# Patient Record
Sex: FEMALE | Race: WHITE | NOT HISPANIC OR LATINO | Employment: FULL TIME | URBAN - METROPOLITAN AREA
[De-identification: names, ages, dates, MRNs, and addresses within clinical notes are randomized per-mention and may not be internally consistent; named-entity substitution may affect disease eponyms.]

---

## 2017-02-06 ENCOUNTER — OFFICE VISIT (OUTPATIENT)
Dept: INTERNAL MEDICINE | Facility: CLINIC | Age: 47
End: 2017-02-06
Payer: COMMERCIAL

## 2017-02-06 ENCOUNTER — LAB VISIT (OUTPATIENT)
Dept: LAB | Facility: HOSPITAL | Age: 47
End: 2017-02-06
Attending: INTERNAL MEDICINE
Payer: COMMERCIAL

## 2017-02-06 ENCOUNTER — IMMUNIZATION (OUTPATIENT)
Dept: INTERNAL MEDICINE | Facility: CLINIC | Age: 47
End: 2017-02-06
Payer: COMMERCIAL

## 2017-02-06 VITALS — HEIGHT: 68 IN | SYSTOLIC BLOOD PRESSURE: 108 MMHG | HEART RATE: 70 BPM | DIASTOLIC BLOOD PRESSURE: 76 MMHG

## 2017-02-06 DIAGNOSIS — Z00.00 ENCOUNTER FOR PREVENTIVE HEALTH EXAMINATION: ICD-10-CM

## 2017-02-06 DIAGNOSIS — Z00.00 ENCOUNTER FOR PREVENTIVE HEALTH EXAMINATION: Primary | ICD-10-CM

## 2017-02-06 LAB
ALBUMIN SERPL BCP-MCNC: 3.9 G/DL
ALP SERPL-CCNC: 59 U/L
ALT SERPL W/O P-5'-P-CCNC: 35 U/L
ANION GAP SERPL CALC-SCNC: 5 MMOL/L
AST SERPL-CCNC: 29 U/L
BASOPHILS # BLD AUTO: 0.03 K/UL
BASOPHILS NFR BLD: 0.5 %
BILIRUB SERPL-MCNC: 0.3 MG/DL
BUN SERPL-MCNC: 12 MG/DL
CALCIUM SERPL-MCNC: 9 MG/DL
CHLORIDE SERPL-SCNC: 105 MMOL/L
CHOLEST/HDLC SERPL: 3.1 {RATIO}
CO2 SERPL-SCNC: 29 MMOL/L
CREAT SERPL-MCNC: 0.7 MG/DL
DIFFERENTIAL METHOD: NORMAL
EOSINOPHIL # BLD AUTO: 0.3 K/UL
EOSINOPHIL NFR BLD: 4.2 %
ERYTHROCYTE [DISTWIDTH] IN BLOOD BY AUTOMATED COUNT: 12.3 %
EST. GFR  (AFRICAN AMERICAN): >60 ML/MIN/1.73 M^2
EST. GFR  (NON AFRICAN AMERICAN): >60 ML/MIN/1.73 M^2
GLUCOSE SERPL-MCNC: 92 MG/DL
GLUCOSE SERPL-MCNC: 94 MG/DL (ref 70–110)
HCT VFR BLD AUTO: 38.4 %
HDL/CHOLESTEROL RATIO: 32.6 %
HDLC SERPL-MCNC: 138 MG/DL
HDLC SERPL-MCNC: 45 MG/DL
HGB BLD-MCNC: 12.9 G/DL
LDLC SERPL CALC-MCNC: 84 MG/DL
LYMPHOCYTES # BLD AUTO: 1.5 K/UL
LYMPHOCYTES NFR BLD: 23.9 %
MCH RBC QN AUTO: 30.9 PG
MCHC RBC AUTO-ENTMCNC: 33.6 %
MCV RBC AUTO: 92 FL
MONOCYTES # BLD AUTO: 0.4 K/UL
MONOCYTES NFR BLD: 6.6 %
NEUTROPHILS # BLD AUTO: 4 K/UL
NEUTROPHILS NFR BLD: 64.6 %
NONHDLC SERPL-MCNC: 93 MG/DL
PLATELET # BLD AUTO: 237 K/UL
PMV BLD AUTO: 9.9 FL
POTASSIUM SERPL-SCNC: 4.3 MMOL/L
PROT SERPL-MCNC: 7.1 G/DL
RBC # BLD AUTO: 4.18 M/UL
SODIUM SERPL-SCNC: 139 MMOL/L
TRIGL SERPL-MCNC: 45 MG/DL
TSH SERPL DL<=0.005 MIU/L-ACNC: 0.58 UIU/ML
WBC # BLD AUTO: 6.19 K/UL

## 2017-02-06 PROCEDURE — 80061 LIPID PANEL: CPT

## 2017-02-06 PROCEDURE — 85025 COMPLETE CBC W/AUTO DIFF WBC: CPT

## 2017-02-06 PROCEDURE — 84443 ASSAY THYROID STIM HORMONE: CPT

## 2017-02-06 PROCEDURE — 99396 PREV VISIT EST AGE 40-64: CPT | Mod: S$GLB,,, | Performed by: PHYSICIAN ASSISTANT

## 2017-02-06 PROCEDURE — 90688 IIV4 VACCINE SPLT 0.5 ML IM: CPT | Mod: S$GLB,,, | Performed by: INTERNAL MEDICINE

## 2017-02-06 PROCEDURE — 90471 IMMUNIZATION ADMIN: CPT | Mod: S$GLB,,, | Performed by: INTERNAL MEDICINE

## 2017-02-06 PROCEDURE — 80053 COMPREHEN METABOLIC PANEL: CPT

## 2017-02-06 PROCEDURE — 99999 PR PBB SHADOW E&M-EST. PATIENT-LVL III: CPT | Mod: PBBFAC,,, | Performed by: PHYSICIAN ASSISTANT

## 2017-02-06 PROCEDURE — 36415 COLL VENOUS BLD VENIPUNCTURE: CPT

## 2017-02-06 NOTE — PROGRESS NOTES
Subjective:       Patient ID: Heather Scheuermann Stark is a 46 y.o. female.        Chief Complaint: Annual Exam (labs today)    HPI Comments: Heather Scheuermann Stark is an established patient of Eloy Ortega MD here today for annual exam.    Need sooner physical than she was able to schedule with PCP due to form needed to present to her employer for biometric screening.      Feeling well with no complaints.      HEALTH MAINTENANCE  [facility]  Pap and mammogram per GYN, up-to-date  Flu shot today  Lab work today (of note not fasting but still wants to do labs today)         Review of Systems   Constitutional: Negative for chills, diaphoresis, fatigue and fever.   HENT: Negative for congestion and sore throat.    Eyes: Negative for visual disturbance.   Respiratory: Negative for cough, chest tightness and shortness of breath.    Cardiovascular: Negative for chest pain, palpitations and leg swelling.   Gastrointestinal: Negative for abdominal pain, blood in stool, constipation, diarrhea, nausea and vomiting.   Genitourinary: Negative for dysuria, frequency, hematuria and urgency.   Musculoskeletal: Negative for arthralgias and back pain.   Skin: Negative for rash.   Neurological: Negative for dizziness, syncope, weakness and headaches.   Psychiatric/Behavioral: Negative for dysphoric mood and sleep disturbance. The patient is not nervous/anxious.        Objective:      Physical Exam   Constitutional: She appears well-developed and well-nourished.   HENT:   Head: Normocephalic.   Right Ear: External ear normal.   Left Ear: External ear normal.   Mouth/Throat: Oropharynx is clear and moist.   Eyes: Pupils are equal, round, and reactive to light.   Cardiovascular: Normal rate, regular rhythm and normal heart sounds.  Exam reveals no gallop and no friction rub.    No murmur heard.  Pulmonary/Chest: Effort normal and breath sounds normal. No respiratory distress.   Abdominal: Soft. Normal appearance. There  "is no tenderness.   Musculoskeletal: She exhibits no edema.   Neurological: She is alert.   Skin: Skin is warm and dry.   Psychiatric: She has a normal mood and affect.   Nursing note and vitals reviewed.      Assessment:       1. Encounter for preventive health examination        Plan:       Sana was seen today for annual exam.    Diagnoses and all orders for this visit:    Encounter for preventive health examination  -     CBC auto differential; Future  -     Comprehensive metabolic panel; Future  -     Lipid panel; Future  -     TSH; Future  -     POCT glucose    Check lab work as above.  Form completed and returned to patient.    Pt has been given instructions populated from [employer] database and has verbalized understanding of the after visit summary and information contained wherein.    Follow up with a primary care provider. May go to ER for acute shortness of breath, lightheadedness, fever, or any other emergent complaints or changes in condition.    "This note will be shared with the patient"    No future appointments.            "

## 2017-02-06 NOTE — MR AVS SNAPSHOT
"    [facility]  [address]  [facility] [address]  Phone: [phone]  Fax: [phone]                  Heather Scheuermann Stark   2017 11:30 AM   Office Visit    Description:  Female : 1970   Provider:  Vicky Olmedo PA-C   Department:  [facility]           Reason for Visit     Annual Exam           Diagnoses this Visit        Comments    Encounter for preventive health examination    -  Primary            To Do List           Future Appointments        Provider Department Dept Phone    2017 12:00 PM LAB, APPOINTMENT [facility] [phone]      Goals (5 Years of Data)     None      [facility]     [facility] Care [facility] -  Assistance  Registered nurses in the [facility] provide clinical advisement, health education, appointment booking, and other advisory services.  Call for this free service at [phone].             Medications           Message regarding Medications     Verify the changes and/or additions to your medication regime listed below are the same as discussed with your clinician today.  If any of these changes or additions are incorrect, please notify your healthcare provider.             Verify that the below list of medications is an accurate representation of the medications you are currently taking.  If none reported, the list may be blank. If incorrect, please contact your healthcare provider. Carry this list with you in case of emergency.           Current Medications     escitalopram oxalate (LEXAPRO) 10 MG tablet TAKE 1 TABLET BY MOUTH DAILY    ibuprofen (ADVIL,MOTRIN) 100 MG tablet Take 100 mg by mouth every 6 (six) hours as needed for Temperature greater than.           Clinical Reference Information           Your Vitals Were     BP Pulse Height Last Period          108/76 (BP Location: Left arm, Patient Position: Sitting, BP Method: Manual) 70 5' 8" " (1.727 m) 01/05/2017        Blood Pressure          Most Recent Value    BP  108/76      Allergies as of 2/6/2017     Adhesive      Immunizations Administered on Date of Encounter - 2/6/2017     None      Orders Placed During Today's Visit      Normal Orders This Visit    POCT glucose     Future Labs/Procedures Expected by Expires    CBC auto differential  2/6/2017 2/6/2018    Comprehensive metabolic panel  2/6/2017 2/6/2018    Lipid panel  2/6/2017 4/7/2018    TSH  2/6/2017 4/7/2018 2/6/2017 11:33 AM - Regina Ortiz MA      Component Results     Component Value Flag Ref Range Units Status    POC Glucose 94  70 - 110 mg/dL Final            Instructions      Prevention Guidelines, Women Ages 40 to 49  Screening tests and vaccines are an important part of managing your health. Health counseling is essential, too. Below are guidelines for these, for women ages 40 to 49. Talk with your healthcare provider to make sure youre up-to-date on what you need.  Screening Who needs it How often   Type 2 diabetes or prediabetes All adults beginning at age 45 and adults without symptoms at any age who are overweight or obese and have 1 or more additional risk factors for diabetes At least every 3 years   Alcohol misuse All women in this age group At routine exams   Blood pressure All women in this age group Every 2 years if your blood pressure is less than 120/80 mm Hg; yearly if your systolic blood pressure is 120 to 139 mm Hg, or your diastolic blood pressure reading is 80 to 89 mm Hg   Breast cancer All women in this age group Yearly mammogram and clinical breast exam2  Each woman should make her own decision. If a woman decides to have mammograms before age 50, they should be done every 2 years.3   Cervical cancer All women in this age group, except women who have had a complete hysterectomy Pap test every 3 years or Pap test plus human papilloma virus (HPV) test every 5 years   Chlamydia Women at increased risk for  infection At routine exams if you're at risk or have symptoms   Depression All women in this age group At routine exams   Gonorrhea Sexually active women at increased risk for infection At routine exams   Hepatitis C Anyone at increased risk; 1 time for those born between 1945 and 1965 At routine exams   High cholesterol or triglycerides All women ages 45 and older who are at risk for coronary artery disease; younger women, talk with your healthcare provider At least every 5 years   HIV All women At routine exams   Obesity All women in this age group At routine exams   Syphilis Women at increased risk for infection - talk with your healthcare provider At routine exams   Tuberculosis Women at increased risk for infection - talk with your healthcare provider Ask your healthcare provider   Vision All women in this age group Complete exam at age 40 and eye exams every 2 to 4 years. If you have a chronic disease, ask your healthcare provider how often you should have your eyes examined.4   Vaccine Who needs it How often   Chickenpox (varicella) All women in this age group who have no record of this infection or vaccine 2 doses; the second dose should be given at least 4 weeks after the first dose   Hepatitis A Women at increased risk for infection - talk with your healthcare provider 2 doses given 6 months apart   Hepatitis B Women at increased risk for infection - talk with your healthcare provider 3 doses over 6 months; second dose should be given 1 month after the first dose; the third dose should be given at least 2 months after the second dose and at least 4 months after the first dose   Haemophilus influenzae Type B (HIB) Women at increased risk 1 to 3 doses   Influenza (flu) All women in this age group Once a year   Measles, mumps, rubella (MMR) All women in this age group who have no record of these infections or vaccines 1 or 2 doses   Meningococcal Women at increased risk for infection - talk with your  healthcare provider 1 or more doses   Pneumococcal conjugate vaccine (PCV13) and pneumococcal polysaccharide vaccine (PPSV23) Women at increased risk for infection - talk with your healthcare provider 1 or 2 doses   Tetanus/diphtheria/pertussis (Td/Tdap) booster All women in this age group A one-time dose of Tdap instead of a Td booster after age 18, then Td every 10 years   Counseling Who needs it How often   BRCA gene mutation testing for breast and ovarian cancer susceptibility Women with increased risk for having gene mutation When your risk is known   Breast cancer and chemoprevention Women at high risk for breast cancer When your risk is known   Diet and exercise Women who are overweight or obese When diagnosed, and then at routine exams   Domestic violence Women at the age in which they are able to have children At routine exams   Sexually transmitted infection prevention Women at increased risk for infection - talk with your healthcare provider At routine exams   Use of tobacco and the health effects it can cause All women in this age group Every exam   1[facility] Diabetes Association  2American Cancer Society  3U.S. Preventive Services Task Force  4[facility] Academy of Ophthalmology  Date Last Reviewed: 8/27/2015 © 2000-2016 [employer]. [address] [place] [address]. All rights reserved. This information is not intended as a substitute for professional medical care. Always follow your healthcare professional's instructions.             Language Assistance Services     ATTENTION: Language assistance services are available, free of charge. Please call [phone].      ATENCIÓN: Si habla español, tiene a bain disposición servicios gratuitos de asistencia lingüística. Llame al [phone].     CHÚ Ý: N?u b?n nói Ti?ng Vi?t, có các d?ch v? h? tr? ngôn ng? mi?n phí dành cho b?n. G?i s? [phone].         [facility] complies with applicable Federal civil rights  laws and does not discriminate on the basis of race, color, national origin, age, disability, or sex.

## 2017-02-06 NOTE — PATIENT INSTRUCTIONS
Prevention Guidelines, Women Ages 40 to 49  Screening tests and vaccines are an important part of managing your health. Health counseling is essential, too. Below are guidelines for these, for women ages 40 to 49. Talk with your healthcare provider to make sure youre up-to-date on what you need.  Screening Who needs it How often   Type 2 diabetes or prediabetes All adults beginning at age 45 and adults without symptoms at any age who are overweight or obese and have 1 or more additional risk factors for diabetes At least every 3 years   Alcohol misuse All women in this age group At routine exams   Blood pressure All women in this age group Every 2 years if your blood pressure is less than 120/80 mm Hg; yearly if your systolic blood pressure is 120 to 139 mm Hg, or your diastolic blood pressure reading is 80 to 89 mm Hg   Breast cancer All women in this age group Yearly mammogram and clinical breast exam2  Each woman should make her own decision. If a woman decides to have mammograms before age 50, they should be done every 2 years.3   Cervical cancer All women in this age group, except women who have had a complete hysterectomy Pap test every 3 years or Pap test plus human papilloma virus (HPV) test every 5 years   Chlamydia Women at increased risk for infection At routine exams if you're at risk or have symptoms   Depression All women in this age group At routine exams   Gonorrhea Sexually active women at increased risk for infection At routine exams   Hepatitis C Anyone at increased risk; 1 time for those born between 1945 and 1965 At routine exams   High cholesterol or triglycerides All women ages 45 and older who are at risk for coronary artery disease; younger women, talk with your healthcare provider At least every 5 years   HIV All women At routine exams   Obesity All women in this age group At routine exams   Syphilis Women at increased risk for infection - talk with your healthcare provider At routine  exams   Tuberculosis Women at increased risk for infection - talk with your healthcare provider Ask your healthcare provider   Vision All women in this age group Complete exam at age 40 and eye exams every 2 to 4 years. If you have a chronic disease, ask your healthcare provider how often you should have your eyes examined.4   Vaccine Who needs it How often   Chickenpox (varicella) All women in this age group who have no record of this infection or vaccine 2 doses; the second dose should be given at least 4 weeks after the first dose   Hepatitis A Women at increased risk for infection - talk with your healthcare provider 2 doses given 6 months apart   Hepatitis B Women at increased risk for infection - talk with your healthcare provider 3 doses over 6 months; second dose should be given 1 month after the first dose; the third dose should be given at least 2 months after the second dose and at least 4 months after the first dose   Haemophilus influenzae Type B (HIB) Women at increased risk 1 to 3 doses   Influenza (flu) All women in this age group Once a year   Measles, mumps, rubella (MMR) All women in this age group who have no record of these infections or vaccines 1 or 2 doses   Meningococcal Women at increased risk for infection - talk with your healthcare provider 1 or more doses   Pneumococcal conjugate vaccine (PCV13) and pneumococcal polysaccharide vaccine (PPSV23) Women at increased risk for infection - talk with your healthcare provider 1 or 2 doses   Tetanus/diphtheria/pertussis (Td/Tdap) booster All women in this age group A one-time dose of Tdap instead of a Td booster after age 18, then Td every 10 years   Counseling Who needs it How often   BRCA gene mutation testing for breast and ovarian cancer susceptibility Women with increased risk for having gene mutation When your risk is known   Breast cancer and chemoprevention Women at high risk for breast cancer When your risk is known   Diet and exercise  Women who are overweight or obese When diagnosed, and then at routine exams   Domestic violence Women at the age in which they are able to have children At routine exams   Sexually transmitted infection prevention Women at increased risk for infection - talk with your healthcare provider At routine exams   Use of tobacco and the health effects it can cause All women in this age group Every exam   1[facility] Diabetes Association  2American Cancer Society  3U.S. Preventive Services Task Force  4A[facility] Academy of Ophthalmology  Date Last Reviewed: 8/27/2015  © 7305-7242 [employer]. [address] [place] [address], [place] [id number]. All rights reserved. This information is not intended as a substitute for professional medical care. Always follow your healthcare professional's instructions.

## 2017-04-28 ENCOUNTER — OFFICE VISIT (OUTPATIENT)
Dept: OBSTETRICS AND GYNECOLOGY | Facility: CLINIC | Age: 47
End: 2017-04-28
Payer: COMMERCIAL

## 2017-04-28 VITALS — WEIGHT: 163.13 LBS | HEIGHT: 64 IN | BODY MASS INDEX: 27.85 KG/M2

## 2017-04-28 DIAGNOSIS — Z01.419 ENCOUNTER FOR GYNECOLOGICAL EXAMINATION WITHOUT ABNORMAL FINDING: Primary | ICD-10-CM

## 2017-04-28 DIAGNOSIS — Z12.31 VISIT FOR SCREENING MAMMOGRAM: ICD-10-CM

## 2017-04-28 PROCEDURE — 99999 PR PBB SHADOW E&M-EST. PATIENT-LVL II: CPT | Mod: PBBFAC,,, | Performed by: OBSTETRICS & GYNECOLOGY

## 2017-04-28 PROCEDURE — 99396 PREV VISIT EST AGE 40-64: CPT | Mod: S$GLB,,, | Performed by: OBSTETRICS & GYNECOLOGY

## 2017-04-28 RX ORDER — CLOBETASOL PROPIONATE 0.5 MG/G
CREAM TOPICAL 2 TIMES DAILY
Qty: 45 G | Refills: 12 | Status: SHIPPED | OUTPATIENT
Start: 2017-04-28 | End: 2019-02-01

## 2017-04-28 RX ORDER — FLUCONAZOLE 150 MG/1
150 TABLET ORAL ONCE
Qty: 1 TABLET | Refills: 66 | Status: SHIPPED | OUTPATIENT
Start: 2017-04-28 | End: 2017-04-28

## 2017-04-28 NOTE — PROGRESS NOTES
PT HERE FOR ANNUAL.  WEARS YOGA PANTS ALL THE TIME. VULVA SWEATS AND BECOMES IRRITATED. NO D/C.  WANTS A DIFLUCAN RX IN CASE...    ROS:  GENERAL: No fever, chills, fatigability or weight loss.  VULVAR: No pain, no lesions and no itching.  VAGINAL: No relaxation, no itching, no discharge, no abnormal bleeding and no lesions.  ABDOMEN: No abdominal pain. Denies nausea. Denies vomiting. No diarrhea. No constipation  BREAST: Denies pain. No lumps. No discharge.  URINARY: No incontinence, no nocturia, no frequency and no dysuria.  CARDIOVASCULAR: No chest pain. No shortness of breath. No leg cramps.  NEUROLOGICAL: No headaches. No vision changes.  The remainder of the review of systems was negative.    PE:  General Appearance: overweight And Well developed. Well nourished. In no acute distress.  Vulva: Lesions: No.  Urethral Meatus: Normal size. Normal location. No lesions. No prolapse.  Urethra: No masses. No tenderness. No prolapse. No scarring.  Bladder: No masses. No tenderness.  Vagina: Mucosa NI:yes discharge no, atrophy no, cystocele no or rectocele no.  Cervix: Lesion: no  Stenotic: no Cervical motion tenderness: no  Uterus: Uterus size: 6 weeks. Support good. Uterus size: Normal  Adnexa: Masses: No Tenderness: No CDS Nodularity: No  Abdomen: overweight No masses. No tenderness.  Breasts: No bilateral masses. No bilateral discharge. No bilateral tenderness. No bilateral fibrocystic changes.  Neck: No thyroid enlargement. No thyroid masses.  Skin: Rashes: No      PROCEDURES:    PLAN:     DIAGNOSIS:  1. Encounter for gynecological examination without abnormal finding    2. Visit for screening mammogram        MEDICATIONS & ORDERS:  Orders Placed This Encounter    Mammo Digital Screening Bilat With CAD    fluconazole (DIFLUCAN) 150 MG Tab    clobetasol (TEMOVATE) 0.05 % cream       Patient was counseled today on the new ACS guidelines for cervical cytology screening as well as the current recommendations for  breast cancer screening. She was counseled to follow up with her PCP for other routine health maintenance. Counseling session lasted approximately 10 minutes, and all her questions were answered.         FOLLOW-UP: With me in 12 month

## 2017-05-05 ENCOUNTER — HOSPITAL ENCOUNTER (OUTPATIENT)
Dept: RADIOLOGY | Facility: HOSPITAL | Age: 47
Discharge: HOME | End: 2017-05-05
Attending: OBSTETRICS & GYNECOLOGY
Payer: COMMERCIAL

## 2017-05-05 DIAGNOSIS — Z12.31 VISIT FOR SCREENING MAMMOGRAM: ICD-10-CM

## 2017-05-05 PROCEDURE — 77067 SCR MAMMO BI INCL CAD: CPT | Mod: 26,,, | Performed by: RADIOLOGY

## 2017-05-05 PROCEDURE — 77067 SCR MAMMO BI INCL CAD: CPT | Mod: TC

## 2017-05-05 PROCEDURE — 77063 BREAST TOMOSYNTHESIS BI: CPT | Mod: 26,,, | Performed by: RADIOLOGY

## 2017-11-03 ENCOUNTER — OFFICE VISIT (OUTPATIENT)
Dept: INTERNAL MEDICINE | Facility: CLINIC | Age: 47
End: 2017-11-03
Payer: COMMERCIAL

## 2017-11-03 VITALS
HEART RATE: 63 BPM | BODY MASS INDEX: 25.49 KG/M2 | HEIGHT: 68 IN | RESPIRATION RATE: 16 BRPM | WEIGHT: 168.19 LBS | DIASTOLIC BLOOD PRESSURE: 78 MMHG | TEMPERATURE: 98 F | SYSTOLIC BLOOD PRESSURE: 138 MMHG | OXYGEN SATURATION: 98 %

## 2017-11-03 DIAGNOSIS — J06.9 UPPER RESPIRATORY TRACT INFECTION, UNSPECIFIED TYPE: Primary | ICD-10-CM

## 2017-11-03 PROCEDURE — 99999 PR PBB SHADOW E&M-EST. PATIENT-LVL IV: CPT | Mod: PBBFAC,,, | Performed by: INTERNAL MEDICINE

## 2017-11-03 PROCEDURE — 99213 OFFICE O/P EST LOW 20 MIN: CPT | Mod: S$GLB,,, | Performed by: INTERNAL MEDICINE

## 2017-11-03 NOTE — PATIENT INSTRUCTIONS
Ask the pharmacist for pseudoephedrine for a decongestant  Try claritin or other allergy med  Ibuprofen for aches and pains

## 2017-11-03 NOTE — PROGRESS NOTES
Subjective:       Patient ID: Heather Scheuermann Stark is a 47 y.o. female.    Chief Complaint: URI    URI    This is a new problem. Episode onset: 3 days. The problem has been unchanged. There has been no fever. Associated symptoms include congestion, coughing and sneezing. Pertinent negatives include no abdominal pain, chest pain, diarrhea, dysuria, ear pain, headaches, nausea, neck pain, plugged ear sensation, rash, sinus pain, sore throat, vomiting or wheezing. She has tried nothing for the symptoms. The treatment provided no relief.     Review of Systems   Constitutional: Negative for activity change, chills, fatigue and fever.   HENT: Positive for congestion and sneezing. Negative for ear pain, nosebleeds, postnasal drip, sinus pain, sinus pressure and sore throat.    Eyes: Negative.  Negative for visual disturbance.   Respiratory: Positive for cough. Negative for chest tightness, shortness of breath and wheezing.    Cardiovascular: Negative for chest pain.   Gastrointestinal: Negative for abdominal pain, diarrhea, nausea and vomiting.   Genitourinary: Negative for difficulty urinating, dysuria, frequency and urgency.   Musculoskeletal: Negative for arthralgias, neck pain and neck stiffness.   Skin: Negative for rash.   Neurological: Negative for dizziness, weakness and headaches.   Psychiatric/Behavioral: Negative for sleep disturbance. The patient is not nervous/anxious.        Objective:      Physical Exam   Constitutional: She is oriented to person, place, and time. She appears well-developed and well-nourished.  Non-toxic appearance. No distress.   HENT:   Head: Normocephalic and atraumatic.   Right Ear: Tympanic membrane, external ear and ear canal normal.   Left Ear: Tympanic membrane, external ear and ear canal normal.   Eyes: EOM are normal. Pupils are equal, round, and reactive to light. No scleral icterus.   Neck: Normal range of motion. Neck supple. No thyromegaly present.   Cardiovascular: Normal  rate, regular rhythm and normal heart sounds.    Pulmonary/Chest: Effort normal and breath sounds normal.   Abdominal: Soft. Bowel sounds are normal. She exhibits no mass. There is no tenderness. There is no rebound.   Musculoskeletal: Normal range of motion.   Lymphadenopathy:     She has no cervical adenopathy.   Neurological: She is alert and oriented to person, place, and time. She has normal reflexes. She displays normal reflexes. No cranial nerve deficit. She exhibits normal muscle tone. Coordination normal.   Skin: Skin is warm and dry.   Psychiatric: She has a normal mood and affect. Her behavior is normal.       Assessment:       1. Upper respiratory tract infection, unspecified type        Plan:   Sana was seen today for uri.    Diagnoses and all orders for this visit:    Upper respiratory tract infection, unspecified type

## 2017-11-12 RX ORDER — ESCITALOPRAM OXALATE 10 MG/1
TABLET ORAL
Qty: 90 TABLET | Refills: 1 | Status: SHIPPED | OUTPATIENT
Start: 2017-11-12 | End: 2018-05-14 | Stop reason: SDUPTHER

## 2018-01-15 ENCOUNTER — TELEPHONE (OUTPATIENT)
Dept: SPORTS MEDICINE | Facility: CLINIC | Age: 48
End: 2018-01-15

## 2018-01-15 NOTE — TELEPHONE ENCOUNTER
Returning call to patient.  Spoke to Dr. French.  He is okay seeing her for her elbow.  Scheduled patient for appointment.

## 2018-01-15 NOTE — TELEPHONE ENCOUNTER
----- Message from Sis Luciano sent at 1/15/2018  9:59 AM CST -----  Contact: [email or web address] number  Pt called in advising that she is experiencing pain in her right arm. She advised that she is having pain when she extends her arm out and the pain starts from her wrist and radiates up, passed the elbow. I suggested she see someone in the hand clinic, but Pt insisted that she would like to see Dr. French, if possible. Please call and advise    Thank you

## 2018-01-16 ENCOUNTER — OFFICE VISIT (OUTPATIENT)
Dept: SPORTS MEDICINE | Facility: CLINIC | Age: 48
End: 2018-01-16
Payer: COMMERCIAL

## 2018-01-16 ENCOUNTER — HOSPITAL ENCOUNTER (OUTPATIENT)
Dept: RADIOLOGY | Facility: HOSPITAL | Age: 48
Discharge: HOME | End: 2018-01-16
Attending: ORTHOPAEDIC SURGERY
Payer: COMMERCIAL

## 2018-01-16 VITALS — WEIGHT: 168 LBS | BODY MASS INDEX: 25.46 KG/M2 | HEIGHT: 68 IN

## 2018-01-16 DIAGNOSIS — M25.521 RIGHT ELBOW PAIN: ICD-10-CM

## 2018-01-16 DIAGNOSIS — M25.521 RIGHT ELBOW PAIN: Primary | ICD-10-CM

## 2018-01-16 PROCEDURE — 73080 X-RAY EXAM OF ELBOW: CPT | Mod: 26,RT,, | Performed by: RADIOLOGY

## 2018-01-16 PROCEDURE — 99214 OFFICE O/P EST MOD 30 MIN: CPT | Mod: S$GLB,,, | Performed by: ORTHOPAEDIC SURGERY

## 2018-01-16 PROCEDURE — 73080 X-RAY EXAM OF ELBOW: CPT | Mod: TC,FY,PO,RT

## 2018-01-16 PROCEDURE — 99999 PR PBB SHADOW E&M-EST. PATIENT-LVL III: CPT | Mod: PBBFAC,,, | Performed by: ORTHOPAEDIC SURGERY

## 2018-01-16 NOTE — PROGRESS NOTES
CC Right elbow pain    HPI:   Heather Scheuermann [place] is a RHD pleasant 47 y.o. patient who reports to clinic with right lateral elbow pain.  Locates pain at volar forearm for ~2 wks. Denies ASHER. Denies n/t.    Today the patient rates pain at a 8/10 on visual analog scale.          Affecting ADLS    Review of Systems   Constitution: Negative. Negative for chills, fever and night sweats.   HENT: Negative for congestion and headaches.    Eyes: Negative for blurred vision, left vision loss and right vision loss.   Cardiovascular: Negative for chest pain and syncope.   Respiratory: Negative for cough and shortness of breath.    Endocrine: Negative for polydipsia, polyphagia and polyuria.   Hematologic/Lymphatic: Negative for bleeding problem. Does not bruise/bleed easily.   Skin: Negative for dry skin, itching and rash.   Musculoskeletal: Negative for falls and muscle weakness.   Gastrointestinal: Negative for abdominal pain and bowel incontinence.   Genitourinary: Negative for bladder incontinence and nocturia.   Neurological: Negative for disturbances in coordination, loss of balance and seizures.   Psychiatric/Behavioral: Negative for depression. The patient does not have insomnia.    Allergic/Immunologic: Negative for hives and persistent infections.     PAST MEDICAL HISTORY:   Past Medical History:   Diagnosis Date    Endometriosis      PAST SURGICAL HISTORY:   Past Surgical History:   Procedure Laterality Date    PELVIC LAPAROSCOPY      X 2---INFERTILITY AND ENDOMETRIOSIS    PLEURA BIOPSY  07/2015    X LAP  1988    OVARIAN CYST     FAMILY HISTORY:   Family History   Problem Relation Age of Onset    Heart disease Father 60     MI    Diabetes Maternal Uncle     Diabetes Maternal Grandmother     Cancer Paternal Grandmother      Lymphoma    Breast cancer Neg Hx     Colon cancer Neg Hx     Ovarian cancer Neg Hx      SOCIAL HISTORY:   Social History     Social History    Marital status: Single     Spouse  "name: N/A    Number of children: N/A    Years of education: N/A     Occupational History     [employer]     Social History Main Topics    Smoking status: Never Smoker    Smokeless tobacco: Not on file    Alcohol use Yes      Comment: social    Drug use: No    Sexual activity: Yes     Partners: Male     Birth control/ protection: None      Comment:      Other Topics Concern    Not on file     Social History Narrative    No narrative on file       MEDICATIONS:   Current Outpatient Prescriptions:     escitalopram oxalate (LEXAPRO) 10 MG tablet, TAKE 1 TABLET BY MOUTH DAILY, Disp: 90 tablet, Rfl: 1    ibuprofen (ADVIL,MOTRIN) 100 MG tablet, Take 100 mg by mouth every 6 (six) hours as needed for Temperature greater than., Disp: , Rfl:     clobetasol (TEMOVATE) 0.05 % cream, Apply topically 2 (two) times daily., Disp: 45 g, Rfl: 12  ALLERGIES:   Review of patient's allergies indicates:   Allergen Reactions    Adhesive      tape       VITAL SIGNS: Ht 5' 8" (1.727 m)   Wt 76.2 kg (168 lb)   BMI 25.54 kg/m²        PHYSICAL EXAM:  General: Patient appears alert and oriented x 3.  Mood is pleasant.  Observation of ears, eyes and nose reveal no gross abnormalities. Observation of ears, eyes and nose reveal no gross abnormalities.  HEENT: NCAT, sclera nonicteric.  Well nourished, in no acute distress and ambulates with a non-antalgic gait with no assistive devices.    Skin: Skin intact bilaterally. Sensation intact bilaterally. Compartments soft. No evidence of edema, infection, or induration.     Right ELBOW / WRIST EXTREMITY EXAM:    OBSERVATION / INSPECTION    Swelling  none    Deformity  none  Discoloration  none     Scars   none    Atrophy  none    TENDERNESS / CREPITUS (T / C):           T / C        Medial epicondyle   - / -    Med. (Ulnar) collateral ligament  - / -    Flexor pronator Musculature   - / -   Biceps tendon    - / -   Head of radius    - / -    Lateral " epicondyle   + / -    Extensor Musculature   - / -   Brachioradialis   - / -   Triceps tendon   - / -   Triceps muscle   - / -   Olecranon    - / -   Olecranon bursa   - / -   Cubital fossa    - / -   Anterior jointline   - / -   Radial tunnel    - / -             ROM: ('*' = with pain)    Right Elbow  AROM (PROM)     Extension   0 deg  (5 deg)   Flexion   145 deg (145 deg)         Pronation  90 deg  (90 deg)      Supination   80 deg  (80 deg)                 Left Elbow  AROM (PROM)     Extension   0 deg  (5 deg)   Flexion   145 deg (145 deg)         Pronation  90 deg  (90 deg)      Supination   80 deg  (80 deg)            Right Wrist  AROM (PROM)   Extension   80 deg (85 deg)   Flexion   80 deg (85 deg)         Ulnar Deviation   35 deg (40 deg)  Radial Deviation 35 deg (40 deg)             Left Wrist   AROM (PROM)     Extension   80 deg (85 deg)   Flexion   80 deg (85 deg)         Ulnar Deviation   35 deg (40 deg)  Radial Deviation 35 deg (40 deg)        STRENGTH: ('*' = with pain)    Elbow Flexion:   5/5  Elbow Extension:  5/5  Wrist Flexion:   5/5  Wrist Extension:  5/5  :    5/5  Intrinsics:   5/5  EPL (Ext. pollicis longus): 5/5  Pinch Mechanism:  5/5    ELBOW EXAMINATION:  See above noted areas of tenderness.   Test for Ligamentous Instability - UCL normal  Test for Ligamentous Instability - LUCL normal  PLRI       neg  Tinel's (Percussion) Test - Cubital  neg  Tennis Elbow Test    +  Golfer's Elbow Test    neg  Radial Capitellar Grind Test   neg  Valgus/Extension Overload Test  neg  Resisted Long Finger Extension Pain +  Moving Valgus     neg  Forearm pain with resisted supination neg    WRIST EXAMINATION:  See above noted areas of tenderness.   Finkelstein's Test   neg  Tinel's Test - Carpal Tunnel  neg  Phalen's Test    neg  Median Nerve Compression Test neg  Ulnar-sided Compression Test neg  LT Ballottment Test   neg  Snuff box tenderness   neg  Caldera's Test   neg  LT Instability    neg  Hook of  Hamate Tenderness  neg     EXTREMITY NEURO-VASCULAR EXAMINATION: Sensation grossly intact to light touch all dermatomal regions. DTR 2+ Biceps, Triceps, BR and Negative Rose's sign. Grossly intact motor function at Elbow, Wrist and Hand. Distal pulses radial and ulnar 2+, brisk cap refill, symmetric.    Other Findings:      Xrays: (AP, lateral, oblique) Right elbow ordered and reviewed by me personally today: no evidence of fracture or dislocation.  Osseous structures appear intact.        ASSESSMENT:  Right elbow pain, lateral epicondylitis      PLAN:  PT  Stretches demonstrated  NSAIDs PRN  RTC 6 wks  Tennis elbow strap  Luis twist bar

## 2018-02-21 ENCOUNTER — CLINICAL SUPPORT (OUTPATIENT)
Dept: REHABILITATION | Facility: HOSPITAL | Age: 48
End: 2018-02-21
Attending: ORTHOPAEDIC SURGERY
Payer: COMMERCIAL

## 2018-02-21 DIAGNOSIS — M25.521 RIGHT ELBOW PAIN: ICD-10-CM

## 2018-02-21 DIAGNOSIS — R29.898 UPPER EXTREMITY WEAKNESS: ICD-10-CM

## 2018-02-21 DIAGNOSIS — R29.898 DECREASED GRIP STRENGTH OF RIGHT HAND: ICD-10-CM

## 2018-02-21 PROCEDURE — 97161 PT EVAL LOW COMPLEX 20 MIN: CPT | Mod: PO

## 2018-02-21 NOTE — PLAN OF CARE
OUTPATIENT PHYSICAL THERAPY  PHYSICAL THERAPY EVALUATION    Name: Heather Scheuermann [place]  Clinic Number: [id number]    Evaluation Date: 02/21/2018  Visit #: 1 / 20  Authorization period Expiration: 12/31/2018  Plan of Care Expiration: 05/21/2018  Precautions: Standard     Diagnosis:   Encounter Diagnoses   Name Primary?    Upper extremity weakness     Decreased  strength of right hand     Right elbow pain      Physician: Trent Soares MD  Treatment Orders: PT Eval and Treat  Past Medical History:   Diagnosis Date    Endometriosis      Current Outpatient Prescriptions   Medication Sig    clobetasol (TEMOVATE) 0.05 % cream Apply topically 2 (two) times daily.    escitalopram oxalate (LEXAPRO) 10 MG tablet TAKE 1 TABLET BY MOUTH DAILY    ibuprofen (ADVIL,MOTRIN) 100 MG tablet Take 100 mg by mouth every 6 (six) hours as needed for Temperature greater than.     No current facility-administered medications for this visit.      Review of patient's allergies indicates:   Allergen Reactions    Adhesive      tape       History   Prior Therapy: No prior therapy   Social History: Lives alone, no stairs or   Previous functional status: Prior to incident, pt independent in all ADLs and physical activity   Current functional status: Difficulty lifting   Work: , mostly on computer     Subjective   History of Present Illness: Pt presents to [facility] with complaints of R elbow pain after insidious onset approximately 3 weeks ago. Pt reports no specific injury, but states she may have injured her arm while throwing a ball to her dog. Pt stated the pain is progressively getting worse and wakes her up in the middle of the night. Pt reports recent onset of night sweats and waking up throughout the night to change her clothes.  Pt with difficulty lifting objects and turning door knobs. Pt denied any prior shoulder or elbow injury to [facility]. Pt stated she was given an compression brace, but does not  "wear it because she feels like it is cutting off her circulation. Pt. denies history of minor or major trauma, motor vehicle accident, fall; past or present cancer; fever, chills, night sweats; unexplained weight change in past 3 months; recent infection; persistent night pain; saddle anesthesia, or changes in bowel/bladder function.      DOI: 3 weeks ago   Imaging, bone scan films: 01/16/2018 "Bones of the right elbow are normal.  No joint effusion is seen.  No significant abnormalities."  Pain: current 1/10, worst 10/10, best 1/10, Throbbing, Aching, and Sharp, constant  Radicular symptoms: None   Aggravating factors: Lifting, turning nobs  Easing factors: Stretching and rubbing arm   Pts goals: Reduce pain and return to PLOF    Objective   Mental status: alert, interactive, oriented x3  Posture/ Alignment: In standing, pt with forward head, rounded shoulders; Pt is RHD    Movement Analysis: Pt with guarded RUE keeping arm in adduction with elbow flexion.     GAIT DEVIATIONS: Sana amb with no obvious abnormalities. .    ROM:     Cervical and Shoulder AROM within normal limits and free of symptoms.    PROM Right Left Comment   Elbow Flexion: 120 (130) degrees 140 degrees    Elbow Extension:: 0-2 degrees 0 degrees    Wrist Flexion: 85 degrees WNL degrees    Wrist Extension: 80 degrees WNL degrees    *pain    Strength:    Right Left Comment   Shoulder flexion: 4+/5 4+/5    Shoulder Abduction: 4+/5 4+/5    Elbow Flexion: 5/5 5/5    Elbow Extension: 5/5 5/5    Wrist Extension:  4/5* 5/5    Wrist Flexion; 5/5 5/5     4+/5 5/5        Special Tests:  Mill's: (+) RUE  Cozen's: (+) RUE    Neurovascular:  - Sensation: Grossly intact to light touch across BUE  - Neural Tension    Palpation:  Pt TTP at lateral epicondyle and extensor insertion. Pt with increased muscle tone and warmth over ECRB and brachioradialis.     Joint Play:  Normal mobility proximal radioulnar joint in AP and PA planes   Normal mobility in " "humeroulnar joint       Pt/family was provided educational information, including: PT evaluation findings, healing time frame, role of PT, goals for PT, scheduling - pt verbalized understanding. Discussed insurance plan with pt.     TREATMENT   Time In: 12:05 PM  Time Out: 12: 50 PM    Discussed Plan of Care with patient: Yes    Sana received 5 minutes of therapeutic exercises including:   Putty squeezes x 20  Yellow putty   Wrist flexor stretch 5 x 10" hold   Wrist extensor stretch 5 x 10" hold   Ice massage to lateral epicondyle - not performed, added to HEP      Written Home Exercises Provided: yes   Exercises were reviewed and Sana was able to demonstrate them prior to the end of the session. Pt received a written copy of exercises to perform at home. Sana demonstrated good  understanding of the education provided.     Assessment   Sana is a 47 y.o. female referred to outpatient physical therapy with a medical diagnosis of R elbow pain. Pt demonstrates tenderness to extensor tendon insertion, increased muscle tone and guarding to wrist extensor musculature, decreased AROM of R elbow, and decreased  strength. Pt presents with signs and symptoms consistent with lateral epicondylitis. Sana is a good candidate for skilled therapy services at this time to restore AROM of R elbow, increase  strength, and resolve muscle imbalances, so she may return to PLOF. Pt prognosis is Good. Positive prognostic factors include motivation for therapy, previous compliance with therapy. Negative prognostic factors include severity of symptoms. Pt will benefit from skilled outpatient physical therapy to address the above stated deficits, provide pt/family education, and to maximize pt's level of independence.     History  Co-morbidities and personal factors that may impact the plan of care Examination  Body Structures and Functions, activity limitations and participation restrictions that may impact the plan of " care    Clinical Presentation   Co-morbidities:   none        Personal Factors:   no deficits Body Regions:   upper extremities    Body Systems:   ROM  strength        Participation Restrictions:   Pt unable to lift objections greater than 3# with RUE     Activity limitations:   Learning and applying knowledge  no deficits    General Tasks and Commands  no deficits    Communication  no deficits    Mobility  no deficits    Self care  no deficits    Domestic Life  no deficits    Interactions/Relationships  no deficits    Life Areas  no deficits    Community and Social Life  no deficits         stable and uncomplicated                      low   low  low Decision Making/ Complexity Score:  low     Pt's spiritual, cultural and educational needs considered and pt agreeable to plan of care and goals as stated below:     Anticipated Barriers for physical therapy: none     Short Term GOALS:  In 4 weeks, pt. will:  Report decreased R UE pain < / = 5 /10 at worse to increase tolerance for ADLs  Increase R elbow AROM >/= LUE to increase tolerance for ADLs and work related activity.   Pt will be able to lift 1 lbs in available range with no increase in R UE pain in order to improve tolerance for job duties.   Pt to tolerate HEP to improve ROM and independence with ADL's    Long Term GOALS:  In 6 weeks, pt. Will:  Pt will be able to lift 3 lbs in available range with no increase in R UE pain in order to improve tolerance for job duties.  Pt to report > / = 50% decrease in difficulty with lifting purse  Report decreased R UE pain < / = 3 /10 at worse to increase tolerance for ADLs  Be independent with HEP and SX management     Plan   Outpatient physical therapy 1 - 2 times weekly to include: pt ed, HEP, therapeutic exercises, therapeutic activities, neuromuscular re-education/ balance exercises, manual therapy, dry needling, and modalities prn. Cont PT for 4 -6 weeks. Pt may be seen by PTA as part of the rehabilitation team.      I certify the need for these services furnished under this plan of treatment and while under my care.    Jelly Geiger, PT

## 2018-02-21 NOTE — PATIENT INSTRUCTIONS
.  Ergonomics: Your Work Area    Is your workstation arranged so you can work efficiently? That means having your monitor, keyboard, mouse, and workstation tools--such as your telephone and document medrano--well placed. When they are, you'll feel better and most likely get more done.  Monitor  Screen height  · Sit with your lower back supported and feet firmly on the floor or footrest. Hold your head upright and look straight at the screen. The top of your screen should be at or slightly below eye level.  · If it isn't, ask someone to help you raise or lower your monitor. Place it at a viewing height that allows you to keep your head upright. (If you can't adjust your screen height, place a stand or board beneath your monitor.)  Screen distance  · Measure the distance from your eyes to the screen. For most people, the screen should be 18 to 30 inches from your eyes, or at about arm's length.  · If it isn't, get help moving your monitor to the desired distance.  Keyboard  · Place your fingers on the keyboard's middle row of letters. Your wrists should be straight and relaxed.  · If they aren't, adjust your keyboard height up or down until your wrists are straight.  · If the keyboard is too low, put a pad of paper under it.  · If your wrists are still not straight, readjust your chair height. Make sure your feet remain on the floor or footrest.  · Wrist rests may be used to support your hands when you are not actively keying (typing).  Workstation tools  · Arrange your tools so the things you use most are within easy reach. The things you don't use often can be farther away.  · Place your document medrano at the same height and distance as your screen.  · If you use the telephone a lot, think about using a headset.  Date Last Reviewed: 1/1/2017  © 7085-1784 [employer]. [address] [place] [address], [place] [address]. All rights reserved. This information is not intended as a substitute for professional medical  care. Always follow your healthcare professional's instructions.

## 2018-04-27 DIAGNOSIS — Z12.31 ENCOUNTER FOR SCREENING MAMMOGRAM FOR BREAST CANCER: Primary | ICD-10-CM

## 2018-05-15 ENCOUNTER — OFFICE VISIT (OUTPATIENT)
Dept: OBSTETRICS AND GYNECOLOGY | Facility: CLINIC | Age: 48
End: 2018-05-15
Payer: COMMERCIAL

## 2018-05-15 ENCOUNTER — HOSPITAL ENCOUNTER (OUTPATIENT)
Dept: RADIOLOGY | Facility: HOSPITAL | Age: 48
Discharge: HOME | End: 2018-05-15
Attending: OBSTETRICS & GYNECOLOGY
Payer: COMMERCIAL

## 2018-05-15 VITALS
SYSTOLIC BLOOD PRESSURE: 118 MMHG | WEIGHT: 171.94 LBS | HEIGHT: 65 IN | DIASTOLIC BLOOD PRESSURE: 72 MMHG | BODY MASS INDEX: 28.65 KG/M2

## 2018-05-15 DIAGNOSIS — Z01.419 ENCOUNTER FOR GYNECOLOGICAL EXAMINATION WITHOUT ABNORMAL FINDING: Primary | ICD-10-CM

## 2018-05-15 DIAGNOSIS — N90.89 VULVAR LESION: ICD-10-CM

## 2018-05-15 DIAGNOSIS — Z12.31 ENCOUNTER FOR SCREENING MAMMOGRAM FOR BREAST CANCER: ICD-10-CM

## 2018-05-15 PROCEDURE — 99396 PREV VISIT EST AGE 40-64: CPT | Mod: S$GLB,,, | Performed by: OBSTETRICS & GYNECOLOGY

## 2018-05-15 PROCEDURE — 99999 PR PBB SHADOW E&M-EST. PATIENT-LVL III: CPT | Mod: PBBFAC,,, | Performed by: OBSTETRICS & GYNECOLOGY

## 2018-05-15 PROCEDURE — 77063 BREAST TOMOSYNTHESIS BI: CPT | Mod: 26,,, | Performed by: RADIOLOGY

## 2018-05-15 PROCEDURE — 88175 CYTOPATH C/V AUTO FLUID REDO: CPT

## 2018-05-15 PROCEDURE — 77067 SCR MAMMO BI INCL CAD: CPT | Mod: 26,,, | Performed by: RADIOLOGY

## 2018-05-15 PROCEDURE — 77067 SCR MAMMO BI INCL CAD: CPT | Mod: TC

## 2018-05-15 RX ORDER — ESCITALOPRAM OXALATE 10 MG/1
TABLET ORAL
Qty: 90 TABLET | Refills: 1 | Status: SHIPPED | OUTPATIENT
Start: 2018-05-15 | End: 2018-11-04 | Stop reason: SDUPTHER

## 2018-05-15 NOTE — PROGRESS NOTES
PT HERE FOR ANNUAL.    ROS:  GENERAL: No fever, chills, fatigability or weight loss.  VULVAR: No pain, no lesions and no itching.  VAGINAL: No relaxation, no itching, no discharge, no abnormal bleeding and no lesions.  ABDOMEN: No abdominal pain. Denies nausea. Denies vomiting. No diarrhea. No constipation  BREAST: Denies pain. No lumps. No discharge.  URINARY: No incontinence, no nocturia, no frequency and no dysuria.  CARDIOVASCULAR: No chest pain. No shortness of breath. No leg cramps.  NEUROLOGICAL: No headaches. No vision changes.  The remainder of the review of systems was negative.    PE:  General Appearance: normal weight And Well developed. Well nourished. In no acute distress.  Vulva: Lesions: YES  MOLE ON R UPPER AND SKIN TAG R LOWER  Urethral Meatus: Normal size. Normal location. No lesions. No prolapse.  Urethra: No masses. No tenderness. No prolapse. No scarring.  Bladder: No masses. No tenderness.  Vagina: Mucosa NI:yes discharge no, atrophy no, cystocele no or rectocele no.  Cervix: Lesion: no  Stenotic: no Cervical motion tenderness: no  Uterus: Uterus size: 6 weeks. Support good. Uterus size: Normal  Adnexa: Masses: No Tenderness: No CDS Nodularity: No  Abdomen: overweight No masses. No tenderness.  Breasts: No bilateral masses. No bilateral discharge. No bilateral tenderness. No bilateral fibrocystic changes.  Neck: No thyroid enlargement. No thyroid masses.  Skin: Rashes: No          PROCEDURES:    PLAN:     DIAGNOSIS:  1. Encounter for gynecological examination without abnormal finding    2. Vulvar lesion        MEDICATIONS & ORDERS:  Orders Placed This Encounter    Liquid-based pap smear, screening       Patient was counseled today on the new ACS guidelines for cervical cytology screening as well as the current recommendations for breast cancer screening. She was counseled to follow up with her PCP for other routine health maintenance. Counseling session lasted approximately 10 minutes, and  all her questions were answered.         FOLLOW-UP: With me in [facility]

## 2018-05-21 ENCOUNTER — TELEPHONE (OUTPATIENT)
Dept: RADIOLOGY | Facility: HOSPITAL | Age: 48
End: 2018-05-21

## 2018-05-21 NOTE — TELEPHONE ENCOUNTER
Called patient in reference to her mammogram and scheduling a abnormal mammogram follow up. No answer. Left the patient a message with my direct phone number.

## 2018-05-22 ENCOUNTER — TELEPHONE (OUTPATIENT)
Dept: RADIOLOGY | Facility: HOSPITAL | Age: 48
End: 2018-05-22

## 2018-05-22 ENCOUNTER — HOSPITAL ENCOUNTER (OUTPATIENT)
Dept: RADIOLOGY | Facility: HOSPITAL | Age: 48
Discharge: HOME | End: 2018-05-22
Attending: OBSTETRICS & GYNECOLOGY
Payer: COMMERCIAL

## 2018-05-22 DIAGNOSIS — R92.8 ABNORMAL MAMMOGRAM: ICD-10-CM

## 2018-05-22 PROCEDURE — 76642 ULTRASOUND BREAST LIMITED: CPT | Mod: 26,RT,, | Performed by: RADIOLOGY

## 2018-05-22 PROCEDURE — 77061 BREAST TOMOSYNTHESIS UNI: CPT | Mod: TC,PO

## 2018-05-22 PROCEDURE — 77061 BREAST TOMOSYNTHESIS UNI: CPT | Mod: 26,,, | Performed by: RADIOLOGY

## 2018-05-22 PROCEDURE — 77065 DX MAMMO INCL CAD UNI: CPT | Mod: TC,PO

## 2018-05-22 PROCEDURE — 77065 DX MAMMO INCL CAD UNI: CPT | Mod: 26,,, | Performed by: RADIOLOGY

## 2018-05-22 PROCEDURE — 76642 ULTRASOUND BREAST LIMITED: CPT | Mod: TC,PO,RT

## 2018-05-22 NOTE — TELEPHONE ENCOUNTER
Spoke with patient and explained mammogram findings.Patient expressed understanding of results. Patient is scheduled for a abnormal mammogram follow up appointment at [facility] [[email or web address]] on 5/22/18

## 2018-06-15 ENCOUNTER — DOCUMENTATION ONLY (OUTPATIENT)
Dept: REHABILITATION | Facility: HOSPITAL | Age: 48
End: 2018-06-15

## 2018-06-15 NOTE — PROGRESS NOTES
PHYSICAL THERAPY DISCHARGE SUMMARY     Name: Heather Scheuermann [place]  Clinic Number: [id number]    Diagnosis:        Encounter Diagnoses   Name Primary?    Upper extremity weakness      Decreased  strength of right hand      Right elbow pain        Physician: Trent Soares MD  Treatment Orders: PT Eval and Treat    Past Medical History:   Diagnosis Date    Endometriosis        Initial visit: 2018   Date of Last visit: 2018  Date of Discharge Note:  06/15/2018  Total Visits Received: 1  Missed Visits: 0  ASSESSMENT   Status Towards Goals Met:  Pt did not return to clinic for re-assessment. Pt did not attend therapy for appropriate frequency to achieve goals.     Goals Not achieved and why:  Pt has not scheduled any additional visits and has not returned to therapy.     Discharge reason : Patient self discharge, Pt has not re-scheduled further follow-up sessions and POC has     Short Term GOALS:  In 4 weeks, pt. will:  Report decreased R UE pain < / = 5 /10 at worse to increase tolerance for ADLs  Increase R elbow AROM >/= LUE to increase tolerance for ADLs and work related activity.   Pt will be able to lift 1 lbs in available range with no increase in R UE pain in order to improve tolerance for job duties.   Pt to tolerate HEP to improve ROM and independence with ADL's     Long Term GOALS:  In 6 weeks, pt. Will:  Pt will be able to lift 3 lbs in available range with no increase in R UE pain in order to improve tolerance for job duties.  Pt to report > / = 50% decrease in difficulty with lifting purse  Report decreased R UE pain < / = 3 /10 at worse to increase tolerance for ADLs  Be independent with HEP and SX management     PLAN   This patient is discharged from Physical Therapy Services.       Jelly Geiger, PT

## 2018-11-04 ENCOUNTER — TELEPHONE (OUTPATIENT)
Dept: INTERNAL MEDICINE | Facility: CLINIC | Age: 48
End: 2018-11-04

## 2018-11-04 DIAGNOSIS — Z00.00 ANNUAL PHYSICAL EXAM: Primary | ICD-10-CM

## 2018-11-05 RX ORDER — ESCITALOPRAM OXALATE 10 MG/1
TABLET ORAL
Qty: 90 TABLET | Refills: 1 | Status: SHIPPED | OUTPATIENT
Start: 2018-11-05 | End: 2019-02-01 | Stop reason: SDUPTHER

## 2018-11-05 NOTE — TELEPHONE ENCOUNTER
Pt hasn't been seen over 1 year for visit with Internal Medicine. Please schedule for visit sometime in next few weeks with either me or Aurea Pace; fasting labs 1 week prior.

## 2018-12-03 ENCOUNTER — OFFICE VISIT (OUTPATIENT)
Dept: INTERNAL MEDICINE | Facility: CLINIC | Age: 48
End: 2018-12-03
Payer: COMMERCIAL

## 2018-12-03 VITALS
TEMPERATURE: 99 F | WEIGHT: 175.94 LBS | SYSTOLIC BLOOD PRESSURE: 116 MMHG | OXYGEN SATURATION: 98 % | HEART RATE: 93 BPM | HEIGHT: 69 IN | DIASTOLIC BLOOD PRESSURE: 78 MMHG | BODY MASS INDEX: 26.06 KG/M2

## 2018-12-03 DIAGNOSIS — J06.9 VIRAL URI WITH COUGH: Primary | ICD-10-CM

## 2018-12-03 LAB
DEPRECATED S PYO AG THROAT QL EIA: NEGATIVE
FLUAV AG SPEC QL IA: NEGATIVE
FLUBV AG SPEC QL IA: NEGATIVE
SPECIMEN SOURCE: NORMAL

## 2018-12-03 PROCEDURE — 99999 PR PBB SHADOW E&M-EST. PATIENT-LVL IV: CPT | Mod: PBBFAC,,, | Performed by: PHYSICIAN ASSISTANT

## 2018-12-03 PROCEDURE — 99213 OFFICE O/P EST LOW 20 MIN: CPT | Mod: S$GLB,,, | Performed by: PHYSICIAN ASSISTANT

## 2018-12-03 PROCEDURE — 87880 STREP A ASSAY W/OPTIC: CPT

## 2018-12-03 PROCEDURE — 3008F BODY MASS INDEX DOCD: CPT | Mod: CPTII,S$GLB,, | Performed by: PHYSICIAN ASSISTANT

## 2018-12-03 PROCEDURE — 87081 CULTURE SCREEN ONLY: CPT

## 2018-12-03 PROCEDURE — 87400 INFLUENZA A/B EACH AG IA: CPT

## 2018-12-03 RX ORDER — BENZONATATE 100 MG/1
100 CAPSULE ORAL 3 TIMES DAILY PRN
Qty: 20 CAPSULE | Refills: 0 | Status: SHIPPED | OUTPATIENT
Start: 2018-12-03 | End: 2018-12-13

## 2018-12-03 NOTE — PATIENT INSTRUCTIONS
"An UPPER RESPIRATORY ILLNESS is initially caused by a virus or allergies 95% of the time. The goal to help you feel better is drying up the drainage & stopping the cough so the virus can run it's course in about 10 days. If your drainage becomes more thick and worse after 7-10 days of trying the below over the counter medications, please make an appointment for further evaluation.     If you have post nasal drip , "runny nose" or sore throat from clearing your throat, take an ANTIHISTAMINE (Claritin or Zyrtec or Allegra ) once or twice a day.     Nasal Saline: Tilt head back and squirt into nostril 2-3 times until you taste saline in back of throat. Spit, and blow nose. Do this 4 times, alternating right and left nostril. Do this routinely 2-3 times per day.     If you still have drainage or ear popping/ pressure/ decreased hearing, and you do NOT have high blood pressure, you can add a DECONGESTANT like Sudafed (if it doesn't cause palpitations) or Sudafed PE In the MORNING.     If you have thick mucus drainage from the nose or coughing up thick phlegm, you can add a MUCOLYTIC like Mucinex (plain)     If your cough persists, you can add a COUGH SUPPRESSANT like Delsym (dextromethorphan) twice a day.     If you have pain, sore throat, fever or chills you can alternate 500mg of Tylenol with 400mg of ibuprofen every 4-6 hours for the next 3 days.  Discontinue and call me if you have stomach upset.     For SORE THROAT, drink lots of WATER until your urine is clear because all of the above medications can "dry you out" and cause constipation. Come & see me if you are not better in 7-10 days or if your symptoms worsen.         "

## 2018-12-03 NOTE — PROGRESS NOTES
"Subjective:       Patient ID: Heather Scheuermann Stark is a 48 y.o. female.    Chief Complaint: Influenza    Established pt of MD IBAN Antoine    This is a new problem. The current episode started in the past 7 days. The problem has been unchanged. There has been no fever. Associated symptoms include congestion, coughing, rhinorrhea and a sore throat. Pertinent negatives include no abdominal pain, chest pain, headaches, nausea, rash, vomiting or wheezing. Associated symptoms comments: Body aches. She has tried acetaminophen and sleep (and Robutussin, throat sprays) for the symptoms.      Past Medical History:   Diagnosis Date    Endometriosis      Social History     Tobacco Use    Smoking status: Never Smoker   Substance Use Topics    Alcohol use: Yes     Comment: social    Drug use: No           Review of Systems   Constitutional: Negative for chills, fever and unexpected weight change.   HENT: Positive for congestion, rhinorrhea and sore throat.    Eyes: Negative for visual disturbance.   Respiratory: Positive for cough. Negative for shortness of breath and wheezing.    Cardiovascular: Negative for chest pain and leg swelling.   Gastrointestinal: Negative for abdominal pain, nausea and vomiting.   Endocrine: Negative for polydipsia, polyphagia and polyuria.   Skin: Negative for rash.   Neurological: Negative for weakness, light-headedness and headaches.           Objective: /78   Pulse 93   Temp 98.5 °F (36.9 °C)   Ht 5' 9" (1.753 m)   Wt 79.8 kg (175 lb 14.8 oz)   SpO2 98%   BMI 25.98 kg/m²         Physical Exam   Constitutional: She is oriented to person, place, and time. She appears well-developed and well-nourished. No distress.   HENT:   Head: Normocephalic and atraumatic.   Left Ear: Tympanic membrane, external ear and ear canal normal.   Nose: Rhinorrhea present.   Mouth/Throat: Uvula is midline, oropharynx is clear and moist and mucous membranes are normal. No oropharyngeal " "exudate, posterior oropharyngeal edema or posterior oropharyngeal erythema.   Cerumen impaction right ear   Eyes: Pupils are equal, round, and reactive to light.   Cardiovascular: Normal rate and regular rhythm. Exam reveals no friction rub.   No murmur heard.  Pulmonary/Chest: Effort normal and breath sounds normal. She has no wheezes. She has no rales.   Abdominal: Soft. Bowel sounds are normal. There is no tenderness.   Musculoskeletal: Normal range of motion.   Neurological: She is alert and oriented to person, place, and time.   Skin: Skin is warm and dry. Capillary refill takes less than 2 seconds. No rash noted. She is not diaphoretic.   Psychiatric: She has a normal mood and affect.   Vitals reviewed.      Assessment:       1. Viral URI with cough        Plan:         Sana was seen today for influenza.    Diagnoses and all orders for this visit:    Viral URI with cough  -     benzonatate (TESSALON) 100 MG capsule; Take 1 capsule (100 mg total) by mouth 3 (three) times daily as needed for Cough.  -     THROAT SCREEN, RAPID  -     Influenza antigen Nasopharyngeal Swab      Conservative measures for symptomatic relief discussed  RTC if symptoms worsen or fail to improve.   OTC Debrox for cerumen    Future Appointments   Date Time Provider Department Center   12/7/2018  8:30 AM LAB, SAME DAY [facility] LAB IM [facility]   12/10/2018  8:30 AM Aurea Pace DNP [facility]     Patient Instructions   An UPPER RESPIRATORY ILLNESS is initially caused by a virus or allergies 95% of the time. The goal to help you feel better is drying up the drainage & stopping the cough so the virus can run it's course in about 10 days. If your drainage becomes more thick and worse after 7-10 days of trying the below over the counter medications, please make an appointment for further evaluation.     If you have post nasal drip , "runny nose" or sore throat from clearing your throat, take an ANTIHISTAMINE " "(Claritin or Zyrtec or Allegra ) once or twice a day.     Nasal Saline: Tilt head back and squirt into nostril 2-3 times until you taste saline in back of throat. Spit, and blow nose. Do this 4 times, alternating right and left nostril. Do this routinely 2-3 times per day.     If you still have drainage or ear popping/ pressure/ decreased hearing, and you do NOT have high blood pressure, you can add a DECONGESTANT like Sudafed (if it doesn't cause palpitations) or Sudafed PE In the MORNING.     If you have thick mucus drainage from the nose or coughing up thick phlegm, you can add a MUCOLYTIC like Mucinex (plain)     If your cough persists, you can add a COUGH SUPPRESSANT like Delsym (dextromethorphan) twice a day.     If you have pain, sore throat, fever or chills you can alternate 500mg of Tylenol with 400mg of ibuprofen every 4-6 hours for the next 3 days.  Discontinue and call me if you have stomach upset.     For SORE THROAT, drink lots of WATER until your urine is clear because all of the above medications can "dry you out" and cause constipation. Come & see me if you are not better in 7-10 days or if your symptoms worsen.           Sherri Mir PA-C  "

## 2018-12-04 ENCOUNTER — OFFICE VISIT (OUTPATIENT)
Dept: INTERNAL MEDICINE | Facility: CLINIC | Age: 48
End: 2018-12-04
Payer: COMMERCIAL

## 2018-12-04 VITALS
SYSTOLIC BLOOD PRESSURE: 104 MMHG | DIASTOLIC BLOOD PRESSURE: 66 MMHG | HEIGHT: 69 IN | HEART RATE: 77 BPM | WEIGHT: 176.13 LBS | BODY MASS INDEX: 26.09 KG/M2 | TEMPERATURE: 99 F

## 2018-12-04 DIAGNOSIS — J06.9 VIRAL URI WITH COUGH: Primary | ICD-10-CM

## 2018-12-04 PROCEDURE — 99214 OFFICE O/P EST MOD 30 MIN: CPT | Mod: S$GLB,,, | Performed by: INTERNAL MEDICINE

## 2018-12-04 PROCEDURE — 99999 PR PBB SHADOW E&M-EST. PATIENT-LVL III: CPT | Mod: PBBFAC,,, | Performed by: INTERNAL MEDICINE

## 2018-12-04 PROCEDURE — 3008F BODY MASS INDEX DOCD: CPT | Mod: CPTII,S$GLB,, | Performed by: INTERNAL MEDICINE

## 2018-12-04 NOTE — PROGRESS NOTES
Subjective:       Patient ID: Heather Scheuermann Stark is a 48 y.o. female.    Chief Complaint: Generalized Body Aches    HPI    Patient seen yesterday for viral URI with cough.  Strep test and flu swab were negative.    Patient continues to feel hot and flushed in her face, though she has not had a recorded fever.  Reports significant swelling of the glands anterior laterally along the neck.  She flew back from a trip last Wednesday and symptoms started on Thursday.  She initially had severe sore throat, but this has improved.  She has been using over-the-counter Sudafed, Tylenol, Robitussin, and Claritin.  She started Tessalon 100 mg yesterday and also tried Mucinex but these have not provided relief of her cough.  She feels Robitussin DM helps the nose.  Very fatigued, slept all day on Sunday.  She has been working this week.  She has had cough that is not very productive.  Denies any major sinus congestion but does have some sinus drip.  More sinus pressure currently without excess drainage.    Review of Systems   All other systems reviewed and are negative.      Objective:      Physical Exam   Constitutional: No distress (no active distress but appears malaised).   HENT:   Right Ear: Tympanic membrane normal. No tenderness.   Left Ear: Tympanic membrane normal. No tenderness.   Mouth/Throat: Oropharynx is clear and moist. No oropharyngeal exudate.   Neck: Normal range of motion. No thyromegaly present.   Cardiovascular: Normal rate, regular rhythm and normal heart sounds.   Pulmonary/Chest: Effort normal and breath sounds normal. No stridor. She has no wheezes. She has no rales.   occasionally with nonproductive coughing during exam   Lymphadenopathy:     She has no cervical adenopathy.   Skin: She is not diaphoretic.   Nursing note and vitals reviewed.      Vitals:    12/04/18 1121   BP: 104/66   BP Location: Left arm   Patient Position: Sitting   BP Method: Large (Automatic)   Pulse: 77   Temp: 98.5 °F (36.9  "°C)   Weight: 79.9 kg (176 lb 2.4 oz)   Height: 5' 9" (1.753 m)     Body mass index is 26.01 kg/m².    RESULTS: Reviewed labs from last 3 months    Assessment:       1. Viral URI with cough        Plan:   Sana was seen today for generalized body aches.    Diagnoses and all orders for this visit:    Viral URI with cough:  Prior dx, persistent. No signs of bacterial infection. Strep and flu swabs negative. Should start to resolve over next few days with rest and continued use of over-the-counter meds. Provided work note.    No Follow-up on file. reschedule upcoming appointment for labs and visit so the patient can spend more time recovering.  Eloy Ortega MD  Internal Medicine    Portions of this note were completed using medical dictation software. Please excuse typographical or syntax errors that were missed on review.    "

## 2018-12-04 NOTE — LETTER
December 4, 2018    Heather Scheuermann Stark  [address] [place] [address]             [facility]  [address]  [place] [address]  Phone: [phone]  Fax: [phone] To Whom It May Concern:     Ms Tompkins was seen in my clinic today. I am recommending she remain off work duty for the rest of the day to recover from her illness. She may need additional days off work this week for rest and recovery as well depending on her symptoms.      If you have any questions or concerns, please don't hesitate to call.    Sincerely,        Eloy Ortega MD

## 2018-12-05 LAB — BACTERIA THROAT CULT: NORMAL

## 2019-01-25 ENCOUNTER — LAB VISIT (OUTPATIENT)
Dept: LAB | Facility: HOSPITAL | Age: 49
End: 2019-01-25
Attending: INTERNAL MEDICINE
Payer: COMMERCIAL

## 2019-01-25 DIAGNOSIS — Z00.00 ANNUAL PHYSICAL EXAM: ICD-10-CM

## 2019-01-25 LAB
ALBUMIN SERPL BCP-MCNC: 4.3 G/DL
ALP SERPL-CCNC: 70 U/L
ALT SERPL W/O P-5'-P-CCNC: 17 U/L
ANION GAP SERPL CALC-SCNC: 7 MMOL/L
AST SERPL-CCNC: 16 U/L
BILIRUB SERPL-MCNC: 0.5 MG/DL
BUN SERPL-MCNC: 12 MG/DL
CALCIUM SERPL-MCNC: 9.9 MG/DL
CHLORIDE SERPL-SCNC: 101 MMOL/L
CO2 SERPL-SCNC: 27 MMOL/L
CREAT SERPL-MCNC: 0.8 MG/DL
ERYTHROCYTE [DISTWIDTH] IN BLOOD BY AUTOMATED COUNT: 12.4 %
EST. GFR  (AFRICAN AMERICAN): >60 ML/MIN/1.73 M^2
EST. GFR  (NON AFRICAN AMERICAN): >60 ML/MIN/1.73 M^2
GLUCOSE SERPL-MCNC: 97 MG/DL
HCT VFR BLD AUTO: 42 %
HGB BLD-MCNC: 13.8 G/DL
MCH RBC QN AUTO: 30.3 PG
MCHC RBC AUTO-ENTMCNC: 32.9 G/DL
MCV RBC AUTO: 92 FL
PLATELET # BLD AUTO: 285 K/UL
PMV BLD AUTO: 9.3 FL
POTASSIUM SERPL-SCNC: 4.3 MMOL/L
PROT SERPL-MCNC: 7.7 G/DL
RBC # BLD AUTO: 4.56 M/UL
SODIUM SERPL-SCNC: 135 MMOL/L
WBC # BLD AUTO: 6.71 K/UL

## 2019-01-25 PROCEDURE — 36415 COLL VENOUS BLD VENIPUNCTURE: CPT

## 2019-01-25 PROCEDURE — 85027 COMPLETE CBC AUTOMATED: CPT

## 2019-01-25 PROCEDURE — 80053 COMPREHEN METABOLIC PANEL: CPT

## 2019-02-01 ENCOUNTER — OFFICE VISIT (OUTPATIENT)
Dept: INTERNAL MEDICINE | Facility: CLINIC | Age: 49
End: 2019-02-01
Payer: COMMERCIAL

## 2019-02-01 ENCOUNTER — IMMUNIZATION (OUTPATIENT)
Dept: INTERNAL MEDICINE | Facility: CLINIC | Age: 49
End: 2019-02-01
Payer: COMMERCIAL

## 2019-02-01 VITALS
SYSTOLIC BLOOD PRESSURE: 110 MMHG | BODY MASS INDEX: 26.01 KG/M2 | HEART RATE: 75 BPM | DIASTOLIC BLOOD PRESSURE: 72 MMHG | HEIGHT: 69 IN

## 2019-02-01 DIAGNOSIS — R63.5 WEIGHT GAIN: ICD-10-CM

## 2019-02-01 DIAGNOSIS — M25.512 CHRONIC LEFT SHOULDER PAIN: Primary | ICD-10-CM

## 2019-02-01 DIAGNOSIS — Z23 NEED FOR INFLUENZA VACCINATION: ICD-10-CM

## 2019-02-01 DIAGNOSIS — Z00.00 HEALTH MAINTENANCE EXAMINATION: ICD-10-CM

## 2019-02-01 DIAGNOSIS — G89.29 CHRONIC LEFT SHOULDER PAIN: Primary | ICD-10-CM

## 2019-02-01 DIAGNOSIS — F32.A DEPRESSION, UNSPECIFIED DEPRESSION TYPE: ICD-10-CM

## 2019-02-01 PROBLEM — M25.521 RIGHT ELBOW PAIN: Status: RESOLVED | Noted: 2018-02-21 | Resolved: 2019-02-01

## 2019-02-01 PROBLEM — R29.898 UPPER EXTREMITY WEAKNESS: Status: RESOLVED | Noted: 2018-02-21 | Resolved: 2019-02-01

## 2019-02-01 PROBLEM — R29.898 DECREASED GRIP STRENGTH OF RIGHT HAND: Status: RESOLVED | Noted: 2018-02-21 | Resolved: 2019-02-01

## 2019-02-01 PROCEDURE — 90471 FLU VACCINE (QUAD) GREATER THAN OR EQUAL TO 3YO PRESERVATIVE FREE IM: ICD-10-PCS | Mod: S$GLB,,, | Performed by: INTERNAL MEDICINE

## 2019-02-01 PROCEDURE — 3008F BODY MASS INDEX DOCD: CPT | Mod: CPTII,S$GLB,, | Performed by: INTERNAL MEDICINE

## 2019-02-01 PROCEDURE — 99999 PR PBB SHADOW E&M-EST. PATIENT-LVL III: ICD-10-PCS | Mod: PBBFAC,,, | Performed by: INTERNAL MEDICINE

## 2019-02-01 PROCEDURE — 99214 PR OFFICE/OUTPT VISIT, EST, LEVL IV, 30-39 MIN: ICD-10-PCS | Mod: 25,S$GLB,, | Performed by: INTERNAL MEDICINE

## 2019-02-01 PROCEDURE — 3008F PR BODY MASS INDEX (BMI) DOCUMENTED: ICD-10-PCS | Mod: CPTII,S$GLB,, | Performed by: INTERNAL MEDICINE

## 2019-02-01 PROCEDURE — 90471 IMMUNIZATION ADMIN: CPT | Mod: S$GLB,,, | Performed by: INTERNAL MEDICINE

## 2019-02-01 PROCEDURE — 90686 IIV4 VACC NO PRSV 0.5 ML IM: CPT | Mod: S$GLB,,, | Performed by: INTERNAL MEDICINE

## 2019-02-01 PROCEDURE — 99999 PR PBB SHADOW E&M-EST. PATIENT-LVL III: CPT | Mod: PBBFAC,,, | Performed by: INTERNAL MEDICINE

## 2019-02-01 PROCEDURE — 99214 OFFICE O/P EST MOD 30 MIN: CPT | Mod: 25,S$GLB,, | Performed by: INTERNAL MEDICINE

## 2019-02-01 PROCEDURE — 90686 FLU VACCINE (QUAD) GREATER THAN OR EQUAL TO 3YO PRESERVATIVE FREE IM: ICD-10-PCS | Mod: S$GLB,,, | Performed by: INTERNAL MEDICINE

## 2019-02-01 RX ORDER — ESCITALOPRAM OXALATE 10 MG/1
10 TABLET ORAL DAILY
Qty: 90 TABLET | Refills: 3 | Status: SHIPPED | OUTPATIENT
Start: 2019-02-01 | End: 2020-02-07 | Stop reason: SDUPTHER

## 2019-02-01 NOTE — PROGRESS NOTES
Subjective:       Patient ID: Heather Scheuermann Stark is a 48 y.o. female.    Chief Complaint: Follow-up    HPI    Last visit with me December 2018.  Prior to that seen in 2016.  I refilled the patient's Lexapro in November 2018.    Patient reports doing well overall.  She occasionally has some mild pain in the left shoulder and clavicle, but this is generally improved and not overly bothersome.    Mood is well controlled on Lexapro daily.    Patient notes weight gain of approximately 50 lb since her accident.  Notes that she is not doing regular physical activity since she got out of the habit.  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    Review of Systems   Constitutional: Negative for activity change and unexpected weight change.   HENT: Negative for hearing loss, rhinorrhea and trouble swallowing.    Eyes: Negative for discharge and visual disturbance.   Respiratory: Negative for chest tightness and wheezing.    Cardiovascular: Negative for chest pain and palpitations.   Gastrointestinal: Negative for blood in stool, constipation, diarrhea and vomiting.   Endocrine: Negative for polydipsia and polyuria.   Genitourinary: Negative for difficulty urinating, dysuria, hematuria and menstrual problem.   Musculoskeletal: Negative for arthralgias, joint swelling and neck pain.   Neurological: Negative for weakness and headaches.   Psychiatric/Behavioral: Negative for confusion and dysphoric mood.       Objective:      Physical Exam   Constitutional: She is oriented to person, place, and time. No distress.    woman whose Body mass index is 26.01 kg/m².    HENT:   Mouth/Throat: Oropharynx is clear and moist. No oropharyngeal exudate.   Neck: No thyromegaly present.   Cardiovascular: Normal rate, regular rhythm and normal heart sounds.   Pulmonary/Chest: Effort normal and breath sounds normal. No stridor. She has no wheezes. She has no rales.   Musculoskeletal:        Left  "shoulder: She exhibits normal range of motion and no swelling.   Lymphadenopathy:     She has no cervical adenopathy.   Neurological: She is alert and oriented to person, place, and time.   Psychiatric: She has a normal mood and affect. Her behavior is normal.   Nursing note and vitals reviewed.      Vitals:    02/01/19 0840   BP: 110/72   BP Location: Right arm   Patient Position: Sitting   BP Method: Large (Manual)   Pulse: 75   Height: 5' 9" (1.753 m)     Body mass index is 26.01 kg/m².    RESULTS: Reviewed labs from last 12 months    Assessment:       1. Chronic left shoulder pain    2. Weight gain    3. Depression, unspecified depression type    4. Need for influenza vaccination    5. Health maintenance examination        Plan:   Sana was seen today for follow-up.    Diagnoses and all orders for this visit:    Chronic left shoulder pain:  Prior dx, related to accident, almost entirely resolved, not limiting in daily activities or exercise.    Weight gain:  New problem, going on since accident, pt hasn't been consistent with healthy diet or doing exercise regularly.  Discussed health  and medical fitness program, patient defers these at this time.  Continue healthy eating at home.    Depression, unspecified depression type:  Prior diagnosis, stable, well controlled on current management. No changes at this time, will continue to monitor.   -     escitalopram oxalate (LEXAPRO) 10 MG tablet; Take 1 tablet (10 mg total) by mouth once daily.    Need for influenza vaccination    Health maintenance examination  -     CBC Without Differential; Future  -     Comprehensive metabolic panel; Future  -     Lipid panel; Future  -     TSH; Future    Follow-up in about 1 year (around 2/1/2020) for EPP annual exam, fasting labs 1 week prior.  Eloy Ortega MD  Internal Medicine    Portions of this note were completed using medical dictation software. Please excuse typographical or syntax errors that were missed on review. "

## 2019-04-16 ENCOUNTER — TELEPHONE (OUTPATIENT)
Dept: OBSTETRICS AND GYNECOLOGY | Facility: CLINIC | Age: 49
End: 2019-04-16

## 2019-04-16 DIAGNOSIS — Z12.31 SCREENING MAMMOGRAM, ENCOUNTER FOR: Primary | ICD-10-CM

## 2019-04-16 NOTE — TELEPHONE ENCOUNTER
Attempted to return call to the patient. No answer, left voicemail message for the patient to call the clinic back. Orders for mammogram placed. Patient just needs to schedule.   Chief complaint:   Chief Complaint   Patient presents with   • Follow-up     six month        Vitals:  Visit Vitals  BP (!) 136/94   Pulse 76   Resp 16   Ht 5' 8.5\" (1.74 m)   Wt 77 kg   BMI 25.44 kg/m²       HISTORY OF PRESENT ILLNESS     53 yo male with HBP and Hx of CVA. He is feeling well.         Other significant problems:  Patient Active Problem List    Diagnosis Date Noted   • H/O: CVA (cerebrovascular accident) 06/26/2014     Priority: Low     1999 No sequalae     • Essential hypertension, benign 01/17/2014     Priority: Low       PAST MEDICAL, FAMILY AND SOCIAL HISTORY     Medications:  Current Outpatient Prescriptions   Medication   • losartan-hydrochlorothiazide (HYZAAR) 50-12.5 MG per tablet   • HYDROcodone-acetaminophen (NORCO) 5-325 MG per tablet   • tadalafil (CIALIS) 5 MG tablet   • baclofen (LIORESAL) 10 MG tablet   • aspirin 81 MG tablet     No current facility-administered medications for this visit.        Allergies:  ALLERGIES:  No Known Allergies    Past Medical  History/Surgeries:  Past Medical History:   Diagnosis Date   • Cerebral artery occlusion with cerebral infarction (CMS/HCC) 1999   • Essential (primary) hypertension    • History of chicken pox        Past Surgical History:   Procedure Laterality Date   • Appendectomy  1999   • Spine surgery  2009    lower back surgery X 2  2nd was a fusion.    • Tonsillectomy and adenoidectomy      as a child   • Vasectomy         Family History:  Family History   Problem Relation Age of Onset   • High blood pressure Mother    • High cholesterol Mother    • High blood pressure Father    • High cholesterol Father    • Stroke Father    • Cancer Maternal Grandfather      prostate   • Cancer Paternal Grandfather      prostate       Social History:  Social History   Substance Use Topics   • Smoking status: Never Smoker   • Smokeless tobacco: Never Used   • Alcohol use 3.0 - 6.0 oz/week     5 - 10 Standard drinks or equivalent per week      Comment:  viry       REVIEW OF SYSTEMS     Review of Systems   Constitutional: Negative for chills, diaphoresis, fatigue and fever.   HENT: Negative for sore throat.    Respiratory: Negative for shortness of breath.    Cardiovascular: Negative for chest pain and palpitations.   Gastrointestinal: Negative for diarrhea, nausea and vomiting.   Neurological: Negative for dizziness and headaches.   Psychiatric/Behavioral: Negative for sleep disturbance. The patient is not nervous/anxious.        PHYSICAL EXAM     Physical Exam   Constitutional: He is oriented to person, place, and time. He appears well-developed and well-nourished.   HENT:   Right Ear: Tympanic membrane normal.   Left Ear: Tympanic membrane normal.   Nose: Nose normal.   Mouth/Throat: Oropharynx is clear and moist.   Neck: Carotid bruit is not present.   Cardiovascular: Normal rate, regular rhythm and normal heart sounds.    Pulmonary/Chest: Effort normal and breath sounds normal.   Musculoskeletal: Normal range of motion.   Lymphadenopathy:        Head (right side): No tonsillar adenopathy present.        Head (left side): No tonsillar adenopathy present.   Neurological: He is alert and oriented to person, place, and time. He has normal strength. No sensory deficit.   Reflex Scores:       Patellar reflexes are 2+ on the right side and 2+ on the left side.      ASSESSMENT/PLAN     Del was seen today for follow-up.    Diagnoses and all orders for this visit:    Essential hypertension, benign  -     losartan-hydrochlorothiazide (HYZAAR) 50-12.5 MG per tablet; Take 1 tablet by mouth daily.  -     CBC & AUTO DIFFERENTIAL; Future  -     COMPREHENSIVE METABOLIC PANEL; Future    Chronic midline low back pain without sciatica    H/O: CVA (cerebrovascular accident)  -     LIPID PANEL WITHOUT REFLEX; Future    Prostate cancer screening  -     PROSTATE SPECIFIC ANTIGEN; Future      RTC 6 months.

## 2019-04-16 NOTE — TELEPHONE ENCOUNTER
----- Message from Lu Pace sent at 4/16/2019  3:23 PM CDT -----  Contact: Pt   Name of Who is Calling: STARK,HEATHER SCHEUERMANN [[id number]]      What is the request in detail: Patient is requesting a mmg order and a call from staff to schedule her appointment and mmg on the same day. Please contact to further discuss and advise      Can the clinic reply by [facility]: No       What Number to [facility] Back [facility] not [facility]: [phone]

## 2019-05-20 ENCOUNTER — HOSPITAL ENCOUNTER (OUTPATIENT)
Dept: RADIOLOGY | Facility: HOSPITAL | Age: 49
Discharge: HOME | End: 2019-05-20
Attending: OBSTETRICS & GYNECOLOGY
Payer: COMMERCIAL

## 2019-05-20 ENCOUNTER — OFFICE VISIT (OUTPATIENT)
Dept: OBSTETRICS AND GYNECOLOGY | Facility: CLINIC | Age: 49
End: 2019-05-20
Payer: COMMERCIAL

## 2019-05-20 VITALS — HEIGHT: 67 IN | SYSTOLIC BLOOD PRESSURE: 122 MMHG | DIASTOLIC BLOOD PRESSURE: 68 MMHG | BODY MASS INDEX: 27.59 KG/M2

## 2019-05-20 DIAGNOSIS — Z12.31 SCREENING MAMMOGRAM, ENCOUNTER FOR: ICD-10-CM

## 2019-05-20 DIAGNOSIS — Z01.419 ENCOUNTER FOR GYNECOLOGICAL EXAMINATION WITHOUT ABNORMAL FINDING: Primary | ICD-10-CM

## 2019-05-20 DIAGNOSIS — N90.89 VULVAR LESION: ICD-10-CM

## 2019-05-20 PROCEDURE — 99999 PR PBB SHADOW E&M-EST. PATIENT-LVL III: CPT | Mod: PBBFAC,,, | Performed by: OBSTETRICS & GYNECOLOGY

## 2019-05-20 PROCEDURE — 77067 SCR MAMMO BI INCL CAD: CPT | Mod: 26,,, | Performed by: RADIOLOGY

## 2019-05-20 PROCEDURE — 56605 BIOPSY (GYNECOLOGICAL): ICD-10-PCS | Mod: S$GLB,,, | Performed by: OBSTETRICS & GYNECOLOGY

## 2019-05-20 PROCEDURE — 99396 PR PREVENTIVE VISIT,EST,40-64: ICD-10-PCS | Mod: 25,S$GLB,, | Performed by: OBSTETRICS & GYNECOLOGY

## 2019-05-20 PROCEDURE — 99999 PR PBB SHADOW E&M-EST. PATIENT-LVL III: ICD-10-PCS | Mod: PBBFAC,,, | Performed by: OBSTETRICS & GYNECOLOGY

## 2019-05-20 PROCEDURE — 77067 MAMMO DIGITAL SCREENING BILAT WITH TOMOSYNTHESIS_CAD: ICD-10-PCS | Mod: 26,,, | Performed by: RADIOLOGY

## 2019-05-20 PROCEDURE — 77063 MAMMO DIGITAL SCREENING BILAT WITH TOMOSYNTHESIS_CAD: ICD-10-PCS | Mod: 26,,, | Performed by: RADIOLOGY

## 2019-05-20 PROCEDURE — 99396 PREV VISIT EST AGE 40-64: CPT | Mod: 25,S$GLB,, | Performed by: OBSTETRICS & GYNECOLOGY

## 2019-05-20 PROCEDURE — 77063 BREAST TOMOSYNTHESIS BI: CPT | Mod: 26,,, | Performed by: RADIOLOGY

## 2019-05-20 PROCEDURE — 56605 BIOPSY OF VULVA/PERINEUM: CPT | Mod: S$GLB,,, | Performed by: OBSTETRICS & GYNECOLOGY

## 2019-05-20 PROCEDURE — 88305 TISSUE EXAM BY PATHOLOGIST: CPT | Performed by: PATHOLOGY

## 2019-05-20 PROCEDURE — 88305 TISSUE SPECIMEN TO PATHOLOGY, OBSTETRICS/GYNECOLOGY: ICD-10-PCS | Mod: 26,,, | Performed by: PATHOLOGY

## 2019-05-20 PROCEDURE — 77067 SCR MAMMO BI INCL CAD: CPT | Mod: TC

## 2019-05-20 NOTE — PROCEDURES
Biopsy (Gynecological)  Date/Time: 5/20/2019 8:48 AM  Performed by: Doni Hurd Jr., MD  Authorized by: Doni Hurd Jr., MD     Consent Done?:  Yes (Written)  Local anesthesia used?: Yes    Anesthesia:  Local infiltration  Local anesthetic:  Lidocaine 1% with epinephrine  Anesthetic total (ml):  3    Biopsy Location:  Vulva (RIGHT UPPER LESION COMPLETELY EXCISED WITH 4 MM PUNCH)  Vulva:     # of lesions:  1  Estimated blood loss (cc):  5   Patient tolerated the procedure well with no immediate complications.

## 2019-05-20 NOTE — PROGRESS NOTES
-FINAL PATHOLOGIC DIAGNOSIS  Right vulva, biopsy:  Seborrheic keratosis, pigmented.  Negative for dysplasia or malignancy    PT HERE FOR ANNUAL.    ROS:  GENERAL: No fever, chills, fatigability or weight loss.  VULVAR: No pain, no lesions and no itching.  VAGINAL: No relaxation, no itching, no discharge, no abnormal bleeding and no lesions.  ABDOMEN: No abdominal pain. Denies nausea. Denies vomiting. No diarrhea. No constipation  BREAST: Denies pain. No lumps. No discharge.  URINARY: No incontinence, no nocturia, no frequency and no dysuria.  CARDIOVASCULAR: No chest pain. No shortness of breath. No leg cramps.  NEUROLOGICAL: No headaches. No vision changes.  The remainder of the review of systems was negative.    PE:  General Appearance: overweight And Well developed. Well nourished. In no acute distress.  Vulva: Lesions: No.  Urethral Meatus: Normal size. Normal location. No lesions. No prolapse.  Urethra: No masses. No tenderness. No prolapse. No scarring.  Bladder: No masses. No tenderness.  Vagina: Mucosa NI:yes discharge no, atrophy no, cystocele no or rectocele no.  Cervix: Lesion: no  Stenotic: no Cervical motion tenderness: no  Uterus: Uterus size: 7 weeks. Support good. Uterus size: Normal  Adnexa: Masses: No Tenderness: No CDS Nodularity: No  Abdomen: overweight No masses. No tenderness.  Breasts: No bilateral masses. No bilateral discharge. No bilateral tenderness. No bilateral fibrocystic changes.  Neck: No thyroid enlargement. No thyroid masses.  Skin: Rashes: No      PROCEDURES:    PLAN:     DIAGNOSIS:  1. Encounter for gynecological examination without abnormal finding    2. Vulvar lesion        MEDICATIONS & ORDERS:       Patient was counseled today on the new ACS guidelines for cervical cytology screening as well as the current recommendations for breast cancer screening. She was counseled to follow up with her PCP for other routine health maintenance. Counseling session lasted approximately 10  minutes, and all her questions were answered.         FOLLOW-UP: With me in 12 month

## 2020-02-07 DIAGNOSIS — F32.A DEPRESSION, UNSPECIFIED DEPRESSION TYPE: ICD-10-CM

## 2020-02-07 NOTE — TELEPHONE ENCOUNTER
----- Message from Yelena Pepper sent at 2/7/2020  4:07 PM CST -----  Contact: Patient [phone]  Prescription Request:     Name of medication: escitalopram oxalate (LEXAPRO) 10 MG tablet    Reason for request: Refill    Pharmacy: [id number] - [place] - [address]    Please advise.    Thank You

## 2020-02-09 RX ORDER — ESCITALOPRAM OXALATE 10 MG/1
10 TABLET ORAL DAILY
Qty: 90 TABLET | Refills: 0 | Status: SHIPPED | OUTPATIENT
Start: 2020-02-09 | End: 2020-03-03 | Stop reason: SDUPTHER

## 2020-03-03 ENCOUNTER — OFFICE VISIT (OUTPATIENT)
Dept: INTERNAL MEDICINE | Facility: CLINIC | Age: 50
End: 2020-03-03
Payer: COMMERCIAL

## 2020-03-03 VITALS
DIASTOLIC BLOOD PRESSURE: 74 MMHG | WEIGHT: 179.88 LBS | BODY MASS INDEX: 28.23 KG/M2 | HEIGHT: 67 IN | SYSTOLIC BLOOD PRESSURE: 118 MMHG | HEART RATE: 87 BPM

## 2020-03-03 DIAGNOSIS — J06.9 VIRAL URI WITH COUGH: Primary | ICD-10-CM

## 2020-03-03 DIAGNOSIS — F32.A DEPRESSION, UNSPECIFIED DEPRESSION TYPE: ICD-10-CM

## 2020-03-03 PROCEDURE — 99999 PR PBB SHADOW E&M-EST. PATIENT-LVL III: CPT | Mod: PBBFAC,,, | Performed by: INTERNAL MEDICINE

## 2020-03-03 PROCEDURE — 99999 PR PBB SHADOW E&M-EST. PATIENT-LVL III: ICD-10-PCS | Mod: PBBFAC,,, | Performed by: INTERNAL MEDICINE

## 2020-03-03 PROCEDURE — 99214 OFFICE O/P EST MOD 30 MIN: CPT | Mod: S$GLB,,, | Performed by: INTERNAL MEDICINE

## 2020-03-03 PROCEDURE — 99214 PR OFFICE/OUTPT VISIT, EST, LEVL IV, 30-39 MIN: ICD-10-PCS | Mod: S$GLB,,, | Performed by: INTERNAL MEDICINE

## 2020-03-03 RX ORDER — ESCITALOPRAM OXALATE 10 MG/1
10 TABLET ORAL DAILY
Qty: 90 TABLET | Refills: 0 | Status: SHIPPED | OUTPATIENT
Start: 2020-03-03 | End: 2020-07-29

## 2020-03-03 RX ORDER — PROMETHAZINE HYDROCHLORIDE AND CODEINE PHOSPHATE 6.25; 1 MG/5ML; MG/5ML
5 SOLUTION ORAL NIGHTLY PRN
Qty: 118 ML | Refills: 0 | Status: SHIPPED | OUTPATIENT
Start: 2020-03-03 | End: 2020-03-10

## 2020-03-03 RX ORDER — PREDNISONE 20 MG/1
40 TABLET ORAL DAILY
Qty: 10 TABLET | Refills: 0 | Status: SHIPPED | OUTPATIENT
Start: 2020-03-03 | End: 2020-03-08

## 2020-03-03 NOTE — PATIENT INSTRUCTIONS
During the day use Claritin and Tylenol, you can also use over-the-counter ibuprofen or Aleve for help with body aches.

## 2020-03-03 NOTE — PROGRESS NOTES
Subjective:       Patient ID: Heather Scheuermann Hammond is a 50 y.o. female.    Chief Complaint: Cough; Sore Throat; Adenopathy; and Headache      Last visit with me 2/1/2019.  Patient notes since last Thursday has had a sore throat.  Throat feels dry and her glands are swollen.  She has had a dry cough as well.  She reports some ache in the back that has progressed to affect her general muscles.  No joint inflammation.  Has had trouble sleeping as a result cough along with alternating hot and cold symptoms.  Only sick contact has been her , who was sick 2 weeks ago but then improved.  She denies any nausea or vomiting.  She reports some mild headache with these symptoms.  She recently drove to and from [place] but no other travel, not on airplanes.  Her symptoms have not improved at all over the last 5 days.  She has taken over-the-counter Sudafed, Claritin, and Tylenol without notable relief.  She took Robitussin at night but this did not help her sleep.    Cough   Associated symptoms include headaches and a sore throat. Pertinent negatives include no ear pain or shortness of breath.   Sore Throat    This is a new problem. The current episode started in the past 7 days. The problem has been gradually worsening. Neither side of throat is experiencing more pain than the other. There has been no fever. The pain is at a severity of 5/10. The pain is moderate. Associated symptoms include congestion, coughing, headaches, a hoarse voice, neck pain, swollen glands and trouble swallowing. Pertinent negatives include no abdominal pain, diarrhea, drooling, ear discharge, ear pain, plugged ear sensation, shortness of breath, stridor or vomiting. She has had no exposure to strep or mono. She has tried NSAIDs, acetaminophen, cool liquids, gargles and oral narcotic analgesics for the symptoms. The treatment provided mild relief.   Headache    Associated symptoms include coughing, neck pain, a sore throat and swollen  "glands. Pertinent negatives include no abdominal pain, ear pain or vomiting.     Review of Systems   HENT: Positive for congestion, hoarse voice, sore throat and trouble swallowing. Negative for drooling, ear discharge and ear pain.    Respiratory: Positive for cough. Negative for shortness of breath and stridor.    Gastrointestinal: Negative for abdominal pain, diarrhea and vomiting.   Musculoskeletal: Positive for neck pain.   Neurological: Positive for headaches.       Objective:      Physical Exam   Constitutional: She is oriented to person, place, and time. No distress.   HENT:   Right Ear: Tympanic membrane normal. No tenderness.   Left Ear: Tympanic membrane normal. No tenderness.   Mouth/Throat: Oropharynx is clear and moist. No oropharyngeal exudate.   Neck: Normal range of motion. No thyromegaly present.   Cardiovascular: Normal rate, regular rhythm and normal heart sounds.   Pulmonary/Chest: Effort normal and breath sounds normal. No stridor. She has no wheezes. She has no rales.   Lymphadenopathy:     She has no cervical adenopathy.   Neurological: She is alert and oriented to person, place, and time.   Skin: She is not diaphoretic.   Nursing note and vitals reviewed.      Vitals:    03/03/20 1052   BP: 118/74   BP Location: Right arm   Patient Position: Sitting   BP Method: Medium (Manual)   Pulse: 87   Weight: 81.6 kg (179 lb 14.3 oz)   Height: 5' 7" (1.702 m)     Body mass index is 28.18 kg/m².    RESULTS: Reviewed labs from last 12 months    Assessment:       1. Viral URI with cough    2. Depression, unspecified depression type        Plan:   Sana was seen today for cough, sore throat, adenopathy and headache.    Diagnoses and all orders for this visit:    Viral URI with cough:  New problem, not worsening, possibly flu but not in window for Tamiflu. symptomatic relief with medications as below, encourage rest and hydration.  -     predniSONE (DELTASONE) 20 MG tablet; Take 2 tablets (40 mg total) " by mouth once daily. for 5 days  -     promethazine-codeine 6.25-10 mg/5 ml (PHENERGAN WITH CODEINE) 6.25-10 mg/5 mL syrup; Take 5 mLs by mouth nightly as needed for Cough.    Depression, unspecified depression type:  Prior diagnosis, stable, well controlled on current management. No changes at this time, will continue to monitor.   -     escitalopram oxalate (LEXAPRO) 10 MG tablet; Take 1 tablet (10 mg total) by mouth once daily.      Follow up in about 4 weeks (around 3/31/2020) for EPP annual exam, fasting labs 1 week prior.  Eloy Ortega MD  Internal Medicine    Portions of this note were completed using medical dictation software. Please excuse typographical or syntax errors that were missed on review.

## 2020-03-13 DIAGNOSIS — Z12.11 COLON CANCER SCREENING: ICD-10-CM

## 2020-07-28 DIAGNOSIS — F32.A DEPRESSION, UNSPECIFIED DEPRESSION TYPE: ICD-10-CM

## 2020-07-28 NOTE — TELEPHONE ENCOUNTER
No new care gaps identified.  Powered by [employer]. Reference number: [id number]. 7/28/2020 12:26:12 AM   CDT

## 2020-07-29 RX ORDER — ESCITALOPRAM OXALATE 10 MG/1
TABLET ORAL
Qty: 90 TABLET | Refills: 3 | Status: SHIPPED | OUTPATIENT
Start: 2020-07-29 | End: 2021-07-14

## 2020-07-29 NOTE — PROGRESS NOTES
Refill Routing Note   Medication(s) are not appropriate for processing by [facility]:       - Drug-Drug Interaction (escitalopram oxalate and ibuprofen)     Medication-related problems identified: Drug-drug interaction  Medication Therapy Plan: CDMR. Drug-Drug: escitalopram oxalate and ibuprofen; Concurrent use of a selective serotonin reuptake inhibitor(1-7,13) or a serotonin-norepinephrine reuptake inhibitor(8-10) and a NSAID may result in bleeding.  Medication reconciliation completed: No      Automatic Epic Generated Protocol Data:        Requested Prescriptions   Pending Prescriptions Disp Refills    escitalopram oxalate (LEXAPRO) 10 MG tablet [Pharmacy Med Name: ESCITALOPRAM 10 MG TABLET] 90 tablet 2     Sig: TAKE 1 TABLET BY MOUTH EVERY DAY       Psychiatry:  Antidepressants - SSRI Passed - 7/28/2020 12:25 AM        Passed - Patient is at least 18 years old        Passed - Negative Pregnancy Status Check        Passed - Office visit in past 6 months or future 90 days.     Recent Outpatient Visits            4 months ago Viral URI with cough    [facility] Eloy Ortega MD    1 year ago Encounter for gynecological examination without abnormal finding    [facility] [address] Doni Hurd Jr., MD    1 year ago Chronic left shoulder pain    [facility] - Internal Medicine Eloy Ortega MD    1 year ago Viral URI with cough    [facility] - Internal Medicine Eloy Ortega MD    1 year ago Viral URI with cough    [facility] - Internal Medicine Sherri Mir PA-C                          Appointments  past 12m or future 3m with PCP    Date Provider   Last Visit   3/3/2020 Eloy Ortega MD   Next Visit   Visit date not found Eloy Ortega MD   ED visits in past 90 days: 0     Note composed:12:41 PM 07/29/2020

## 2020-08-05 ENCOUNTER — TELEPHONE (OUTPATIENT)
Dept: OBSTETRICS AND GYNECOLOGY | Facility: CLINIC | Age: 50
End: 2020-08-05

## 2020-08-05 DIAGNOSIS — Z12.31 VISIT FOR SCREENING MAMMOGRAM: Primary | ICD-10-CM

## 2020-08-05 NOTE — TELEPHONE ENCOUNTER
----- Message from Gracie Fernandez sent at 8/4/2020  4:11 PM CDT -----  Regarding: orders  Name of Who is Calling: HAMMOND, HEATHER SCHEUERMANN [[id number]]      What is the request in detail: mammogram orders      Can the clinic reply by [facility]: no      What Number to Call Back if not in [facility]: [phone]

## 2020-08-11 ENCOUNTER — PATIENT OUTREACH (OUTPATIENT)
Dept: ADMINISTRATIVE | Facility: OTHER | Age: 50
End: 2020-08-11

## 2020-08-11 NOTE — PROGRESS NOTES
Care Everywhere:   Immunization: updated  Health Maintenance: updated  Media Review: reviewed for outside colon cancer report   Legacy Review:   Order placed:   Upcoming appts:mammogram 8/13/2020

## 2020-08-13 ENCOUNTER — OFFICE VISIT (OUTPATIENT)
Dept: OBSTETRICS AND GYNECOLOGY | Facility: CLINIC | Age: 50
End: 2020-08-13
Payer: COMMERCIAL

## 2020-08-13 ENCOUNTER — HOSPITAL ENCOUNTER (OUTPATIENT)
Dept: RADIOLOGY | Facility: OTHER | Age: 50
Discharge: HOME | End: 2020-08-13
Attending: OBSTETRICS & GYNECOLOGY
Payer: COMMERCIAL

## 2020-08-13 VITALS — BODY MASS INDEX: 29.41 KG/M2 | WEIGHT: 187.38 LBS | HEIGHT: 67 IN

## 2020-08-13 DIAGNOSIS — Z12.11 SCREEN FOR COLON CANCER: ICD-10-CM

## 2020-08-13 DIAGNOSIS — Z01.419 ENCOUNTER FOR GYNECOLOGICAL EXAMINATION WITHOUT ABNORMAL FINDING: Primary | ICD-10-CM

## 2020-08-13 DIAGNOSIS — Z12.31 VISIT FOR SCREENING MAMMOGRAM: ICD-10-CM

## 2020-08-13 PROCEDURE — 77067 SCR MAMMO BI INCL CAD: CPT | Mod: 26,,, | Performed by: RADIOLOGY

## 2020-08-13 PROCEDURE — 77067 MAMMO DIGITAL SCREENING BILAT WITH TOMOSYNTHESIS_CAD: ICD-10-PCS | Mod: 26,,, | Performed by: RADIOLOGY

## 2020-08-13 PROCEDURE — 77063 BREAST TOMOSYNTHESIS BI: CPT | Mod: 26,,, | Performed by: RADIOLOGY

## 2020-08-13 PROCEDURE — 99999 PR PBB SHADOW E&M-EST. PATIENT-LVL III: CPT | Mod: PBBFAC,,, | Performed by: OBSTETRICS & GYNECOLOGY

## 2020-08-13 PROCEDURE — 99396 PREV VISIT EST AGE 40-64: CPT | Mod: S$GLB,,, | Performed by: OBSTETRICS & GYNECOLOGY

## 2020-08-13 PROCEDURE — 77067 SCR MAMMO BI INCL CAD: CPT | Mod: TC

## 2020-08-13 PROCEDURE — 99396 PR PREVENTIVE VISIT,EST,40-64: ICD-10-PCS | Mod: S$GLB,,, | Performed by: OBSTETRICS & GYNECOLOGY

## 2020-08-13 PROCEDURE — 99999 PR PBB SHADOW E&M-EST. PATIENT-LVL III: ICD-10-PCS | Mod: PBBFAC,,, | Performed by: OBSTETRICS & GYNECOLOGY

## 2020-08-13 PROCEDURE — 77063 MAMMO DIGITAL SCREENING BILAT WITH TOMOSYNTHESIS_CAD: ICD-10-PCS | Mod: 26,,, | Performed by: RADIOLOGY

## 2020-08-13 NOTE — PROGRESS NOTES
PT HERE FOR ANNUAL.  JUST  IN December.  NEEDS COLONOSCOPY.    ROS:  GENERAL: No fever, chills, fatigability or weight loss.  VULVAR: No pain, no lesions and no itching.  VAGINAL: No relaxation, no itching, no discharge, no abnormal bleeding and no lesions.  ABDOMEN: No abdominal pain. Denies nausea. Denies vomiting. No diarrhea. No constipation  BREAST: Denies pain. No lumps. No discharge.  URINARY: No incontinence, no nocturia, no frequency and no dysuria.  CARDIOVASCULAR: No chest pain. No shortness of breath. No leg cramps.  NEUROLOGICAL: No headaches. No vision changes.  The remainder of the review of systems was negative.    PE:  General Appearance: overweight And Well developed. Well nourished. In no acute distress.  Vulva: Lesions: No.  Urethral Meatus: Normal size. Normal location. No lesions. No prolapse.  Urethra: No masses. No tenderness. No prolapse. No scarring.  Bladder: No masses. No tenderness.  Vagina: Mucosa NI:yes discharge no, atrophy no, cystocele no or rectocele no.  Cervix: Lesion: no  Stenotic: no Cervical motion tenderness: no  Uterus: Uterus size: 6 weeks. Support good. Uterus size: Normal  Adnexa: Masses: No Tenderness: No CDS Nodularity: No  Abdomen: overweight No masses. No tenderness.  Breasts: No bilateral masses. No bilateral discharge. No bilateral tenderness. No bilateral fibrocystic changes.  Neck: No thyroid enlargement. No thyroid masses.  Skin: Rashes: No      PROCEDURES:    PLAN:     DIAGNOSIS:  1. Encounter for gynecological examination without abnormal finding    2. Screen for colon cancer        MEDICATIONS & ORDERS:  Orders Placed This Encounter    Ambulatory referral/consult to Colorectal Surgery       Patient was counseled today on the new ACS guidelines for cervical cytology screening as well as the current recommendations for breast cancer screening. She was counseled to follow up with her PCP for other routine health maintenance. Counseling session lasted  approximately 10 minutes, and all her questions were answered.         FOLLOW-UP: With me in 12 month

## 2020-08-31 ENCOUNTER — TELEPHONE (OUTPATIENT)
Dept: PULMONOLOGY | Facility: CLINIC | Age: 50
End: 2020-08-31

## 2020-08-31 DIAGNOSIS — R06.09 DOE (DYSPNEA ON EXERTION): Primary | ICD-10-CM

## 2020-08-31 NOTE — TELEPHONE ENCOUNTER
I spoke to patient to schedule covid test on 9/1/20 at 8:40am prior to pft's. Appointment scheduled on 9/3/20 at 1:30pm with Dr Mckeon. Patient to come for pft's for 12:30pm on 9/3/20. Patient verbalized understanding.

## 2020-09-01 ENCOUNTER — LAB VISIT (OUTPATIENT)
Dept: SURGERY | Facility: CLINIC | Age: 50
End: 2020-09-01
Payer: COMMERCIAL

## 2020-09-01 DIAGNOSIS — R06.09 DOE (DYSPNEA ON EXERTION): ICD-10-CM

## 2020-09-01 LAB — SARS-COV-2 RNA RESP QL NAA+PROBE: NOT DETECTED

## 2020-09-01 PROCEDURE — U0003 INFECTIOUS AGENT DETECTION BY NUCLEIC ACID (DNA OR RNA); SEVERE ACUTE RESPIRATORY SYNDROME CORONAVIRUS 2 (SARS-COV-2) (CORONAVIRUS DISEASE [COVID-19]), AMPLIFIED PROBE TECHNIQUE, MAKING USE OF HIGH THROUGHPUT TECHNOLOGIES AS DESCRIBED BY CMS-2020-01-R: HCPCS

## 2020-09-03 ENCOUNTER — HOSPITAL ENCOUNTER (OUTPATIENT)
Dept: PULMONOLOGY | Facility: CLINIC | Age: 50
Discharge: HOME | End: 2020-09-03
Payer: COMMERCIAL

## 2020-09-03 ENCOUNTER — OFFICE VISIT (OUTPATIENT)
Dept: PULMONOLOGY | Facility: CLINIC | Age: 50
End: 2020-09-03
Payer: COMMERCIAL

## 2020-09-03 ENCOUNTER — HOSPITAL ENCOUNTER (OUTPATIENT)
Dept: RADIOLOGY | Facility: HOSPITAL | Age: 50
Discharge: HOME | End: 2020-09-03
Attending: INTERNAL MEDICINE
Payer: COMMERCIAL

## 2020-09-03 VITALS — WEIGHT: 187.81 LBS | HEIGHT: 68 IN | BODY MASS INDEX: 28.46 KG/M2

## 2020-09-03 VITALS
DIASTOLIC BLOOD PRESSURE: 76 MMHG | HEIGHT: 68 IN | HEART RATE: 78 BPM | SYSTOLIC BLOOD PRESSURE: 132 MMHG | BODY MASS INDEX: 28.34 KG/M2 | WEIGHT: 187 LBS | OXYGEN SATURATION: 99 %

## 2020-09-03 DIAGNOSIS — R06.09 DOE (DYSPNEA ON EXERTION): Primary | ICD-10-CM

## 2020-09-03 DIAGNOSIS — R06.09 DOE (DYSPNEA ON EXERTION): ICD-10-CM

## 2020-09-03 PROCEDURE — 71046 X-RAY EXAM CHEST 2 VIEWS: CPT | Mod: 26,,, | Performed by: RADIOLOGY

## 2020-09-03 PROCEDURE — 94010 BREATHING CAPACITY TEST: CPT | Mod: S$GLB,,, | Performed by: INTERNAL MEDICINE

## 2020-09-03 PROCEDURE — 94010 BREATHING CAPACITY TEST: ICD-10-PCS | Mod: S$GLB,,, | Performed by: INTERNAL MEDICINE

## 2020-09-03 PROCEDURE — 94727 GAS DIL/WSHOT DETER LNG VOL: CPT | Mod: S$GLB,,, | Performed by: INTERNAL MEDICINE

## 2020-09-03 PROCEDURE — 94727 PR PULM FUNCTION TEST BY GAS: ICD-10-PCS | Mod: S$GLB,,, | Performed by: INTERNAL MEDICINE

## 2020-09-03 PROCEDURE — 99999 PR PBB SHADOW E&M-EST. PATIENT-LVL III: ICD-10-PCS | Mod: PBBFAC,,, | Performed by: INTERNAL MEDICINE

## 2020-09-03 PROCEDURE — 94729 DIFFUSING CAPACITY: CPT | Mod: S$GLB,,, | Performed by: INTERNAL MEDICINE

## 2020-09-03 PROCEDURE — 99203 PR OFFICE/OUTPT VISIT, NEW, LEVL III, 30-44 MIN: ICD-10-PCS | Mod: S$GLB,,, | Performed by: INTERNAL MEDICINE

## 2020-09-03 PROCEDURE — 71046 XR CHEST PA AND LATERAL: ICD-10-PCS | Mod: 26,,, | Performed by: RADIOLOGY

## 2020-09-03 PROCEDURE — 71046 X-RAY EXAM CHEST 2 VIEWS: CPT | Mod: TC,FY

## 2020-09-03 PROCEDURE — 99203 OFFICE O/P NEW LOW 30 MIN: CPT | Mod: S$GLB,,, | Performed by: INTERNAL MEDICINE

## 2020-09-03 PROCEDURE — 94618 PULMONARY STRESS TESTING: ICD-10-PCS | Mod: S$GLB,,, | Performed by: INTERNAL MEDICINE

## 2020-09-03 PROCEDURE — 99999 PR PBB SHADOW E&M-EST. PATIENT-LVL III: CPT | Mod: PBBFAC,,, | Performed by: INTERNAL MEDICINE

## 2020-09-03 PROCEDURE — 94618 PULMONARY STRESS TESTING: CPT | Mod: S$GLB,,, | Performed by: INTERNAL MEDICINE

## 2020-09-03 PROCEDURE — 94729 PR C02/MEMBANE DIFFUSE CAPACITY: ICD-10-PCS | Mod: S$GLB,,, | Performed by: INTERNAL MEDICINE

## 2020-09-03 NOTE — PROCEDURES
Heather Scheuermann Hammond is a 50 y.o.  female patient, who presents for a 6 minute walk test ordered by MD Linda.  The diagnosis is Shortness of Breath.  The patient's BMI is 28.6 kg/m2.  Predicted distance (lower limit of normal) is 408.04 meters.      Test Results:    The test was completed without stopping.  The total time walked was 360 seconds.  During walking, the patient reported:  Dyspnea; Chest heaviness.  The patient used no assistive devices during testing.     09/03/2020---------Distance: 441.96 meters (1450 feet)     O2 Sat % Supplemental Oxygen Heart Rate Blood Pressure Trent Scale   Pre-exercise  (Resting) 98 % Room Air 71 bpm 136/71 mmHg 1   During Exercise 97 % Room Air 91 bpm 132/76 mmHg 4   Post-exercise  (Recovery) 99 % Room Air  78 bpm       Recovery Time: 102 seconds    Performing nurse/tech: Fer MAYFIELD      PREVIOUS STUDY:   The patient has not had a previous study.      CLINICAL INTERPRETATION:  Six minute walk distance is 441.96 meters (1450 feet) with somewhat heavy dyspnea.  During exercise, there was no significant desaturation while breathing room air.  Blood pressure remained stable and Heart rate increased significantly with walking.  The patient reported non-pulmonary symptoms during exercise.  No previous study performed.  Based upon age and body mass index, exercise capacity is normal.
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
## 2020-09-03 NOTE — ASSESSMENT & PLAN NOTE
PFTs have a slightly reduced FEV1 and FVC with a normal ratio.  TLC is WNL (81PPD). DLCO is normal.  No exertional oxygen needs were detected on a pulmonary stress test.  Patient may have a mild non-specific obstruction, but she does not have any evidence of air trapping on lung volumes.    No imaging available since 2015.  Plan for CXR for further evaluation.     Discussed the role of weight and dyspnea.  Advised lifestyle modification and continued exercise.

## 2020-10-05 ENCOUNTER — PATIENT MESSAGE (OUTPATIENT)
Dept: ADMINISTRATIVE | Facility: HOSPITAL | Age: 50
End: 2020-10-05

## 2020-11-16 ENCOUNTER — PATIENT MESSAGE (OUTPATIENT)
Dept: INTERNAL MEDICINE | Facility: CLINIC | Age: 50
End: 2020-11-16

## 2020-11-17 ENCOUNTER — PATIENT OUTREACH (OUTPATIENT)
Dept: ADMINISTRATIVE | Facility: HOSPITAL | Age: 50
End: 2020-11-17

## 2021-01-04 ENCOUNTER — PATIENT MESSAGE (OUTPATIENT)
Dept: ADMINISTRATIVE | Facility: HOSPITAL | Age: 51
End: 2021-01-04

## 2021-03-05 ENCOUNTER — PATIENT MESSAGE (OUTPATIENT)
Dept: OPTOMETRY | Facility: CLINIC | Age: 51
End: 2021-03-05

## 2021-04-05 ENCOUNTER — PATIENT MESSAGE (OUTPATIENT)
Dept: ADMINISTRATIVE | Facility: HOSPITAL | Age: 51
End: 2021-04-05

## 2021-04-22 ENCOUNTER — PATIENT MESSAGE (OUTPATIENT)
Dept: UROLOGY | Facility: CLINIC | Age: 51
End: 2021-04-22

## 2021-05-21 ENCOUNTER — TELEPHONE (OUTPATIENT)
Dept: GASTROENTEROLOGY | Facility: CLINIC | Age: 51
End: 2021-05-21

## 2021-05-21 DIAGNOSIS — Z80.0 FAMILY HISTORY OF LYNCH SYNDROME: Primary | ICD-10-CM

## 2021-05-21 DIAGNOSIS — Z12.11 ENCOUNTER FOR SCREENING COLONOSCOPY: ICD-10-CM

## 2021-05-24 ENCOUNTER — TELEPHONE (OUTPATIENT)
Dept: GASTROENTEROLOGY | Facility: CLINIC | Age: 51
End: 2021-05-24

## 2021-05-28 ENCOUNTER — PATIENT MESSAGE (OUTPATIENT)
Dept: ENDOSCOPY | Facility: HOSPITAL | Age: 51
End: 2021-05-28

## 2021-05-28 DIAGNOSIS — Z12.11 SPECIAL SCREENING FOR MALIGNANT NEOPLASMS, COLON: Primary | ICD-10-CM

## 2021-05-28 RX ORDER — SODIUM, POTASSIUM,MAG SULFATES 17.5-3.13G
1 SOLUTION, RECONSTITUTED, ORAL ORAL DAILY
Qty: 1 KIT | Refills: 0 | Status: SHIPPED | OUTPATIENT
Start: 2021-05-28 | End: 2021-05-30

## 2021-06-02 ENCOUNTER — PATIENT MESSAGE (OUTPATIENT)
Dept: ENDOSCOPY | Facility: HOSPITAL | Age: 51
End: 2021-06-02

## 2021-06-02 ENCOUNTER — TELEPHONE (OUTPATIENT)
Dept: GASTROENTEROLOGY | Facility: CLINIC | Age: 51
End: 2021-06-02

## 2021-06-15 ENCOUNTER — OFFICE VISIT (OUTPATIENT)
Dept: HEMATOLOGY/ONCOLOGY | Facility: CLINIC | Age: 51
End: 2021-06-15
Payer: COMMERCIAL

## 2021-06-15 DIAGNOSIS — Z12.11 ENCOUNTER FOR SCREENING COLONOSCOPY: ICD-10-CM

## 2021-06-15 DIAGNOSIS — Z71.83 ENCOUNTER FOR NONPROCREATIVE GENETIC COUNSELING: Primary | ICD-10-CM

## 2021-06-15 PROCEDURE — 99205 OFFICE O/P NEW HI 60 MIN: CPT | Mod: S$GLB,,, | Performed by: NURSE PRACTITIONER

## 2021-06-15 PROCEDURE — 99205 PR OFFICE/OUTPT VISIT, NEW, LEVL V, 60-74 MIN: ICD-10-PCS | Mod: S$GLB,,, | Performed by: NURSE PRACTITIONER

## 2021-06-15 PROCEDURE — 99999 PR PBB SHADOW E&M-EST. PATIENT-LVL II: CPT | Mod: PBBFAC,,, | Performed by: NURSE PRACTITIONER

## 2021-06-15 PROCEDURE — 99999 PR PBB SHADOW E&M-EST. PATIENT-LVL II: ICD-10-PCS | Mod: PBBFAC,,, | Performed by: NURSE PRACTITIONER

## 2021-06-28 ENCOUNTER — OFFICE VISIT (OUTPATIENT)
Dept: INTERNAL MEDICINE | Facility: CLINIC | Age: 51
End: 2021-06-28
Payer: COMMERCIAL

## 2021-06-28 VITALS
HEIGHT: 68 IN | OXYGEN SATURATION: 96 % | WEIGHT: 187 LBS | BODY MASS INDEX: 28.34 KG/M2 | HEART RATE: 72 BPM | SYSTOLIC BLOOD PRESSURE: 122 MMHG | DIASTOLIC BLOOD PRESSURE: 72 MMHG

## 2021-06-28 DIAGNOSIS — M54.9 CHRONIC BACK PAIN, UNSPECIFIED BACK LOCATION, UNSPECIFIED BACK PAIN LATERALITY: ICD-10-CM

## 2021-06-28 DIAGNOSIS — M51.37 DEGENERATION OF LUMBAR OR LUMBOSACRAL INTERVERTEBRAL DISC: ICD-10-CM

## 2021-06-28 DIAGNOSIS — Z00.00 ANNUAL PHYSICAL EXAM: Primary | ICD-10-CM

## 2021-06-28 DIAGNOSIS — G89.29 CHRONIC BACK PAIN, UNSPECIFIED BACK LOCATION, UNSPECIFIED BACK PAIN LATERALITY: ICD-10-CM

## 2021-06-28 PROCEDURE — 99396 PR PREVENTIVE VISIT,EST,40-64: ICD-10-PCS | Mod: S$GLB,,, | Performed by: INTERNAL MEDICINE

## 2021-06-28 PROCEDURE — 99999 PR PBB SHADOW E&M-EST. PATIENT-LVL III: ICD-10-PCS | Mod: PBBFAC,,, | Performed by: INTERNAL MEDICINE

## 2021-06-28 PROCEDURE — 99999 PR PBB SHADOW E&M-EST. PATIENT-LVL III: CPT | Mod: PBBFAC,,, | Performed by: INTERNAL MEDICINE

## 2021-06-28 PROCEDURE — 99396 PREV VISIT EST AGE 40-64: CPT | Mod: S$GLB,,, | Performed by: INTERNAL MEDICINE

## 2021-06-30 ENCOUNTER — LAB VISIT (OUTPATIENT)
Dept: LAB | Facility: HOSPITAL | Age: 51
End: 2021-06-30
Attending: INTERNAL MEDICINE
Payer: COMMERCIAL

## 2021-06-30 DIAGNOSIS — Z00.00 ANNUAL PHYSICAL EXAM: ICD-10-CM

## 2021-06-30 LAB
ALBUMIN SERPL BCP-MCNC: 4.1 G/DL (ref 3.5–5.2)
ALP SERPL-CCNC: 85 U/L (ref 55–135)
ALT SERPL W/O P-5'-P-CCNC: 25 U/L (ref 10–44)
ANION GAP SERPL CALC-SCNC: 10 MMOL/L (ref 8–16)
AST SERPL-CCNC: 18 U/L (ref 10–40)
BASOPHILS # BLD AUTO: 0.05 K/UL (ref 0–0.2)
BASOPHILS NFR BLD: 0.8 % (ref 0–1.9)
BILIRUB SERPL-MCNC: 0.5 MG/DL (ref 0.1–1)
BUN SERPL-MCNC: 16 MG/DL (ref 6–20)
CALCIUM SERPL-MCNC: 9.6 MG/DL (ref 8.7–10.5)
CHLORIDE SERPL-SCNC: 102 MMOL/L (ref 95–110)
CHOLEST SERPL-MCNC: 179 MG/DL (ref 120–199)
CHOLEST/HDLC SERPL: 3.5 {RATIO} (ref 2–5)
CO2 SERPL-SCNC: 25 MMOL/L (ref 23–29)
CREAT SERPL-MCNC: 0.8 MG/DL (ref 0.5–1.4)
DIFFERENTIAL METHOD: NORMAL
EOSINOPHIL # BLD AUTO: 0.2 K/UL (ref 0–0.5)
EOSINOPHIL NFR BLD: 3.3 % (ref 0–8)
ERYTHROCYTE [DISTWIDTH] IN BLOOD BY AUTOMATED COUNT: 12.2 % (ref 11.5–14.5)
EST. GFR  (AFRICAN AMERICAN): >60 ML/MIN/1.73 M^2
EST. GFR  (NON AFRICAN AMERICAN): >60 ML/MIN/1.73 M^2
FSH SERPL-ACNC: 3.8 MIU/ML
GLUCOSE SERPL-MCNC: 102 MG/DL (ref 70–110)
HCT VFR BLD AUTO: 40.7 % (ref 37–48.5)
HCV AB SERPL QL IA: NEGATIVE
HDLC SERPL-MCNC: 51 MG/DL (ref 40–75)
HDLC SERPL: 28.5 % (ref 20–50)
HGB BLD-MCNC: 13.5 G/DL (ref 12–16)
HIV 1+2 AB+HIV1 P24 AG SERPL QL IA: NEGATIVE
IMM GRANULOCYTES # BLD AUTO: 0.02 K/UL (ref 0–0.04)
IMM GRANULOCYTES NFR BLD AUTO: 0.3 % (ref 0–0.5)
LDLC SERPL CALC-MCNC: 109.4 MG/DL (ref 63–159)
LYMPHOCYTES # BLD AUTO: 1.4 K/UL (ref 1–4.8)
LYMPHOCYTES NFR BLD: 23.1 % (ref 18–48)
MCH RBC QN AUTO: 30.1 PG (ref 27–31)
MCHC RBC AUTO-ENTMCNC: 33.2 G/DL (ref 32–36)
MCV RBC AUTO: 91 FL (ref 82–98)
MONOCYTES # BLD AUTO: 0.5 K/UL (ref 0.3–1)
MONOCYTES NFR BLD: 7.6 % (ref 4–15)
NEUTROPHILS # BLD AUTO: 3.9 K/UL (ref 1.8–7.7)
NEUTROPHILS NFR BLD: 64.9 % (ref 38–73)
NONHDLC SERPL-MCNC: 128 MG/DL
NRBC BLD-RTO: 0 /100 WBC
PLATELET # BLD AUTO: 275 K/UL (ref 150–450)
PMV BLD AUTO: 9.9 FL (ref 9.2–12.9)
POTASSIUM SERPL-SCNC: 4.7 MMOL/L (ref 3.5–5.1)
PROT SERPL-MCNC: 7.7 G/DL (ref 6–8.4)
RBC # BLD AUTO: 4.48 M/UL (ref 4–5.4)
SODIUM SERPL-SCNC: 137 MMOL/L (ref 136–145)
TRIGL SERPL-MCNC: 93 MG/DL (ref 30–150)
TSH SERPL DL<=0.005 MIU/L-ACNC: 0.97 UIU/ML (ref 0.4–4)
WBC # BLD AUTO: 6.05 K/UL (ref 3.9–12.7)

## 2021-06-30 PROCEDURE — 87389 HIV-1 AG W/HIV-1&-2 AB AG IA: CPT | Performed by: INTERNAL MEDICINE

## 2021-06-30 PROCEDURE — 83001 ASSAY OF GONADOTROPIN (FSH): CPT | Performed by: INTERNAL MEDICINE

## 2021-06-30 PROCEDURE — 36415 COLL VENOUS BLD VENIPUNCTURE: CPT | Performed by: INTERNAL MEDICINE

## 2021-06-30 PROCEDURE — 80061 LIPID PANEL: CPT | Performed by: INTERNAL MEDICINE

## 2021-06-30 PROCEDURE — 80053 COMPREHEN METABOLIC PANEL: CPT | Performed by: INTERNAL MEDICINE

## 2021-06-30 PROCEDURE — 84443 ASSAY THYROID STIM HORMONE: CPT | Performed by: INTERNAL MEDICINE

## 2021-06-30 PROCEDURE — 85025 COMPLETE CBC W/AUTO DIFF WBC: CPT | Performed by: INTERNAL MEDICINE

## 2021-06-30 PROCEDURE — 86803 HEPATITIS C AB TEST: CPT | Performed by: INTERNAL MEDICINE

## 2021-07-08 ENCOUNTER — ANESTHESIA (OUTPATIENT)
Dept: ENDOSCOPY | Facility: HOSPITAL | Age: 51
End: 2021-07-08
Payer: COMMERCIAL

## 2021-07-08 ENCOUNTER — ANESTHESIA EVENT (OUTPATIENT)
Dept: ENDOSCOPY | Facility: HOSPITAL | Age: 51
End: 2021-07-08
Payer: COMMERCIAL

## 2021-07-08 ENCOUNTER — HOSPITAL ENCOUNTER (OUTPATIENT)
Facility: HOSPITAL | Age: 51
Discharge: HOME | End: 2021-07-08
Attending: INTERNAL MEDICINE | Admitting: INTERNAL MEDICINE
Payer: COMMERCIAL

## 2021-07-08 VITALS
OXYGEN SATURATION: 97 % | HEART RATE: 58 BPM | RESPIRATION RATE: 16 BRPM | TEMPERATURE: 99 F | BODY MASS INDEX: 27.28 KG/M2 | DIASTOLIC BLOOD PRESSURE: 72 MMHG | HEIGHT: 68 IN | SYSTOLIC BLOOD PRESSURE: 118 MMHG | WEIGHT: 180 LBS

## 2021-07-08 DIAGNOSIS — Z80.0 FAMILY HISTORY OF COLON CANCER: ICD-10-CM

## 2021-07-08 LAB
B-HCG UR QL: NEGATIVE
CTP QC/QA: YES

## 2021-07-08 PROCEDURE — G0105 COLORECTAL SCRN; HI RISK IND: HCPCS | Performed by: INTERNAL MEDICINE

## 2021-07-08 PROCEDURE — G0105 COLORECTAL SCRN; HI RISK IND: HCPCS | Mod: ,,, | Performed by: INTERNAL MEDICINE

## 2021-07-08 PROCEDURE — E9220 PRA ENDO ANESTHESIA: HCPCS | Mod: ,,, | Performed by: NURSE ANESTHETIST, CERTIFIED REGISTERED

## 2021-07-08 PROCEDURE — 37000008 HC ANESTHESIA 1ST 15 MINUTES: Performed by: INTERNAL MEDICINE

## 2021-07-08 PROCEDURE — 25000003 PHARM REV CODE 250: Performed by: INTERNAL MEDICINE

## 2021-07-08 PROCEDURE — 81025 URINE PREGNANCY TEST: CPT | Performed by: INTERNAL MEDICINE

## 2021-07-08 PROCEDURE — G0105 COLORECTAL SCRN; HI RISK IND: ICD-10-PCS | Mod: ,,, | Performed by: INTERNAL MEDICINE

## 2021-07-08 PROCEDURE — 37000009 HC ANESTHESIA EA ADD 15 MINS: Performed by: INTERNAL MEDICINE

## 2021-07-08 PROCEDURE — E9220 PRA ENDO ANESTHESIA: ICD-10-PCS | Mod: ,,, | Performed by: NURSE ANESTHETIST, CERTIFIED REGISTERED

## 2021-07-08 PROCEDURE — 63600175 PHARM REV CODE 636 W HCPCS: Performed by: NURSE ANESTHETIST, CERTIFIED REGISTERED

## 2021-07-08 RX ORDER — SODIUM CHLORIDE 9 MG/ML
INJECTION, SOLUTION INTRAVENOUS CONTINUOUS
Status: DISCONTINUED | OUTPATIENT
Start: 2021-07-08 | End: 2021-07-08 | Stop reason: HOSPADM

## 2021-07-08 RX ORDER — LIDOCAINE HCL/PF 100 MG/5ML
SYRINGE (ML) INTRAVENOUS
Status: DISCONTINUED | OUTPATIENT
Start: 2021-07-08 | End: 2021-07-08

## 2021-07-08 RX ORDER — PROPOFOL 10 MG/ML
VIAL (ML) INTRAVENOUS
Status: DISCONTINUED | OUTPATIENT
Start: 2021-07-08 | End: 2021-07-08

## 2021-07-08 RX ADMIN — PROPOFOL 50 MG: 10 INJECTION, EMULSION INTRAVENOUS at 08:07

## 2021-07-08 RX ADMIN — PROPOFOL 30 MG: 10 INJECTION, EMULSION INTRAVENOUS at 08:07

## 2021-07-08 RX ADMIN — SODIUM CHLORIDE: 0.9 INJECTION, SOLUTION INTRAVENOUS at 08:07

## 2021-07-08 RX ADMIN — Medication 50 MG: at 08:07

## 2021-07-08 RX ADMIN — PROPOFOL 100 MG: 10 INJECTION, EMULSION INTRAVENOUS at 08:07

## 2021-07-12 DIAGNOSIS — F32.A DEPRESSION, UNSPECIFIED DEPRESSION TYPE: ICD-10-CM

## 2021-07-13 ENCOUNTER — PATIENT OUTREACH (OUTPATIENT)
Dept: ADMINISTRATIVE | Facility: HOSPITAL | Age: 51
End: 2021-07-13

## 2021-07-14 RX ORDER — ESCITALOPRAM OXALATE 10 MG/1
TABLET ORAL
Qty: 90 TABLET | Refills: 3 | Status: SHIPPED | OUTPATIENT
Start: 2021-07-14 | End: 2022-07-05

## 2021-08-02 ENCOUNTER — PATIENT OUTREACH (OUTPATIENT)
Dept: ADMINISTRATIVE | Facility: HOSPITAL | Age: 51
End: 2021-08-02

## 2021-08-12 ENCOUNTER — PATIENT OUTREACH (OUTPATIENT)
Dept: ADMINISTRATIVE | Facility: OTHER | Age: 51
End: 2021-08-12

## 2021-08-12 ENCOUNTER — PATIENT MESSAGE (OUTPATIENT)
Dept: ADMINISTRATIVE | Facility: OTHER | Age: 51
End: 2021-08-12

## 2021-08-12 DIAGNOSIS — Z12.31 ENCOUNTER FOR SCREENING MAMMOGRAM FOR MALIGNANT NEOPLASM OF BREAST: Primary | ICD-10-CM

## 2021-08-18 ENCOUNTER — OFFICE VISIT (OUTPATIENT)
Dept: OBSTETRICS AND GYNECOLOGY | Facility: CLINIC | Age: 51
End: 2021-08-18
Payer: COMMERCIAL

## 2021-08-18 VITALS
BODY MASS INDEX: 27.93 KG/M2 | WEIGHT: 184.31 LBS | HEIGHT: 68 IN | SYSTOLIC BLOOD PRESSURE: 118 MMHG | DIASTOLIC BLOOD PRESSURE: 72 MMHG

## 2021-08-18 DIAGNOSIS — Z01.419 ENCOUNTER FOR GYNECOLOGICAL EXAMINATION WITHOUT ABNORMAL FINDING: Primary | ICD-10-CM

## 2021-08-18 PROCEDURE — 99396 PR PREVENTIVE VISIT,EST,40-64: ICD-10-PCS | Mod: S$GLB,,, | Performed by: OBSTETRICS & GYNECOLOGY

## 2021-08-18 PROCEDURE — 88175 CYTOPATH C/V AUTO FLUID REDO: CPT | Performed by: OBSTETRICS & GYNECOLOGY

## 2021-08-18 PROCEDURE — 99999 PR PBB SHADOW E&M-EST. PATIENT-LVL III: CPT | Mod: PBBFAC,,, | Performed by: OBSTETRICS & GYNECOLOGY

## 2021-08-18 PROCEDURE — 99999 PR PBB SHADOW E&M-EST. PATIENT-LVL III: ICD-10-PCS | Mod: PBBFAC,,, | Performed by: OBSTETRICS & GYNECOLOGY

## 2021-08-18 PROCEDURE — 99396 PREV VISIT EST AGE 40-64: CPT | Mod: S$GLB,,, | Performed by: OBSTETRICS & GYNECOLOGY

## 2021-08-20 ENCOUNTER — HOSPITAL ENCOUNTER (OUTPATIENT)
Dept: RADIOLOGY | Facility: HOSPITAL | Age: 51
Discharge: HOME | End: 2021-08-20
Attending: INTERNAL MEDICINE
Payer: COMMERCIAL

## 2021-08-20 VITALS — WEIGHT: 180 LBS | HEIGHT: 69 IN | BODY MASS INDEX: 26.66 KG/M2

## 2021-08-20 DIAGNOSIS — Z12.31 ENCOUNTER FOR SCREENING MAMMOGRAM FOR MALIGNANT NEOPLASM OF BREAST: ICD-10-CM

## 2021-08-20 PROCEDURE — 77067 SCR MAMMO BI INCL CAD: CPT | Mod: TC

## 2021-08-20 PROCEDURE — 77063 MAMMO DIGITAL SCREENING BILAT WITH TOMO: ICD-10-PCS | Mod: 26,,, | Performed by: RADIOLOGY

## 2021-08-20 PROCEDURE — 77063 BREAST TOMOSYNTHESIS BI: CPT | Mod: 26,,, | Performed by: RADIOLOGY

## 2021-08-20 PROCEDURE — 77067 SCR MAMMO BI INCL CAD: CPT | Mod: 26,,, | Performed by: RADIOLOGY

## 2021-08-20 PROCEDURE — 77067 MAMMO DIGITAL SCREENING BILAT WITH TOMO: ICD-10-PCS | Mod: 26,,, | Performed by: RADIOLOGY

## 2021-08-21 ENCOUNTER — TELEPHONE (OUTPATIENT)
Dept: ENDOSCOPY | Facility: HOSPITAL | Age: 51
End: 2021-08-21

## 2021-08-24 ENCOUNTER — TELEPHONE (OUTPATIENT)
Dept: ENDOSCOPY | Facility: HOSPITAL | Age: 51
End: 2021-08-24

## 2021-08-24 LAB
FINAL PATHOLOGIC DIAGNOSIS: NORMAL
Lab: NORMAL

## 2021-11-10 ENCOUNTER — OFFICE VISIT (OUTPATIENT)
Dept: GASTROENTEROLOGY | Facility: CLINIC | Age: 51
End: 2021-11-10
Payer: COMMERCIAL

## 2021-11-10 DIAGNOSIS — Z80.0 FAMILY HISTORY OF COLON CANCER IN FATHER: Primary | ICD-10-CM

## 2021-11-10 PROCEDURE — 99213 PR OFFICE/OUTPT VISIT, EST, LEVL III, 20-29 MIN: ICD-10-PCS | Mod: 95,,, | Performed by: INTERNAL MEDICINE

## 2021-11-10 PROCEDURE — 99213 OFFICE O/P EST LOW 20 MIN: CPT | Mod: 95,,, | Performed by: INTERNAL MEDICINE

## 2021-12-23 ENCOUNTER — LAB VISIT (OUTPATIENT)
Dept: PRIMARY CARE CLINIC | Facility: OTHER | Age: 51
End: 2021-12-23
Payer: COMMERCIAL

## 2021-12-23 DIAGNOSIS — Z20.822 ENCOUNTER FOR LABORATORY TESTING FOR COVID-19 VIRUS: ICD-10-CM

## 2021-12-23 PROCEDURE — U0003 INFECTIOUS AGENT DETECTION BY NUCLEIC ACID (DNA OR RNA); SEVERE ACUTE RESPIRATORY SYNDROME CORONAVIRUS 2 (SARS-COV-2) (CORONAVIRUS DISEASE [COVID-19]), AMPLIFIED PROBE TECHNIQUE, MAKING USE OF HIGH THROUGHPUT TECHNOLOGIES AS DESCRIBED BY CMS-2020-01-R: HCPCS | Performed by: INTERNAL MEDICINE

## 2021-12-24 LAB
SARS-COV-2 RNA RESP QL NAA+PROBE: NOT DETECTED
SARS-COV-2- CYCLE NUMBER: NORMAL

## 2021-12-27 ENCOUNTER — NURSE TRIAGE (OUTPATIENT)
Dept: ADMINISTRATIVE | Facility: CLINIC | Age: 51
End: 2021-12-27
Payer: COMMERCIAL

## 2021-12-27 ENCOUNTER — LAB VISIT (OUTPATIENT)
Dept: PRIMARY CARE CLINIC | Facility: OTHER | Age: 51
End: 2021-12-27
Attending: INTERNAL MEDICINE
Payer: COMMERCIAL

## 2021-12-27 DIAGNOSIS — R05.9 COUGH: ICD-10-CM

## 2021-12-27 PROCEDURE — U0003 INFECTIOUS AGENT DETECTION BY NUCLEIC ACID (DNA OR RNA); SEVERE ACUTE RESPIRATORY SYNDROME CORONAVIRUS 2 (SARS-COV-2) (CORONAVIRUS DISEASE [COVID-19]), AMPLIFIED PROBE TECHNIQUE, MAKING USE OF HIGH THROUGHPUT TECHNOLOGIES AS DESCRIBED BY CMS-2020-01-R: HCPCS | Performed by: INTERNAL MEDICINE

## 2021-12-29 ENCOUNTER — PATIENT MESSAGE (OUTPATIENT)
Dept: INTERNAL MEDICINE | Facility: CLINIC | Age: 51
End: 2021-12-29
Payer: COMMERCIAL

## 2021-12-29 LAB
SARS-COV-2 RNA RESP QL NAA+PROBE: NOT DETECTED
SARS-COV-2- CYCLE NUMBER: NORMAL

## 2022-07-04 DIAGNOSIS — F32.A DEPRESSION, UNSPECIFIED DEPRESSION TYPE: ICD-10-CM

## 2022-07-05 ENCOUNTER — TELEPHONE (OUTPATIENT)
Dept: INTERNAL MEDICINE | Facility: CLINIC | Age: 52
End: 2022-07-05

## 2022-07-05 RX ORDER — ESCITALOPRAM OXALATE 10 MG/1
TABLET ORAL
Qty: 90 TABLET | Refills: 0 | Status: SHIPPED | OUTPATIENT
Start: 2022-07-05 | End: 2022-07-18

## 2022-07-05 NOTE — TELEPHONE ENCOUNTER
----- Message from Kaylah Ribera sent at 7/5/2022  8:23 AM CDT -----  Contact: [phone]  Pt is calling she states she is needing this today she states she took her last pill night before last please advise     Requesting an RX refill or new RX.  Is this a refill or new RX: refill  RX name and strength (copy/paste from chart):  EScitalopram oxalate (LEXAPRO) 10 MG tablet  Is this a 30 day or 90 day RX: 30  Pharmacy name and phone # (copy/paste from chart):  [id number] - [place], [place] - [address]  [address] [address]  Phone: [phone] Fax: [phone]       The doctors have asked that we provide their patients with the following 2 reminders -- prescription refills can take up to 72 hours, and a friendly reminder that in the future you can use your [facility] account to request refills: yes

## 2022-08-08 ENCOUNTER — PATIENT MESSAGE (OUTPATIENT)
Dept: PHARMACY | Facility: CLINIC | Age: 52
End: 2022-08-08
Payer: COMMERCIAL

## 2022-08-08 ENCOUNTER — OFFICE VISIT (OUTPATIENT)
Dept: INTERNAL MEDICINE | Facility: CLINIC | Age: 52
End: 2022-08-08
Payer: COMMERCIAL

## 2022-08-08 VITALS
HEART RATE: 64 BPM | BODY MASS INDEX: 27.53 KG/M2 | DIASTOLIC BLOOD PRESSURE: 74 MMHG | HEIGHT: 69 IN | WEIGHT: 185.88 LBS | SYSTOLIC BLOOD PRESSURE: 118 MMHG | OXYGEN SATURATION: 97 %

## 2022-08-08 DIAGNOSIS — G56.01 CARPAL TUNNEL SYNDROME OF RIGHT WRIST: ICD-10-CM

## 2022-08-08 DIAGNOSIS — Z80.0 FAMILY HISTORY OF COLON CANCER: ICD-10-CM

## 2022-08-08 DIAGNOSIS — M47.819 SPONDYLOSIS WITHOUT MYELOPATHY: ICD-10-CM

## 2022-08-08 DIAGNOSIS — Z00.00 ANNUAL PHYSICAL EXAM: Primary | ICD-10-CM

## 2022-08-08 DIAGNOSIS — F41.9 ANXIETY: ICD-10-CM

## 2022-08-08 PROCEDURE — 99396 PR PREVENTIVE VISIT,EST,40-64: ICD-10-PCS | Mod: S$GLB,,, | Performed by: INTERNAL MEDICINE

## 2022-08-08 PROCEDURE — 99396 PREV VISIT EST AGE 40-64: CPT | Mod: S$GLB,,, | Performed by: INTERNAL MEDICINE

## 2022-08-08 PROCEDURE — 99999 PR PBB SHADOW E&M-EST. PATIENT-LVL III: ICD-10-PCS | Mod: PBBFAC,,, | Performed by: INTERNAL MEDICINE

## 2022-08-08 PROCEDURE — 99213 OFFICE O/P EST LOW 20 MIN: CPT | Mod: PBBFAC | Performed by: INTERNAL MEDICINE

## 2022-08-08 PROCEDURE — 99999 PR PBB SHADOW E&M-EST. PATIENT-LVL III: CPT | Mod: PBBFAC,,, | Performed by: INTERNAL MEDICINE

## 2022-08-08 NOTE — PATIENT INSTRUCTIONS
Noom wt loss program.    Taper Lexapro - half a tab daily for 2 weeks, then half a tab every other day x 2 weeks, then twice a week for a couple of weeks, then stop.

## 2022-08-08 NOTE — PROGRESS NOTES
Subjective:       Patient ID: Heather Scheuermann Hammond is a 52 y.o. female.    Chief Complaint: Annual Exam    HPI   C/o inability to lose wt.  EMotional eater.  Some marital stress which prompts eating.  She is having menopausal symptoms - sweating.  Random menses.    She is taking lexapro for anxiety.  She would like to stop once told it may be contributing to wt gain.      Lower back pain is stable.  R hand gets numb when driving, rarely when sleeping.    Warren syndrome was ruled out in Father.    She had normal cscope last  Year.  Review of Systems   Constitutional: Negative for fever and unexpected weight change.   HENT: Negative for nasal congestion and postnasal drip.    Respiratory: Negative for chest tightness, shortness of breath and wheezing.    Cardiovascular: Negative for chest pain and leg swelling.   Gastrointestinal: Negative for abdominal pain, anal bleeding, constipation, diarrhea, nausea and vomiting.   Genitourinary: Negative for dysuria and urgency.   Integumentary:  Negative for rash.   Neurological: Negative for headaches.   Psychiatric/Behavioral: Negative for dysphoric mood and sleep disturbance. The patient is not nervous/anxious.          Objective:      Physical Exam  Constitutional:       General: She is not in acute distress.     Appearance: She is well-developed.   HENT:      Head: Normocephalic and atraumatic.      Right Ear: External ear normal.      Left Ear: External ear normal.      Nose: Nose normal.   Eyes:      General: No scleral icterus.        Right eye: No discharge.         Left eye: No discharge.      Conjunctiva/sclera: Conjunctivae normal.      Pupils: Pupils are equal, round, and reactive to light.   Neck:      Thyroid: No thyromegaly.      Vascular: No JVD.   Cardiovascular:      Rate and Rhythm: Normal rate and regular rhythm.      Heart sounds: Normal heart sounds. No murmur heard.    No gallop.   Pulmonary:      Effort: Pulmonary effort is normal. No respiratory  distress.      Breath sounds: Normal breath sounds. No wheezing or rales.   Abdominal:      General: Bowel sounds are normal. There is no distension.      Palpations: Abdomen is soft. There is no mass.      Tenderness: There is no abdominal tenderness. There is no guarding or rebound.   Musculoskeletal:         General: No tenderness. Normal range of motion.      Cervical back: Normal range of motion and neck supple.   Lymphadenopathy:      Cervical: No cervical adenopathy.   Skin:     General: Skin is warm and dry.      Findings: No rash.   Neurological:      Mental Status: She is alert and oriented to person, place, and time.      Cranial Nerves: No cranial nerve deficit.      Coordination: Coordination normal.   Psychiatric:         Behavior: Behavior normal.         Thought Content: Thought content normal.         Judgment: Judgment normal.         Assessment:       Problem List Items Addressed This Visit     Spondylosis without myelopathy    Family history of colon cancer      Other Visit Diagnoses     Annual physical exam    -  Primary    Carpal tunnel syndrome of right wrist        BMI 27.0-27.9,adult        Anxiety              Plan:       Sana was seen today for annual exam.    Diagnoses and all orders for this visit:    Annual physical exam    Carpal tunnel syndrome of right wrist    Family history of colon cancer    BMI 27.0-27.9,adult    Spondylosis without myelopathy    Anxiety           Taper lexapro in an effort to lose wt.  REstart if anxiety recurs.    Marital therapy rec'd.  Daily exercise.  Covid vaccine next month  Shignrix

## 2022-09-14 DIAGNOSIS — Z12.31 OTHER SCREENING MAMMOGRAM: ICD-10-CM

## 2022-09-20 ENCOUNTER — PATIENT MESSAGE (OUTPATIENT)
Dept: ADMINISTRATIVE | Facility: HOSPITAL | Age: 52
End: 2022-09-20
Payer: COMMERCIAL

## 2022-10-03 ENCOUNTER — TELEPHONE (OUTPATIENT)
Dept: INTERNAL MEDICINE | Facility: CLINIC | Age: 52
End: 2022-10-03
Payer: COMMERCIAL

## 2022-10-03 NOTE — TELEPHONE ENCOUNTER
----- Message from Ayesha Hobbs sent at 10/3/2022 10:25 AM CDT -----  Name of Who is Calling:HAMMOND, HEATHER SCHEUERMANN [[id number]]              What is the request in detail:[facility]ing [facility].               Can the clinic reply by [facility]:no              What Number to Call Back if not in [facility]:[phone]

## 2022-11-13 ENCOUNTER — HOSPITAL ENCOUNTER (EMERGENCY)
Facility: HOSPITAL | Age: 52
Discharge: HOME | End: 2022-11-13
Attending: EMERGENCY MEDICINE
Payer: COMMERCIAL

## 2022-11-13 VITALS
WEIGHT: 158 LBS | RESPIRATION RATE: 18 BRPM | TEMPERATURE: 98 F | OXYGEN SATURATION: 99 % | HEIGHT: 68 IN | HEART RATE: 73 BPM | BODY MASS INDEX: 23.95 KG/M2 | SYSTOLIC BLOOD PRESSURE: 160 MMHG | DIASTOLIC BLOOD PRESSURE: 86 MMHG

## 2022-11-13 DIAGNOSIS — S05.01XA ABRASION OF RIGHT CORNEA, INITIAL ENCOUNTER: Primary | ICD-10-CM

## 2022-11-13 PROCEDURE — 99284 EMERGENCY DEPT VISIT MOD MDM: CPT

## 2022-11-13 PROCEDURE — 25000003 PHARM REV CODE 250: Performed by: EMERGENCY MEDICINE

## 2022-11-13 RX ORDER — TETRACAINE HYDROCHLORIDE 5 MG/ML
2 SOLUTION OPHTHALMIC
Status: DISCONTINUED | OUTPATIENT
Start: 2022-11-13 | End: 2022-11-13

## 2022-11-13 RX ORDER — TETRACAINE HYDROCHLORIDE 5 MG/ML
2 SOLUTION OPHTHALMIC
Status: COMPLETED | OUTPATIENT
Start: 2022-11-13 | End: 2022-11-13

## 2022-11-13 RX ORDER — HYDROCODONE BITARTRATE AND ACETAMINOPHEN 7.5; 325 MG/1; MG/1
1 TABLET ORAL EVERY 6 HOURS PRN
Qty: 15 TABLET | Refills: 0 | Status: SHIPPED | OUTPATIENT
Start: 2022-11-13 | End: 2023-01-04

## 2022-11-13 RX ORDER — MOXIFLOXACIN 5 MG/ML
1 SOLUTION/ DROPS OPHTHALMIC 3 TIMES DAILY
Qty: 3 ML | Refills: 0 | Status: SHIPPED | OUTPATIENT
Start: 2022-11-13 | End: 2022-11-20

## 2022-11-13 RX ORDER — HYDROCODONE BITARTRATE AND ACETAMINOPHEN 7.5; 325 MG/1; MG/1
1 TABLET ORAL ONCE
Status: COMPLETED | OUTPATIENT
Start: 2022-11-13 | End: 2022-11-13

## 2022-11-13 RX ADMIN — HYDROCODONE BITARTRATE AND ACETAMINOPHEN 1 TABLET: 7.5; 325 TABLET ORAL at 06:11

## 2022-11-13 RX ADMIN — TETRACAINE HYDROCHLORIDE 2 DROP: 5 SOLUTION OPHTHALMIC at 06:11

## 2022-11-13 RX ADMIN — FLUORESCEIN SODIUM 1 EACH: 1 STRIP OPHTHALMIC at 06:11

## 2022-11-13 NOTE — Clinical Note
"Sana"Lexie Marshall was seen and treated in our emergency department on 11/13/2022.  She may return to work on 11/17/2022.       If you have any questions or concerns, please don't hesitate to call.      Fracisco Rollins Jr., MD"

## 2022-11-14 NOTE — ED PROVIDER NOTES
Encounter Date: 11/13/2022       History     Chief Complaint   Patient presents with    Eye Problem     Accidentally poked finger in R eye last night. C/o pain to R eye.     Pt presented with right eye pain and photophobia after accidentally poking herself in the eye while sleeping last night. Pain worse today. Mildly blurred vision. No discharge.    The history is provided by the patient.   Eye Pain   This is a new problem. The current episode started yesterday. The problem has been gradually worsening. The right eye is affected. The injury mechanism was a direct trauma. The pain is at a severity of 7/10. There is No history of trauma to the eye. There is No known exposure to pink eye. She Does not wear contacts. Associated symptoms include photophobia and eye redness. Pertinent negatives include no discharge. She has tried nothing for the symptoms.   Review of patient's allergies indicates:   Allergen Reactions    Adhesive      tape     Past Medical History:   Diagnosis Date    Endometriosis      Past Surgical History:   Procedure Laterality Date    COLONOSCOPY N/A 7/8/2021    Surgeon: Rafael Leal MD    EXPLORATORY LAPAROTOMY  1988    OVARIAN CYST    PELVIC LAPAROSCOPY      X 2---INFERTILITY AND ENDOMETRIOSIS    PLEURA BIOPSY  07/2015     Family History   Problem Relation Age of Onset    Heart disease Father 60        MI    Colon cancer Father 72        dMMR of MSH6    Cancer Father         colon    Diabetes Maternal Uncle     Diabetes Maternal Grandmother     Arthritis Maternal Grandmother     Cancer Paternal Grandmother 75        Lymphoma    Lymphoma Paternal Grandmother 71    No Known Problems Sister     Breast cancer Paternal Aunt 71        unilat?    No Known Problems Sister     Ovarian cancer Neg Hx     Colon polyps Neg Hx     Celiac disease Neg Hx     Cirrhosis Neg Hx     Crohn's disease Neg Hx     Esophageal cancer Neg Hx     Inflammatory bowel disease Neg Hx     Liver cancer Neg Hx     Liver disease  Neg Hx     Rectal cancer Neg Hx     Stomach cancer Neg Hx     Ulcerative colitis Neg Hx     Pancreatic cancer Neg Hx     Kidney cancer Neg Hx     Bladder Cancer Neg Hx     Uterine cancer Neg Hx     Hemochromatosis Neg Hx     Haider's disease Neg Hx     Tuberculosis Neg Hx     Scleroderma Neg Hx     Multiple sclerosis Neg Hx     Melanoma Neg Hx     Lupus Neg Hx      Social History     Tobacco Use    Smoking status: Never    Smokeless tobacco: Never   Substance Use Topics    Alcohol use: Yes     Alcohol/week: 4.0 standard drinks     Types: 4 Glasses of wine per week     Comment: social    Drug use: No     Review of Systems   Eyes:  Positive for photophobia, pain, redness and visual disturbance. Negative for discharge and itching.     Physical Exam     Initial Vitals   BP Pulse Resp Temp SpO2   11/13/22 1749 11/13/22 1745 11/13/22 1745 11/13/22 1745 11/13/22 1745   (!) 160/86 73 20 98.4 °F (36.9 °C) 99 %      MAP       --                Physical Exam    Nursing note and vitals reviewed.  Constitutional: She appears well-developed and well-nourished. She is not diaphoretic.   Uncomfortable appearing   HENT:   Head: Normocephalic and atraumatic.   Right Ear: External ear normal.   Left Ear: External ear normal.   Nose: Nose normal.   Mouth/Throat: Oropharynx is clear and moist.   Eyes: EOM are normal. Pupils are equal, round, and reactive to light. Right eye exhibits no discharge. Left eye exhibits no discharge. No scleral icterus.   Mild injection. No hyphema. No obvious orbital injury. Small central abrasion present OD.    Neck: Neck supple. No thyromegaly present.   Normal range of motion.  Pulmonary/Chest: No respiratory distress.   Musculoskeletal:      Cervical back: Normal range of motion and neck supple.     Neurological: She is alert and oriented to person, place, and time. GCS score is 15. GCS eye subscore is 4. GCS verbal subscore is 5. GCS motor subscore is 6.   Skin: Skin is warm and dry.       ED Course    Procedures  Labs Reviewed - No data to display       Imaging Results    None          Medications   HYDROcodone-acetaminophen 7.5-325 mg per tablet 1 tablet (has no administration in time range)   fluorescein ophthalmic strip 1 each (1 each Right Eye Given 11/13/22 1810)   TETRAcaine HCl (PF) 0.5 % Drop 2 drop (2 drops Right Eye Given 11/13/22 1822)     Medical Decision Making:   Differential Diagnosis:   Hyphema, traumatic uveitis, corneal abrasion  ED Management:  Pt presented with right eye pain after direct blow with finger. Small abrasion on exam. L 20/30 R 20/50 B 20/30 Rx vigamox and ophtho f/u.                         Clinical Impression:   Final diagnoses:  [S05.01XA] Abrasion of right cornea, initial encounter (Primary)      ED Disposition Condition    Discharge Stable          ED Prescriptions       Medication Sig Dispense Start Date End Date Auth. Provider    moxifloxacin (VIGAMOX) 0.5 % ophthalmic solution Place 1 drop into the right eye 3 (three) times daily. for 7 days 3 mL 11/13/2022 11/20/2022 Fracisco Rollins Jr., MD    HYDROcodone-acetaminophen (NORCO) 7.5-325 mg per tablet Take 1 tablet by mouth every 6 (six) hours as needed for Pain. 15 tablet 11/13/2022 -- Fracisco Rollins Jr., MD          Follow-up Information       Follow up With Specialties Details Why Contact Info    ophthalmology clinic  In 3 days               Fracisco Rollins Jr., MD  11/13/22 5505

## 2022-11-29 ENCOUNTER — HOSPITAL ENCOUNTER (OUTPATIENT)
Dept: RADIOLOGY | Facility: HOSPITAL | Age: 52
Discharge: HOME | End: 2022-11-29
Attending: INTERNAL MEDICINE
Payer: COMMERCIAL

## 2022-11-29 DIAGNOSIS — Z12.31 OTHER SCREENING MAMMOGRAM: ICD-10-CM

## 2022-11-29 PROCEDURE — 77067 MAMMO DIGITAL SCREENING BILAT WITH TOMO: ICD-10-PCS | Mod: 26,,, | Performed by: RADIOLOGY

## 2022-11-29 PROCEDURE — 77063 MAMMO DIGITAL SCREENING BILAT WITH TOMO: ICD-10-PCS | Mod: 26,,, | Performed by: RADIOLOGY

## 2022-11-29 PROCEDURE — 77063 BREAST TOMOSYNTHESIS BI: CPT | Mod: 26,,, | Performed by: RADIOLOGY

## 2022-11-29 PROCEDURE — 77063 BREAST TOMOSYNTHESIS BI: CPT | Mod: TC

## 2022-11-29 PROCEDURE — 77067 SCR MAMMO BI INCL CAD: CPT | Mod: 26,,, | Performed by: RADIOLOGY

## 2023-01-04 ENCOUNTER — OFFICE VISIT (OUTPATIENT)
Dept: OBSTETRICS AND GYNECOLOGY | Facility: CLINIC | Age: 53
End: 2023-01-04
Payer: COMMERCIAL

## 2023-01-04 VITALS
DIASTOLIC BLOOD PRESSURE: 78 MMHG | HEIGHT: 68 IN | WEIGHT: 187.38 LBS | SYSTOLIC BLOOD PRESSURE: 118 MMHG | BODY MASS INDEX: 28.4 KG/M2

## 2023-01-04 DIAGNOSIS — Z01.419 ENCOUNTER FOR GYNECOLOGICAL EXAMINATION WITHOUT ABNORMAL FINDING: Primary | ICD-10-CM

## 2023-01-04 DIAGNOSIS — N95.1 MENOPAUSE SYNDROME: ICD-10-CM

## 2023-01-04 PROCEDURE — 3074F SYST BP LT 130 MM HG: CPT | Mod: CPTII,S$GLB,, | Performed by: OBSTETRICS & GYNECOLOGY

## 2023-01-04 PROCEDURE — 3008F BODY MASS INDEX DOCD: CPT | Mod: CPTII,S$GLB,, | Performed by: OBSTETRICS & GYNECOLOGY

## 2023-01-04 PROCEDURE — 99999 PR PBB SHADOW E&M-EST. PATIENT-LVL III: ICD-10-PCS | Mod: PBBFAC,,, | Performed by: OBSTETRICS & GYNECOLOGY

## 2023-01-04 PROCEDURE — 99396 PREV VISIT EST AGE 40-64: CPT | Mod: S$GLB,,, | Performed by: OBSTETRICS & GYNECOLOGY

## 2023-01-04 PROCEDURE — 3078F PR MOST RECENT DIASTOLIC BLOOD PRESSURE < 80 MM HG: ICD-10-PCS | Mod: CPTII,S$GLB,, | Performed by: OBSTETRICS & GYNECOLOGY

## 2023-01-04 PROCEDURE — 1159F PR MEDICATION LIST DOCUMENTED IN MEDICAL RECORD: ICD-10-PCS | Mod: CPTII,S$GLB,, | Performed by: OBSTETRICS & GYNECOLOGY

## 2023-01-04 PROCEDURE — 3074F PR MOST RECENT SYSTOLIC BLOOD PRESSURE < 130 MM HG: ICD-10-PCS | Mod: CPTII,S$GLB,, | Performed by: OBSTETRICS & GYNECOLOGY

## 2023-01-04 PROCEDURE — 1160F PR REVIEW ALL MEDS BY PRESCRIBER/CLIN PHARMACIST DOCUMENTED: ICD-10-PCS | Mod: CPTII,S$GLB,, | Performed by: OBSTETRICS & GYNECOLOGY

## 2023-01-04 PROCEDURE — 99396 PR PREVENTIVE VISIT,EST,40-64: ICD-10-PCS | Mod: S$GLB,,, | Performed by: OBSTETRICS & GYNECOLOGY

## 2023-01-04 PROCEDURE — 3008F PR BODY MASS INDEX (BMI) DOCUMENTED: ICD-10-PCS | Mod: CPTII,S$GLB,, | Performed by: OBSTETRICS & GYNECOLOGY

## 2023-01-04 PROCEDURE — 1160F RVW MEDS BY RX/DR IN RCRD: CPT | Mod: CPTII,S$GLB,, | Performed by: OBSTETRICS & GYNECOLOGY

## 2023-01-04 PROCEDURE — 3078F DIAST BP <80 MM HG: CPT | Mod: CPTII,S$GLB,, | Performed by: OBSTETRICS & GYNECOLOGY

## 2023-01-04 PROCEDURE — 1159F MED LIST DOCD IN RCRD: CPT | Mod: CPTII,S$GLB,, | Performed by: OBSTETRICS & GYNECOLOGY

## 2023-01-04 PROCEDURE — 99999 PR PBB SHADOW E&M-EST. PATIENT-LVL III: CPT | Mod: PBBFAC,,, | Performed by: OBSTETRICS & GYNECOLOGY

## 2023-01-04 RX ORDER — CONJUGATED ESTROGENS AND MEDROXYPROGESTERONE ACETATE .625; 2.5 MG/1; MG/1
1 TABLET, SUGAR COATED ORAL DAILY
Qty: 30 TABLET | Refills: 12 | Status: SHIPPED | OUTPATIENT
Start: 2023-01-04 | End: 2024-03-04

## 2023-01-04 NOTE — PROGRESS NOTES
PT HERE FOR ANNUAL.  CAN'T SAY THE LAST TIME SHE HAD A MENSES BUT FLASHES AND SWEATS ARE UNBEARABLE.  WEIGHT GAIN.    ROS:  GENERAL: No fever, chills, fatigability or weight loss.  VULVAR: No pain, no lesions and no itching.  VAGINAL: No relaxation, no itching, no discharge, no abnormal bleeding and no lesions.  ABDOMEN: No abdominal pain. Denies nausea. Denies vomiting. No diarrhea. No constipation  BREAST: Denies pain. No lumps. No discharge.  URINARY: No incontinence, no nocturia, no frequency and no dysuria.  CARDIOVASCULAR: No chest pain. No shortness of breath. No leg cramps.  NEUROLOGICAL: No headaches. No vision changes.  The remainder of the review of systems was negative.    PE:  General Appearance: overweight And Well developed. Well nourished. In no acute distress.  Vulva: Lesions: No.  Urethral Meatus: Normal size. Normal location. No lesions. No prolapse.  Urethra: No masses. No tenderness. No prolapse. No scarring.  Bladder: No masses. No tenderness.  Vagina: Mucosa NI:yes discharge no, atrophy yes, cystocele no or rectocele no.  Cervix: Lesion: no  Stenotic: no Cervical motion tenderness: no  Uterus: Uterus size: 5 weeks. Support good. Uterus size: Normal  Adnexa: Masses: No Tenderness: No CDS Nodularity: No  Abdomen: overweight No masses. No tenderness.  Breasts: No bilateral masses. No bilateral discharge. No bilateral tenderness. No bilateral fibrocystic changes.  Neck: No thyroid enlargement. No thyroid masses.  Skin: Rashes: No      PROCEDURES:    PLAN:     DIAGNOSIS:  1. Encounter for gynecological examination without abnormal finding    2. Menopause syndrome        MEDICATIONS & ORDERS:  Orders Placed This Encounter    estrogen, conjugated,-medroxyprogesterone 0.625-2.5mg (PREMPRO) 0.625-2.5 mg per tablet       Patient was counseled today on the new ACS guidelines for cervical cytology screening as well as the current recommendations for breast cancer screening. She was counseled to follow up  with her PCP for other routine health maintenance. Counseling session lasted approximately 10 minutes, and all her questions were answered.     Patient was counseled today on A.C.S. Pap guidelines and recommendations for yearly pelvic exams, mammograms and monthly self breast exams; to see her PCP for other health maintenance and the increased risks of CVD, MI, VTE, CVA , and Invasive Breast Cancer on Prempro and the increased risk of CVA with Premarin as reported by the W.H.I. studies; the benefits of HRT/ERT; her personal risks which include; alternative therapies for vasomotor symptoms (not FDA approved) including soy, black cohosh, Vit E and avoidance of triggers such as cigarette smoking, alcohol, humidity, stress and caffeine; alternative Rxs for treatment of menopause symptoms such as antidepressants or Clonidine. Counseling session lasted approximately minutes, and all her questions were completely answered.      FOLLOW-UP: With me in 12 month    Doni Hurd Jr, MD, FACOG

## 2023-01-25 ENCOUNTER — TELEPHONE (OUTPATIENT)
Dept: ORTHOPEDICS | Facility: CLINIC | Age: 53
End: 2023-01-25
Payer: COMMERCIAL

## 2023-01-25 ENCOUNTER — PATIENT MESSAGE (OUTPATIENT)
Dept: INTERNAL MEDICINE | Facility: CLINIC | Age: 53
End: 2023-01-25
Payer: COMMERCIAL

## 2023-01-25 NOTE — TELEPHONE ENCOUNTER
Called to schedule pt. I do not see a 9:15 slot available tomorrow. Pt needs appt before 10:15am. Can I schedule at 8:30am?
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
## 2023-01-25 NOTE — TELEPHONE ENCOUNTER
[facility] [place]  I have a meeting 8-9.   Can we put this pt in at 915 and move that audio visit to 245?  Thanks
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
## 2023-01-26 ENCOUNTER — OFFICE VISIT (OUTPATIENT)
Dept: INTERNAL MEDICINE | Facility: CLINIC | Age: 53
End: 2023-01-26
Payer: COMMERCIAL

## 2023-01-26 DIAGNOSIS — F43.23 ADJUSTMENT DISORDER WITH MIXED ANXIETY AND DEPRESSED MOOD: Primary | ICD-10-CM

## 2023-01-26 DIAGNOSIS — F32.A DEPRESSION, UNSPECIFIED DEPRESSION TYPE: ICD-10-CM

## 2023-01-26 PROCEDURE — 1159F MED LIST DOCD IN RCRD: CPT | Mod: CPTII,95,, | Performed by: INTERNAL MEDICINE

## 2023-01-26 PROCEDURE — 1159F PR MEDICATION LIST DOCUMENTED IN MEDICAL RECORD: ICD-10-PCS | Mod: CPTII,95,, | Performed by: INTERNAL MEDICINE

## 2023-01-26 PROCEDURE — 99212 OFFICE O/P EST SF 10 MIN: CPT | Mod: 95,,, | Performed by: INTERNAL MEDICINE

## 2023-01-26 PROCEDURE — 99212 PR OFFICE/OUTPT VISIT, EST, LEVL II, 10-19 MIN: ICD-10-PCS | Mod: 95,,, | Performed by: INTERNAL MEDICINE

## 2023-01-26 PROCEDURE — 1160F RVW MEDS BY RX/DR IN RCRD: CPT | Mod: CPTII,95,, | Performed by: INTERNAL MEDICINE

## 2023-01-26 PROCEDURE — 1160F PR REVIEW ALL MEDS BY PRESCRIBER/CLIN PHARMACIST DOCUMENTED: ICD-10-PCS | Mod: CPTII,95,, | Performed by: INTERNAL MEDICINE

## 2023-01-26 RX ORDER — ESCITALOPRAM OXALATE 10 MG/1
10 TABLET ORAL DAILY
Qty: 90 TABLET | Refills: 0 | Status: SHIPPED | OUTPATIENT
Start: 2023-01-26 | End: 2023-05-01 | Stop reason: SDUPTHER
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
## 2023-01-27 ENCOUNTER — TELEPHONE (OUTPATIENT)
Dept: SPORTS MEDICINE | Facility: CLINIC | Age: 53
End: 2023-01-27
Payer: COMMERCIAL
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
## 2023-01-31 ENCOUNTER — OFFICE VISIT (OUTPATIENT)
Dept: SPORTS MEDICINE | Facility: CLINIC | Age: 53
End: 2023-01-31
Payer: COMMERCIAL

## 2023-01-31 ENCOUNTER — HOSPITAL ENCOUNTER (OUTPATIENT)
Dept: RADIOLOGY | Facility: HOSPITAL | Age: 53
Discharge: HOME | End: 2023-01-31
Attending: ORTHOPAEDIC SURGERY
Payer: COMMERCIAL

## 2023-01-31 VITALS
DIASTOLIC BLOOD PRESSURE: 78 MMHG | SYSTOLIC BLOOD PRESSURE: 129 MMHG | HEIGHT: 68 IN | WEIGHT: 187 LBS | HEART RATE: 68 BPM | BODY MASS INDEX: 28.34 KG/M2

## 2023-01-31 DIAGNOSIS — M89.8X1 PAIN OF LEFT CLAVICLE: ICD-10-CM

## 2023-01-31 DIAGNOSIS — M89.8X1 PAIN OF LEFT CLAVICLE: Primary | ICD-10-CM

## 2023-01-31 PROCEDURE — 1159F MED LIST DOCD IN RCRD: CPT | Mod: CPTII,S$GLB,, | Performed by: ORTHOPAEDIC SURGERY

## 2023-01-31 PROCEDURE — 3074F SYST BP LT 130 MM HG: CPT | Mod: CPTII,S$GLB,, | Performed by: ORTHOPAEDIC SURGERY

## 2023-01-31 PROCEDURE — 73000 XR CLAVICLE LEFT: ICD-10-PCS | Mod: 26,LT,, | Performed by: RADIOLOGY

## 2023-01-31 PROCEDURE — 3074F PR MOST RECENT SYSTOLIC BLOOD PRESSURE < 130 MM HG: ICD-10-PCS | Mod: CPTII,S$GLB,, | Performed by: ORTHOPAEDIC SURGERY

## 2023-01-31 PROCEDURE — 73000 X-RAY EXAM OF COLLAR BONE: CPT | Mod: 26,LT,, | Performed by: RADIOLOGY

## 2023-01-31 PROCEDURE — 99999 PR PBB SHADOW E&M-EST. PATIENT-LVL III: CPT | Mod: PBBFAC,,, | Performed by: ORTHOPAEDIC SURGERY

## 2023-01-31 PROCEDURE — 1159F PR MEDICATION LIST DOCUMENTED IN MEDICAL RECORD: ICD-10-PCS | Mod: CPTII,S$GLB,, | Performed by: ORTHOPAEDIC SURGERY

## 2023-01-31 PROCEDURE — 99204 PR OFFICE/OUTPT VISIT, NEW, LEVL IV, 45-59 MIN: ICD-10-PCS | Mod: S$GLB,,, | Performed by: ORTHOPAEDIC SURGERY

## 2023-01-31 PROCEDURE — 99999 PR PBB SHADOW E&M-EST. PATIENT-LVL III: ICD-10-PCS | Mod: PBBFAC,,, | Performed by: ORTHOPAEDIC SURGERY

## 2023-01-31 PROCEDURE — 3078F PR MOST RECENT DIASTOLIC BLOOD PRESSURE < 80 MM HG: ICD-10-PCS | Mod: CPTII,S$GLB,, | Performed by: ORTHOPAEDIC SURGERY

## 2023-01-31 PROCEDURE — 99204 OFFICE O/P NEW MOD 45 MIN: CPT | Mod: S$GLB,,, | Performed by: ORTHOPAEDIC SURGERY

## 2023-01-31 PROCEDURE — 3008F PR BODY MASS INDEX (BMI) DOCUMENTED: ICD-10-PCS | Mod: CPTII,S$GLB,, | Performed by: ORTHOPAEDIC SURGERY

## 2023-01-31 PROCEDURE — 73000 X-RAY EXAM OF COLLAR BONE: CPT | Mod: TC,LT

## 2023-01-31 PROCEDURE — 3078F DIAST BP <80 MM HG: CPT | Mod: CPTII,S$GLB,, | Performed by: ORTHOPAEDIC SURGERY

## 2023-01-31 PROCEDURE — 3008F BODY MASS INDEX DOCD: CPT | Mod: CPTII,S$GLB,, | Performed by: ORTHOPAEDIC SURGERY
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
## 2023-02-02 ENCOUNTER — PATIENT MESSAGE (OUTPATIENT)
Dept: INTERNAL MEDICINE | Facility: CLINIC | Age: 53
End: 2023-02-02
Payer: COMMERCIAL

## 2023-05-01 ENCOUNTER — TELEPHONE (OUTPATIENT)
Dept: INTERNAL MEDICINE | Facility: CLINIC | Age: 53
End: 2023-05-01
Payer: COMMERCIAL

## 2023-05-01 DIAGNOSIS — F32.A DEPRESSION, UNSPECIFIED DEPRESSION TYPE: ICD-10-CM

## 2023-05-01 RX ORDER — ESCITALOPRAM OXALATE 10 MG/1
10 TABLET ORAL DAILY
Qty: 90 TABLET | Refills: 0 | Status: SHIPPED | OUTPATIENT
Start: 2023-05-01 | End: 2023-07-27 | Stop reason: SDUPTHER

## 2023-05-01 NOTE — TELEPHONE ENCOUNTER
----- Message from Zonia Mejía sent at 5/1/2023  9:42 AM CDT -----  Contact: [phone]  Requesting an RX refill or new RX.  Is this a refill or new RX: refill  RX name and strength (copy/paste from chart):  EScitalopram oxalate (LEXAPRO) 10 MG tablet  Is this a 30 day or 90 day RX: 90 day   Pharmacy name and phone # (copy/paste from chart):    [facility]/pharmacy #[id number] - [facility] [address]  [address] [address]  Phone: [phone] Fax: [phone]  The doctors have asked that we provide their patients with the following 2 reminders -- prescription refills can take up to 72 hours, and a friendly reminder that in the future you can use your [facility] account to request refills: aware     Pt is completely out of this medication

## 2023-05-01 NOTE — TELEPHONE ENCOUNTER
No care due was identified.  [facility] Embedded Care Due Messages. Reference number: [id number].   5/01/2023 10:00:51 AM CDT

## 2023-05-02 ENCOUNTER — TELEPHONE (OUTPATIENT)
Dept: INTERNAL MEDICINE | Facility: CLINIC | Age: 53
End: 2023-05-02
Payer: COMMERCIAL

## 2023-05-02 NOTE — TELEPHONE ENCOUNTER
Pls call-   I will give her a year's supply after she follows up with me - She was seen 1/26 and lexapro was prescribed.  I had requested a f/u 1 month later.  Once I can assess how she is doing I will give her a year's supply.    Checkout was calling her to schedule an appt - virtual or in person.

## 2023-05-02 NOTE — TELEPHONE ENCOUNTER
Spoke to patient and advised of need for appt for year refill for lexapro. Patient verbalized understanding.       Offered appt pt declined.

## 2023-05-02 NOTE — TELEPHONE ENCOUNTER
----- Message from Adilene Paul sent at 5/2/2023 10:30 AM CDT -----  Contact: [phone] Patient  Patient is returning a phone call.  Who left a message for the patient: Pt states she had 3 missed calls from the office this morning and pt did not know who was calling for her  Does patient know what this is regarding:  appt for her Rx. Pt states she does not understand why everytime she requests a refill on her EScitalopram oxalate (LEXAPRO) 10 MG tablet she needs an appt  Would you like a call back, or a response through your [facility] portal?:   either  Comments:

## 2023-07-27 ENCOUNTER — OFFICE VISIT (OUTPATIENT)
Dept: INTERNAL MEDICINE | Facility: CLINIC | Age: 53
End: 2023-07-27
Payer: COMMERCIAL

## 2023-07-27 DIAGNOSIS — F32.A DEPRESSION, UNSPECIFIED DEPRESSION TYPE: ICD-10-CM

## 2023-07-27 DIAGNOSIS — F43.23 ADJUSTMENT DISORDER WITH MIXED ANXIETY AND DEPRESSED MOOD: Primary | ICD-10-CM

## 2023-07-27 DIAGNOSIS — Z80.0 FAMILY HISTORY OF COLON CANCER: ICD-10-CM

## 2023-07-27 DIAGNOSIS — Z12.39 BREAST SCREENING: ICD-10-CM

## 2023-07-27 DIAGNOSIS — Z00.00 ANNUAL PHYSICAL EXAM: ICD-10-CM

## 2023-07-27 PROCEDURE — 99213 PR OFFICE/OUTPT VISIT, EST, LEVL III, 20-29 MIN: ICD-10-PCS | Mod: 95,,, | Performed by: INTERNAL MEDICINE

## 2023-07-27 PROCEDURE — 99213 OFFICE O/P EST LOW 20 MIN: CPT | Mod: 95,,, | Performed by: INTERNAL MEDICINE

## 2023-07-27 RX ORDER — ESCITALOPRAM OXALATE 10 MG/1
10 TABLET ORAL DAILY
Qty: 90 TABLET | Refills: 1 | Status: SHIPPED | OUTPATIENT
Start: 2023-07-27 | End: 2024-01-31 | Stop reason: SDUPTHER

## 2023-07-27 NOTE — PROGRESS NOTES
The patient location is: home  The chief complaint leading to consultation is: depression, anxiety f/u    Visit type: audiovisual    Face to Face time with patient: 10  13 minutes of total time spent on the encounter, which includes face to face time and non-face to face time preparing to see the patient (eg, review of tests), Obtaining and/or reviewing separately obtained history, Documenting clinical information in the electronic or other health record, Independently interpreting results (not separately reported) and communicating results to the patient/family/caregiver, or Care coordination (not separately reported).         Each patient to whom he or she provides medical services by telemedicine is:  (1) informed of the relationship between the physician and patient and the respective role of any other health care provider with respect to management of the patient; and (2) notified that he or she may decline to receive medical services by telemedicine and may withdraw from such care at any time.    Notes:  Subjective     Patient ID: Heather Scheuermann Hammond is a 53 y.o. female.    Chief Complaint: No chief complaint on file.    HPI  Mood better- anxiety and depression improved.  She would like to continue lexapro  Father's genetic testing for colon cancer was. negative  Due for MG  Review of Systems   Constitutional:  Negative for fever and unexpected weight change.   HENT:  Negative for nasal congestion and postnasal drip.    Respiratory:  Negative for chest tightness, shortness of breath and wheezing.    Cardiovascular:  Negative for chest pain and leg swelling.   Gastrointestinal:  Negative for abdominal pain, anal bleeding, constipation, diarrhea, nausea and vomiting.   Genitourinary:  Negative for dysuria and urgency.   Integumentary:  Negative for rash.   Neurological:  Negative for headaches.   Psychiatric/Behavioral:  Negative for dysphoric mood and sleep disturbance. The patient is not  nervous/anxious.         Objective     Physical Exam  Constitutional:       Appearance: She is not ill-appearing or diaphoretic.   Neurological:      General: No focal deficit present.      Mental Status: She is alert and oriented to person, place, and time.   Psychiatric:         Mood and Affect: Mood normal.         Behavior: Behavior normal.         Thought Content: Thought content normal.         Judgment: Judgment normal.          Assessment and Plan     1. Adjustment disorder with mixed anxiety and depressed mood    2. Depression, unspecified depression type  -     EScitalopram oxalate (LEXAPRO) 10 MG tablet; Take 1 tablet (10 mg total) by mouth once daily.  Dispense: 90 tablet; Refill: 1    3. Annual physical exam  -     Comprehensive Metabolic Panel; Future; Expected date: 07/27/2023  -     CBC Without Differential; Future; Expected date: 07/27/2023  -     Lipid Panel; Future; Expected date: 07/27/2023  -     Hemoglobin A1C; Future; Expected date: 07/27/2023    4. Breast screening  -     Mammo Digital Screening Bilat w/ Christopher; Future; Expected date: 07/27/2023    5. Family history of colon cancer    6. Spondylosis without myelopathy          Counseled on pap screening intervals.  I don't see HPV.    She would like to see Gyn soon.     She can presumable wait a bit for colon screening as last scope normal and father's genetic screening was negative.      Follow up in about 6 months (around 1/27/2024) for PE.

## 2023-09-08 ENCOUNTER — PATIENT MESSAGE (OUTPATIENT)
Dept: OBSTETRICS AND GYNECOLOGY | Facility: CLINIC | Age: 53
End: 2023-09-08
Payer: COMMERCIAL

## 2023-09-15 ENCOUNTER — TELEPHONE (OUTPATIENT)
Dept: OBSTETRICS AND GYNECOLOGY | Facility: CLINIC | Age: 53
End: 2023-09-15
Payer: COMMERCIAL

## 2023-09-15 NOTE — TELEPHONE ENCOUNTER
Pt called to scheduled hormone consult. Advised pt she will need to fill out the questionnaire that was sent to her on 9/8 by Benita and once she gets it back to her we can get her scheduled. Pt states will go fill it out

## 2023-09-15 NOTE — TELEPHONE ENCOUNTER
----- Message from Tacos Gee sent at 9/15/2023  9:04 AM CDT -----   Name of Who is Calling:     What is the request in detail:  patient request call back in reference to schedule appointment to  discuss hormones Please contact to further discuss and advise      Can the clinic reply by [facility]:     What Number to Call Back if not in [facility]:  [phone]

## 2024-01-04 ENCOUNTER — HOSPITAL ENCOUNTER (OUTPATIENT)
Dept: RADIOLOGY | Facility: OTHER | Age: 54
Discharge: HOME | End: 2024-01-04
Attending: INTERNAL MEDICINE
Payer: COMMERCIAL

## 2024-01-04 DIAGNOSIS — Z12.31 VISIT FOR SCREENING MAMMOGRAM: ICD-10-CM

## 2024-01-04 DIAGNOSIS — Z12.39 BREAST SCREENING: ICD-10-CM

## 2024-01-04 PROCEDURE — 77063 BREAST TOMOSYNTHESIS BI: CPT | Mod: 26,,, | Performed by: RADIOLOGY

## 2024-01-04 PROCEDURE — 77067 SCR MAMMO BI INCL CAD: CPT | Mod: TC

## 2024-01-04 PROCEDURE — 77067 SCR MAMMO BI INCL CAD: CPT | Mod: 26,,, | Performed by: RADIOLOGY

## 2024-01-08 ENCOUNTER — LAB VISIT (OUTPATIENT)
Dept: LAB | Facility: OTHER | Age: 54
End: 2024-01-08
Attending: OBSTETRICS & GYNECOLOGY
Payer: COMMERCIAL

## 2024-01-08 ENCOUNTER — OFFICE VISIT (OUTPATIENT)
Dept: OBSTETRICS AND GYNECOLOGY | Facility: CLINIC | Age: 54
End: 2024-01-08
Attending: OBSTETRICS & GYNECOLOGY
Payer: COMMERCIAL

## 2024-01-08 VITALS
DIASTOLIC BLOOD PRESSURE: 80 MMHG | WEIGHT: 192 LBS | SYSTOLIC BLOOD PRESSURE: 135 MMHG | HEART RATE: 68 BPM | HEIGHT: 68 IN | BODY MASS INDEX: 29.1 KG/M2

## 2024-01-08 DIAGNOSIS — N95.1 MENOPAUSAL SYMPTOM: ICD-10-CM

## 2024-01-08 DIAGNOSIS — Z12.4 SCREENING FOR MALIGNANT NEOPLASM OF THE CERVIX: Primary | ICD-10-CM

## 2024-01-08 DIAGNOSIS — Z00.00 ANNUAL PHYSICAL EXAM: ICD-10-CM

## 2024-01-08 DIAGNOSIS — Z01.419 ENCOUNTER FOR GYNECOLOGICAL EXAMINATION WITHOUT ABNORMAL FINDING: ICD-10-CM

## 2024-01-08 LAB
ALBUMIN SERPL BCP-MCNC: 4.4 G/DL (ref 3.5–5.2)
ALP SERPL-CCNC: 95 U/L (ref 55–135)
ALT SERPL W/O P-5'-P-CCNC: 88 U/L (ref 10–44)
ANION GAP SERPL CALC-SCNC: 10 MMOL/L (ref 8–16)
AST SERPL-CCNC: 40 U/L (ref 10–40)
BILIRUB SERPL-MCNC: 0.5 MG/DL (ref 0.1–1)
BUN SERPL-MCNC: 15 MG/DL (ref 6–20)
CALCIUM SERPL-MCNC: 9.8 MG/DL (ref 8.7–10.5)
CHLORIDE SERPL-SCNC: 101 MMOL/L (ref 95–110)
CHOLEST SERPL-MCNC: 224 MG/DL (ref 120–199)
CHOLEST/HDLC SERPL: 4.1 {RATIO} (ref 2–5)
CO2 SERPL-SCNC: 27 MMOL/L (ref 23–29)
CREAT SERPL-MCNC: 0.8 MG/DL (ref 0.5–1.4)
ERYTHROCYTE [DISTWIDTH] IN BLOOD BY AUTOMATED COUNT: 11.8 % (ref 11.5–14.5)
EST. GFR  (NO RACE VARIABLE): >60 ML/MIN/1.73 M^2
ESTIMATED AVG GLUCOSE: 114 MG/DL (ref 68–131)
ESTRADIOL SERPL-MCNC: <10 PG/ML
FSH SERPL-ACNC: 62.58 MIU/ML
GLUCOSE SERPL-MCNC: 107 MG/DL (ref 70–110)
HBA1C MFR BLD: 5.6 % (ref 4–5.6)
HCT VFR BLD AUTO: 41.7 % (ref 37–48.5)
HDLC SERPL-MCNC: 54 MG/DL (ref 40–75)
HDLC SERPL: 24.1 % (ref 20–50)
HGB BLD-MCNC: 14 G/DL (ref 12–16)
LDLC SERPL CALC-MCNC: 134.4 MG/DL (ref 63–159)
MCH RBC QN AUTO: 30.1 PG (ref 27–31)
MCHC RBC AUTO-ENTMCNC: 33.6 G/DL (ref 32–36)
MCV RBC AUTO: 90 FL (ref 82–98)
NONHDLC SERPL-MCNC: 170 MG/DL
PLATELET # BLD AUTO: 253 K/UL (ref 150–450)
PMV BLD AUTO: 9.5 FL (ref 9.2–12.9)
POTASSIUM SERPL-SCNC: 4.8 MMOL/L (ref 3.5–5.1)
PROT SERPL-MCNC: 8 G/DL (ref 6–8.4)
RBC # BLD AUTO: 4.65 M/UL (ref 4–5.4)
SODIUM SERPL-SCNC: 138 MMOL/L (ref 136–145)
TESTOST SERPL-MCNC: 31 NG/DL (ref 5–73)
TRIGL SERPL-MCNC: 178 MG/DL (ref 30–150)
WBC # BLD AUTO: 5.17 K/UL (ref 3.9–12.7)

## 2024-01-08 PROCEDURE — 1159F MED LIST DOCD IN RCRD: CPT | Mod: CPTII,S$GLB,, | Performed by: OBSTETRICS & GYNECOLOGY

## 2024-01-08 PROCEDURE — 83001 ASSAY OF GONADOTROPIN (FSH): CPT | Performed by: OBSTETRICS & GYNECOLOGY

## 2024-01-08 PROCEDURE — 80061 LIPID PANEL: CPT | Performed by: INTERNAL MEDICINE

## 2024-01-08 PROCEDURE — 99396 PREV VISIT EST AGE 40-64: CPT | Mod: S$GLB,,, | Performed by: OBSTETRICS & GYNECOLOGY

## 2024-01-08 PROCEDURE — 88175 CYTOPATH C/V AUTO FLUID REDO: CPT | Performed by: OBSTETRICS & GYNECOLOGY

## 2024-01-08 PROCEDURE — 84402 ASSAY OF FREE TESTOSTERONE: CPT | Performed by: OBSTETRICS & GYNECOLOGY

## 2024-01-08 PROCEDURE — 84403 ASSAY OF TOTAL TESTOSTERONE: CPT | Performed by: OBSTETRICS & GYNECOLOGY

## 2024-01-08 PROCEDURE — 3008F BODY MASS INDEX DOCD: CPT | Mod: CPTII,S$GLB,, | Performed by: OBSTETRICS & GYNECOLOGY

## 2024-01-08 PROCEDURE — 80053 COMPREHEN METABOLIC PANEL: CPT | Performed by: INTERNAL MEDICINE

## 2024-01-08 PROCEDURE — 82670 ASSAY OF TOTAL ESTRADIOL: CPT | Performed by: OBSTETRICS & GYNECOLOGY

## 2024-01-08 PROCEDURE — 83036 HEMOGLOBIN GLYCOSYLATED A1C: CPT | Performed by: INTERNAL MEDICINE

## 2024-01-08 PROCEDURE — 3075F SYST BP GE 130 - 139MM HG: CPT | Mod: CPTII,S$GLB,, | Performed by: OBSTETRICS & GYNECOLOGY

## 2024-01-08 PROCEDURE — 3079F DIAST BP 80-89 MM HG: CPT | Mod: CPTII,S$GLB,, | Performed by: OBSTETRICS & GYNECOLOGY

## 2024-01-08 PROCEDURE — 99999 PR PBB SHADOW E&M-EST. PATIENT-LVL III: CPT | Mod: PBBFAC,,, | Performed by: OBSTETRICS & GYNECOLOGY

## 2024-01-08 PROCEDURE — 85027 COMPLETE CBC AUTOMATED: CPT | Performed by: INTERNAL MEDICINE

## 2024-01-08 PROCEDURE — 87624 HPV HI-RISK TYP POOLED RSLT: CPT | Performed by: OBSTETRICS & GYNECOLOGY

## 2024-01-08 NOTE — PROGRESS NOTES
SUBJECTIVE:   53 y.o. female   for annual routine Pap and checkup. No LMP recorded. (Menstrual status: Other)..  She reports hot flashes and night sweats.  She also has low libido.  She denies vaginal dryness.  She was prescribed Prempro in the past but never took this.  She is on Lexapro 10 mg daily for anxiety.  She sees Dr. Painter for PCP.        Past Medical History:   Diagnosis Date    Endometriosis      Past Surgical History:   Procedure Laterality Date    COLONOSCOPY N/A 2021    Surgeon: Rafael Leal MD    EXPLORATORY LAPAROTOMY      OVARIAN CYST    PELVIC LAPAROSCOPY      X 2---INFERTILITY AND ENDOMETRIOSIS    PLEURA BIOPSY  2015     Social History     Socioeconomic History    Marital status:    Occupational History    Not on file   Tobacco Use    Smoking status: Never    Smokeless tobacco: Never   Substance and Sexual Activity    Alcohol use: Yes     Alcohol/week: 4.0 standard drinks of alcohol     Types: 4 Glasses of wine per week     Comment: social    Drug use: No    Sexual activity: Yes     Partners: Male     Birth control/protection: None     Comment:    Social History Narrative    Was  for [employer].   at [employer].        Exercise - twice a week .    Walks dogs.     Social Determinants of Health     Financial Resource Strain: Low Risk  (2023)    Overall Financial Resource Strain (CARDIA)     Difficulty of Paying Living Expenses: Not very hard   Food Insecurity: No Food Insecurity (2023)    Hunger Vital Sign     Worried About Running Out of Food in the Last Year: Never true     Ran Out of Food in the Last Year: Never true   Transportation Needs: No Transportation Needs (2023)    PRAPARE - Transportation     Lack of Transportation (Medical): No     Lack of Transportation (Non-Medical): No   Physical Activity: Unknown (2023)    Exercise Vital Sign     Days of Exercise per Week: 2 days   Stress: Stress Concern  Present (2023)    [employer] [employer] - Occupational Stress Questionnaire     Feeling of Stress : To some extent   Social Connections: Unknown (2023)    Social Connection and Isolation Panel [NHANES]     Frequency of Communication with Friends and Family: Patient declined     Frequency of Social Gatherings with Friends and Family: Patient declined     Active Member of Clubs or Organizations: Yes     Attends Club or Organization Meetings: Patient declined     Marital Status:    Housing Stability: Unknown (2023)    Housing Stability Vital Sign     Unable to Pay for Housing in the Last Year: No     Unstable Housing in the Last Year: No     Family History   Problem Relation Age of Onset    Cancer Paternal Grandmother 75        Lymphoma    Lymphoma Paternal Grandmother 71    Diabetes Maternal Grandmother     Arthritis Maternal Grandmother     Heart disease Father 60        MI    Colon cancer Father 72        dMMR of MSH6    Cancer Father         colon    No Known Problems Sister     No Known Problems Sister     Diabetes Maternal Uncle     Breast cancer Paternal Aunt 71        unilat?    Ovarian cancer Neg Hx     Colon polyps Neg Hx     Celiac disease Neg Hx     Cirrhosis Neg Hx     Crohn's disease Neg Hx     Esophageal cancer Neg Hx     Inflammatory bowel disease Neg Hx     Liver cancer Neg Hx     Liver disease Neg Hx     Rectal cancer Neg Hx     Stomach cancer Neg Hx     Ulcerative colitis Neg Hx     Pancreatic cancer Neg Hx     Kidney cancer Neg Hx     Bladder Cancer Neg Hx     Uterine cancer Neg Hx     Hemochromatosis Neg Hx     Haider's disease Neg Hx     Tuberculosis Neg Hx     Scleroderma Neg Hx     Multiple sclerosis Neg Hx     Melanoma Neg Hx     Lupus Neg Hx     Cervical cancer Neg Hx      OB History    Para Term  AB Living   1   0   1     SAB IAB Ectopic Multiple Live Births           1      # Outcome Date GA Lbr Justino/2nd Weight Sex Delivery Anes PTL Lv    1 AB         DEC           Current Outpatient Medications   Medication Sig Dispense Refill    EScitalopram oxalate (LEXAPRO) 10 MG tablet Take 1 tablet (10 mg total) by mouth once daily. 90 tablet 1    ibuprofen (ADVIL,MOTRIN) 100 MG tablet Take 100 mg by mouth every 6 (six) hours as needed for Temperature greater than.      estrogen, conjugated,-medroxyprogesterone 0.625-2.5mg (PREMPRO) 0.625-2.5 mg per tablet Take 1 tablet by mouth once daily. 30 tablet 12     No current facility-administered medications for this visit.     Allergies: Adhesive     The 10-year ASCVD risk score (Yris FRANCO, et al., 2019) is: 1.7%    Values used to calculate the score:      Age: 53 years      Sex: Female      Is Non- : No      Diabetic: No      Tobacco smoker: No      Systolic Blood Pressure: 135 mmHg      Is BP treated: No      HDL Cholesterol: 51 mg/dL      Total Cholesterol: 179 mg/dL      ROS:  Constitutional: no weight loss, weight gain, fever, fatigue  Eyes:  No vision changes, glasses/contacts  ENT/Mouth: No ulcers, sinus problems, ears ringing, headache  Cardiovascular: No inability to lie flat, chest pain, exercise intolerance, swelling, heart palpitations  Respiratory: No wheezing, coughing blood, shortness of breath, or cough  Gastrointestinal: No diarrhea, bloody stool, nausea/vomiting, constipation, gas, hemorrhoids  Genitourinary: No blood in urine, painful urination, urgency of urination, frequency of urination, incomplete emptying, incontinence, abnormal bleeding, painful periods, heavy periods, vaginal discharge, vaginal odor, painful intercourse, sexual problems, bleeding after intercourse.  Musculoskeletal: No muscle weakness  Skin/Breast: No painful breasts, nipple discharge, masses, rash, ulcers  Neurological: No passing out, seizures, numbness, headache  Endocrine: No diabetes, hypothyroid, hyperthyroid, +hot flashes, hair loss, abnormal hair growth, acne  Psychiatric: No depression,  crying  Hematologic: No bruises, bleeding, swollen lymph nodes, anemia.      Physical Exam:   Constitutional: She is oriented to person, place, and time. She appears well-developed and well-nourished.      Neck: No tracheal deviation present. No thyromegaly present.    Cardiovascular:       Exam reveals no edema.        Pulmonary/Chest: Effort normal. She exhibits no mass, no tenderness, no deformity and no retraction. Right breast exhibits no inverted nipple, no mass, no nipple discharge, no skin change, no tenderness, presence, no bleeding and no swelling. Left breast exhibits no inverted nipple, no mass, no nipple discharge, no skin change, no tenderness, presence, no bleeding and no swelling. Breasts are symmetrical.        Abdominal: Soft. She exhibits no distension and no mass. There is no abdominal tenderness. There is no rebound and no guarding. No hernia. Hernia confirmed negative in the left inguinal area.     Genitourinary:    Vagina and uterus normal.   Rectum:      No external hemorrhoid.   There is no rash, tenderness or lesion on the right labia. There is no rash, tenderness or lesion on the left labia. Cervix is normal. No no adexnal prolapse. Right adnexum displays no mass, no tenderness and no fullness. Left adnexum displays no mass, no tenderness and no fullness. No vaginal discharge, tenderness, bleeding, rectocele, cystocele or prolapse of vaginal walls in the vagina. Cervix exhibits no motion tenderness, no discharge and no friability. Uterus is not deviated.           Musculoskeletal: Normal range of motion and moves all extremeties. No edema.       Neurological: She is alert and oriented to person, place, and time.    Skin: No rash noted. No erythema. No pallor.    Psychiatric: She has a normal mood and affect. Her behavior is normal. Judgment and thought content normal.         ASSESSMENT:   well woman  Menopausal symptoms  PLAN:   mammogram  pap smear  Labs today -FSH, estradiol, free  testosterone total testosterone and will link Dr. Painter's wellness labs were ordered but not scheduled  Return for hormone consult

## 2024-01-10 LAB — TESTOST FREE SERPL-MCNC: 0.6 PG/ML

## 2024-01-11 ENCOUNTER — PATIENT MESSAGE (OUTPATIENT)
Dept: INTERNAL MEDICINE | Facility: CLINIC | Age: 54
End: 2024-01-11
Payer: COMMERCIAL

## 2024-01-11 DIAGNOSIS — R74.01 ELEVATED ALT MEASUREMENT: Primary | ICD-10-CM

## 2024-01-22 ENCOUNTER — TELEPHONE (OUTPATIENT)
Dept: INTERNAL MEDICINE | Facility: CLINIC | Age: 54
End: 2024-01-22
Payer: COMMERCIAL

## 2024-01-22 NOTE — TELEPHONE ENCOUNTER
----- Message from Lisa Painter MD sent at 1/9/2024  6:49 PM CST -----  Was to have PE - pls schedule

## 2024-01-31 DIAGNOSIS — F32.A DEPRESSION, UNSPECIFIED DEPRESSION TYPE: ICD-10-CM

## 2024-02-01 RX ORDER — ESCITALOPRAM OXALATE 10 MG/1
10 TABLET ORAL DAILY
Qty: 90 TABLET | Refills: 1 | Status: SHIPPED | OUTPATIENT
Start: 2024-02-01 | End: 2024-04-08 | Stop reason: SDUPTHER

## 2024-02-01 NOTE — TELEPHONE ENCOUNTER
No care due was identified.  [facility] Embedded Care Due Messages. Reference number: [id number].   1/31/2024 9:23:44 PM CST

## 2024-03-04 ENCOUNTER — TELEPHONE (OUTPATIENT)
Dept: OBSTETRICS AND GYNECOLOGY | Facility: CLINIC | Age: 54
End: 2024-03-04

## 2024-03-04 ENCOUNTER — LAB VISIT (OUTPATIENT)
Dept: LAB | Facility: OTHER | Age: 54
End: 2024-03-04
Attending: NURSE PRACTITIONER
Payer: COMMERCIAL

## 2024-03-04 ENCOUNTER — TELEPHONE (OUTPATIENT)
Dept: ORTHOPEDICS | Facility: CLINIC | Age: 54
End: 2024-03-04
Payer: COMMERCIAL

## 2024-03-04 ENCOUNTER — OFFICE VISIT (OUTPATIENT)
Dept: OBSTETRICS AND GYNECOLOGY | Facility: CLINIC | Age: 54
End: 2024-03-04
Payer: COMMERCIAL

## 2024-03-04 VITALS
HEART RATE: 71 BPM | DIASTOLIC BLOOD PRESSURE: 77 MMHG | SYSTOLIC BLOOD PRESSURE: 127 MMHG | WEIGHT: 192 LBS | BODY MASS INDEX: 29.1 KG/M2 | HEIGHT: 68 IN

## 2024-03-04 DIAGNOSIS — R74.01 ELEVATED ALT MEASUREMENT: ICD-10-CM

## 2024-03-04 DIAGNOSIS — N95.1 MENOPAUSAL SYMPTOMS: Primary | ICD-10-CM

## 2024-03-04 DIAGNOSIS — R06.83 SNORING: ICD-10-CM

## 2024-03-04 DIAGNOSIS — N95.1 MENOPAUSAL SYMPTOMS: ICD-10-CM

## 2024-03-04 DIAGNOSIS — R68.82 LOW LIBIDO: ICD-10-CM

## 2024-03-04 LAB
ALBUMIN SERPL BCP-MCNC: 4.5 G/DL (ref 3.5–5.2)
ALP SERPL-CCNC: 69 U/L (ref 55–135)
ALT SERPL W/O P-5'-P-CCNC: 19 U/L (ref 10–44)
AST SERPL-CCNC: 15 U/L (ref 10–40)
BILIRUB DIRECT SERPL-MCNC: 0.2 MG/DL (ref 0.1–0.3)
BILIRUB SERPL-MCNC: 0.5 MG/DL (ref 0.1–1)
PROT SERPL-MCNC: 7.7 G/DL (ref 6–8.4)

## 2024-03-04 PROCEDURE — 36415 COLL VENOUS BLD VENIPUNCTURE: CPT | Performed by: NURSE PRACTITIONER

## 2024-03-04 PROCEDURE — 1160F RVW MEDS BY RX/DR IN RCRD: CPT | Mod: CPTII,S$GLB,, | Performed by: NURSE PRACTITIONER

## 2024-03-04 PROCEDURE — 3074F SYST BP LT 130 MM HG: CPT | Mod: CPTII,S$GLB,, | Performed by: NURSE PRACTITIONER

## 2024-03-04 PROCEDURE — 3008F BODY MASS INDEX DOCD: CPT | Mod: CPTII,S$GLB,, | Performed by: NURSE PRACTITIONER

## 2024-03-04 PROCEDURE — 1159F MED LIST DOCD IN RCRD: CPT | Mod: CPTII,S$GLB,, | Performed by: NURSE PRACTITIONER

## 2024-03-04 PROCEDURE — 3078F DIAST BP <80 MM HG: CPT | Mod: CPTII,S$GLB,, | Performed by: NURSE PRACTITIONER

## 2024-03-04 PROCEDURE — 3044F HG A1C LEVEL LT 7.0%: CPT | Mod: CPTII,S$GLB,, | Performed by: NURSE PRACTITIONER

## 2024-03-04 PROCEDURE — 99214 OFFICE O/P EST MOD 30 MIN: CPT | Mod: S$GLB,,, | Performed by: NURSE PRACTITIONER

## 2024-03-04 PROCEDURE — 99999 PR PBB SHADOW E&M-EST. PATIENT-LVL III: CPT | Mod: PBBFAC,,, | Performed by: NURSE PRACTITIONER

## 2024-03-04 PROCEDURE — 80076 HEPATIC FUNCTION PANEL: CPT | Performed by: NURSE PRACTITIONER

## 2024-03-04 RX ORDER — ESTRADIOL 0.1 MG/D
1 FILM, EXTENDED RELEASE TRANSDERMAL
Qty: 8 PATCH | Refills: 11 | Status: SHIPPED | OUTPATIENT
Start: 2024-03-04 | End: 2024-03-04

## 2024-03-04 RX ORDER — PROGESTERONE 200 MG/1
200 CAPSULE ORAL NIGHTLY
Qty: 30 CAPSULE | Refills: 11 | Status: SHIPPED | OUTPATIENT
Start: 2024-03-04 | End: 2024-03-04

## 2024-03-04 RX ORDER — PROGESTERONE 200 MG/1
200 CAPSULE ORAL NIGHTLY
Qty: 30 CAPSULE | Refills: 11 | Status: SHIPPED | OUTPATIENT
Start: 2024-03-04 | End: 2025-03-04

## 2024-03-04 RX ORDER — ESTRADIOL 0.1 MG/D
1 FILM, EXTENDED RELEASE TRANSDERMAL
Qty: 8 PATCH | Refills: 11 | Status: SHIPPED | OUTPATIENT
Start: 2024-03-04 | End: 2024-04-08

## 2024-03-04 NOTE — TELEPHONE ENCOUNTER
Contact with patient, ALT level has returned to normal range. HRT scripts to pharmacy. HRT FU in 3 months.

## 2024-03-04 NOTE — PROGRESS NOTES
Subjective:      Heather Scheuermann Hammond is a 54 y.o. female who presents to discuss hormone replacement therapy. Absence of menses for >12 months. Patient is requesting hormone replacement therapy due to hot flashes, insomnia, moodiness, no energy, and vaginal dryness, decreased libido, memory loss, night sweats.The patient is not taking hormone replacement therapy. Patient denies post-menopausal vaginal bleeding. The patient is sexually active.  She denies the following contraindications to HRT:  Vaginal bleeding, history of VTE/PE, thrombophilia,  breast cancer. Re    Recent elevation of ALT with annual lab work in January, reports more than 7-8 glasses of wine per week at that time. She has now reduced alcohol consumption significantly. Scheduled for HFT in April.     Snoring with sleep, shallow sleep quality.       Hemoglobin A1C   Date Value Ref Range Status   01/08/2024 5.6 4.0 - 5.6 % Final     Comment:     ADA Screening Guidelines:  5.7-6.4%  Consistent with prediabetes  >or=6.5%  Consistent with diabetes    High levels of fetal hemoglobin interfere with the HbA1C  assay. Heterozygous hemoglobin variants (HbS, HgC, etc)do  not significantly interfere with this assay.   However, presence of multiple variants may affect accuracy.     12/09/2014 5.0 4.5 - 6.2 % Final           Pap smear: 8/24/2021  Mammogram: 2024    Lab Visit on 01/08/2024   Component Date Value Ref Range Status    Sodium 01/08/2024 138  136 - 145 mmol/L Final    Potassium 01/08/2024 4.8  3.5 - 5.1 mmol/L Final    Chloride 01/08/2024 101  95 - 110 mmol/L Final    CO2 01/08/2024 27  23 - 29 mmol/L Final    Glucose 01/08/2024 107  70 - 110 mg/dL Final    BUN 01/08/2024 15  6 - 20 mg/dL Final    Creatinine 01/08/2024 0.8  0.5 - 1.4 mg/dL Final    Calcium 01/08/2024 9.8  8.7 - 10.5 mg/dL Final    Total Protein 01/08/2024 8.0  6.0 - 8.4 g/dL Final    Albumin 01/08/2024 4.4  3.5 - 5.2 g/dL Final    Total Bilirubin 01/08/2024 0.5  0.1 - 1.0  mg/dL Final    Alkaline Phosphatase 01/08/2024 95  55 - 135 U/L Final    AST 01/08/2024 40  10 - 40 U/L Final    ALT 01/08/2024 88 (H)  10 - 44 U/L Final    eGFR 01/08/2024 >60  >60 mL/min/1.73 m^2 Final    Anion Gap 01/08/2024 10  8 - 16 mmol/L Final    WBC 01/08/2024 5.17  3.90 - 12.70 K/uL Final    RBC 01/08/2024 4.65  4.00 - 5.40 M/uL Final    Hemoglobin 01/08/2024 14.0  12.0 - 16.0 g/dL Final    Hematocrit 01/08/2024 41.7  37.0 - 48.5 % Final    MCV 01/08/2024 90  82 - 98 fL Final    MCH 01/08/2024 30.1  27.0 - 31.0 pg Final    MCHC 01/08/2024 33.6  32.0 - 36.0 g/dL Final    RDW 01/08/2024 11.8  11.5 - 14.5 % Final    Platelets 01/08/2024 253  150 - 450 K/uL Final    MPV 01/08/2024 9.5  9.2 - 12.9 fL Final    Cholesterol 01/08/2024 224 (H)  120 - 199 mg/dL Final    Triglycerides 01/08/2024 178 (H)  30 - 150 mg/dL Final    HDL 01/08/2024 54  40 - 75 mg/dL Final    LDL Cholesterol 01/08/2024 134.4  63.0 - 159.0 mg/dL Final    HDL/Cholesterol Ratio 01/08/2024 24.1  20.0 - 50.0 % Final    Total Cholesterol/HDL Ratio 01/08/2024 4.1  2.0 - 5.0 Final    Non-HDL Cholesterol 01/08/2024 170  mg/dL Final    Hemoglobin A1C 01/08/2024 5.6  4.0 - 5.6 % Final    Estimated Avg Glucose 01/08/2024 114  68 - 131 mg/dL Final    Follicle Stimulating Hormone 01/08/2024 62.58  See Text mIU/mL Final    Estradiol 01/08/2024 <10 (A)  See Text pg/mL Final    Testosterone, Free 01/08/2024 0.6  pg/mL Final    Testosterone, Total 01/08/2024 31  5 - 73 ng/dL Final   Office Visit on 01/08/2024   Component Date Value Ref Range Status    HPV DNA High Risk 01/08/2024 Not Detected  NOT DETECTED Final    Cytology ThinPrep Pap Source 01/08/2024 Cervix   Final    Cytology ThinPrep Pap Report Status 01/08/2024 DNR   Final    Cytology Thinprep PAP Clinical His* 01/08/2024 Routine exam   Corrected    Cytology ThinPrep Pap LMP 01/08/2024 none   Final    Cytology ThinPrep Previous PAP 01/08/2024 Unknown   Final    Cytology ThinPrep Previous Biopsy  01/08/2024 No   Final    Cytology ThinPrep PAP Adequacy 01/08/2024 SEE BELOW   Final    Cytology ThinPrep PAP General Ramona* 01/08/2024 DNR   Final    Cytology ThinPrep PAP Interpretati* 01/08/2024 SEE BELOW   Final    Cytology ThinPrep PAP Comment 01/08/2024 SEE BELOW   Final    Cytotechnologist 01/08/2024 SEE BELOW   Final    Review Cytotechnologist 01/08/2024 DNR   Final    Pathologist 01/08/2024 DNR   Final    Cytology ThinPrep PAP Infection 01/08/2024 DNR   Final    Cytology Thin Prep Pap Explanation 01/08/2024 SEE BELOW   Final       Past Medical History:   Diagnosis Date    Endometriosis      Past Surgical History:   Procedure Laterality Date    COLONOSCOPY N/A 7/8/2021    Surgeon: Rafael Leal MD    EXPLORATORY LAPAROTOMY  1988    OVARIAN CYST    PELVIC LAPAROSCOPY      X 2---INFERTILITY AND ENDOMETRIOSIS    PLEURA BIOPSY  07/2015     Social History     Tobacco Use    Smoking status: Never    Smokeless tobacco: Never   Substance Use Topics    Alcohol use: Yes     Alcohol/week: 4.0 standard drinks of alcohol     Types: 4 Glasses of wine per week     Comment: social    Drug use: No     Family History   Problem Relation Age of Onset    Cancer Paternal Grandmother 75        Lymphoma    Lymphoma Paternal Grandmother 71    Diabetes Maternal Grandmother     Arthritis Maternal Grandmother     Heart disease Father 60        MI    Colon cancer Father 72        dMMR of MSH6    Cancer Father         colon    No Known Problems Sister     No Known Problems Sister     Diabetes Maternal Uncle     Breast cancer Paternal Aunt 71        unilat?    Ovarian cancer Neg Hx     Colon polyps Neg Hx     Celiac disease Neg Hx     Cirrhosis Neg Hx     Crohn's disease Neg Hx     Esophageal cancer Neg Hx     Inflammatory bowel disease Neg Hx     Liver cancer Neg Hx     Liver disease Neg Hx     Rectal cancer Neg Hx     Stomach cancer Neg Hx     Ulcerative colitis Neg Hx     Pancreatic cancer Neg Hx     Kidney cancer Neg Hx     Bladder  "Cancer Neg Hx     Uterine cancer Neg Hx     Hemochromatosis Neg Hx     Haider's disease Neg Hx     Tuberculosis Neg Hx     Scleroderma Neg Hx     Multiple sclerosis Neg Hx     Melanoma Neg Hx     Lupus Neg Hx     Cervical cancer Neg Hx      OB History    Para Term  AB Living   1   0   1     SAB IAB Ectopic Multiple Live Births           1      # Outcome Date GA Lbr Justino/2nd Weight Sex Delivery Anes PTL Lv   1 AB         DEC       Current Outpatient Medications:     EScitalopram oxalate (LEXAPRO) 10 MG tablet, Take 1 tablet (10 mg total) by mouth once daily., Disp: 90 tablet, Rfl: 1    ibuprofen (ADVIL,MOTRIN) 100 MG tablet, Take 100 mg by mouth every 6 (six) hours as needed for Temperature greater than., Disp: , Rfl:     Vitals:    24 0918   BP: 127/77   Pulse: 71   Weight: 87.1 kg (192 lb)   Height: 5' 8" (1.727 m)   PainSc: 0-No pain     Body mass index is 29.19 kg/m².    Assessment:    Menopausal symptoms  -     Discontinue: estradioL (VIVELLE-DOT) 0.1 mg/24 hr PTSW; Place 1 patch onto the skin twice a week.  Dispense: 8 patch; Refill: 11  -     Discontinue: progesterone (PROMETRIUM) 200 MG capsule; Take 1 capsule (200 mg total) by mouth nightly.  Dispense: 30 capsule; Refill: 11    Snoring  -     Ambulatory referral/consult to Sleep Disorders; Future; Expected date: 2024    Elevated ALT measurement  -     Hepatic Function Panel; Future; Expected date: 2024        Plan:   Risks and benefits of hormone replacement therapy were discussed.  Hormone replacement therapy options, including bioidentical versus non-bioidentical hormones, as well as alternatives discussed.    HFT test, if WNL, will begin Vivell Dot 0.1 mg/24 hr TD every 3 days, Prometrium 200 mg PO QHS. Instructed to keep scheduled HFT in April for reassessment.     Referral for sleep study, concern for MARKEL.     No current interest in Testosterone therapy for libido, plan to discuss at future visit.     We spent a " significant amt of time discussing estrogen replacement therapy.  We discussed the risks and benefits including breast cancer and embolic risk, osteoporosis and heart and colon health benefits.  Pt understands that there are many different ways to take estrogen and many different doses and that she does not have to take long term unless she chooses.  She understands that if she still has her uterus, she will need to take progesterone with this to decrease risk of endometrial cancer.We discussed side effects that might include but not limited to headaches, edema, nausea.      Follow up in 3 months  Will recheck labs once on typical optimal dose or if having side effects.  Instructed patient to call if she experiences any side effects or has any questions.       VIDAL Neumann

## 2024-03-12 ENCOUNTER — PATIENT MESSAGE (OUTPATIENT)
Dept: OBSTETRICS AND GYNECOLOGY | Facility: CLINIC | Age: 54
End: 2024-03-12
Payer: COMMERCIAL

## 2024-04-02 ENCOUNTER — LAB VISIT (OUTPATIENT)
Dept: LAB | Facility: HOSPITAL | Age: 54
End: 2024-04-02
Attending: INTERNAL MEDICINE
Payer: COMMERCIAL

## 2024-04-02 DIAGNOSIS — R74.01 ELEVATED ALT MEASUREMENT: ICD-10-CM

## 2024-04-02 LAB
ALBUMIN SERPL BCP-MCNC: 4.1 G/DL (ref 3.5–5.2)
ALP SERPL-CCNC: 65 U/L (ref 55–135)
ALT SERPL W/O P-5'-P-CCNC: 17 U/L (ref 10–44)
AST SERPL-CCNC: 13 U/L (ref 10–40)
BILIRUB DIRECT SERPL-MCNC: 0.2 MG/DL (ref 0.1–0.3)
BILIRUB SERPL-MCNC: 0.5 MG/DL (ref 0.1–1)
PROT SERPL-MCNC: 6.8 G/DL (ref 6–8.4)

## 2024-04-02 PROCEDURE — 36415 COLL VENOUS BLD VENIPUNCTURE: CPT | Performed by: INTERNAL MEDICINE

## 2024-04-02 PROCEDURE — 80076 HEPATIC FUNCTION PANEL: CPT | Performed by: INTERNAL MEDICINE

## 2024-04-07 ENCOUNTER — PATIENT MESSAGE (OUTPATIENT)
Dept: OBSTETRICS AND GYNECOLOGY | Facility: CLINIC | Age: 54
End: 2024-04-07
Payer: COMMERCIAL

## 2024-04-08 ENCOUNTER — PATIENT MESSAGE (OUTPATIENT)
Dept: OBSTETRICS AND GYNECOLOGY | Facility: CLINIC | Age: 54
End: 2024-04-08
Payer: COMMERCIAL

## 2024-04-08 ENCOUNTER — OFFICE VISIT (OUTPATIENT)
Dept: INTERNAL MEDICINE | Facility: CLINIC | Age: 54
End: 2024-04-08
Payer: COMMERCIAL

## 2024-04-08 VITALS
SYSTOLIC BLOOD PRESSURE: 104 MMHG | DIASTOLIC BLOOD PRESSURE: 64 MMHG | WEIGHT: 185.88 LBS | OXYGEN SATURATION: 97 % | HEART RATE: 70 BPM | HEIGHT: 68 IN | BODY MASS INDEX: 28.17 KG/M2

## 2024-04-08 DIAGNOSIS — L25.9 CONTACT DERMATITIS, UNSPECIFIED CONTACT DERMATITIS TYPE, UNSPECIFIED TRIGGER: ICD-10-CM

## 2024-04-08 DIAGNOSIS — Z80.0 FH: COLON CANCER: ICD-10-CM

## 2024-04-08 DIAGNOSIS — F32.A DEPRESSION, UNSPECIFIED DEPRESSION TYPE: ICD-10-CM

## 2024-04-08 DIAGNOSIS — Z00.00 ANNUAL PHYSICAL EXAM: Primary | ICD-10-CM

## 2024-04-08 DIAGNOSIS — N95.1 MENOPAUSAL SYMPTOMS: Primary | ICD-10-CM

## 2024-04-08 DIAGNOSIS — N95.1 MENOPAUSAL SYMPTOMS: ICD-10-CM

## 2024-04-08 PROCEDURE — 1159F MED LIST DOCD IN RCRD: CPT | Mod: CPTII,S$GLB,, | Performed by: INTERNAL MEDICINE

## 2024-04-08 PROCEDURE — 1160F RVW MEDS BY RX/DR IN RCRD: CPT | Mod: CPTII,S$GLB,, | Performed by: INTERNAL MEDICINE

## 2024-04-08 PROCEDURE — 99999 PR PBB SHADOW E&M-EST. PATIENT-LVL III: CPT | Mod: PBBFAC,,, | Performed by: INTERNAL MEDICINE

## 2024-04-08 PROCEDURE — 3044F HG A1C LEVEL LT 7.0%: CPT | Mod: CPTII,S$GLB,, | Performed by: INTERNAL MEDICINE

## 2024-04-08 PROCEDURE — 99396 PREV VISIT EST AGE 40-64: CPT | Mod: S$GLB,,, | Performed by: INTERNAL MEDICINE

## 2024-04-08 PROCEDURE — 3008F BODY MASS INDEX DOCD: CPT | Mod: CPTII,S$GLB,, | Performed by: INTERNAL MEDICINE

## 2024-04-08 PROCEDURE — 3074F SYST BP LT 130 MM HG: CPT | Mod: CPTII,S$GLB,, | Performed by: INTERNAL MEDICINE

## 2024-04-08 PROCEDURE — 3078F DIAST BP <80 MM HG: CPT | Mod: CPTII,S$GLB,, | Performed by: INTERNAL MEDICINE

## 2024-04-08 RX ORDER — TRIAMCINOLONE ACETONIDE 1 MG/G
CREAM TOPICAL 2 TIMES DAILY
Qty: 30 G | Refills: 0 | Status: SHIPPED | OUTPATIENT
Start: 2024-04-08

## 2024-04-08 RX ORDER — ESTRADIOL 2 MG/1
2 TABLET ORAL DAILY
Qty: 30 TABLET | Refills: 11 | Status: SHIPPED | OUTPATIENT
Start: 2024-04-08 | End: 2024-05-08

## 2024-04-08 RX ORDER — ESCITALOPRAM OXALATE 10 MG/1
10 TABLET ORAL DAILY
Qty: 90 TABLET | Refills: 3 | Status: SHIPPED | OUTPATIENT
Start: 2024-04-08

## 2024-04-08 NOTE — PROGRESS NOTES
Subjective     Patient ID: Heather Scheuermann Hammond is a 54 y.o. female.    Chief Complaint: Annual Exam    HPI  Hot flashes better with estrogen patch.  Adhesive is irritating skin.    Lexapro has helped mood.     Irritable.    Also c/o periodic itching of mons pubis.    Intermittent lower back pain.    Fingers sometime go numb when driving.    No happy with job - exhausted from running around.    Father does not have Warren Syndrome    He did have colon cancer     Review of Systems   Constitutional:  Negative for fever and unexpected weight change.   HENT:  Negative for nasal congestion and postnasal drip.    Eyes:  Negative for pain, discharge and visual disturbance.   Respiratory:  Negative for cough, chest tightness, shortness of breath and wheezing.    Cardiovascular:  Negative for chest pain and leg swelling.   Gastrointestinal:  Negative for abdominal pain, constipation, diarrhea and nausea.   Genitourinary:  Negative for difficulty urinating, dysuria and hematuria.   Integumentary:  Negative for rash.   Neurological:  Negative for headaches.   Psychiatric/Behavioral:  Negative for dysphoric mood and sleep disturbance. The patient is not nervous/anxious.           Objective     Physical Exam  Constitutional:       General: She is not in acute distress.     Appearance: She is well-developed.   HENT:      Head: Normocephalic and atraumatic.      Right Ear: External ear normal.      Left Ear: External ear normal.      Nose: Nose normal.   Eyes:      General: No scleral icterus.        Right eye: No discharge.         Left eye: No discharge.      Conjunctiva/sclera: Conjunctivae normal.      Pupils: Pupils are equal, round, and reactive to light.   Neck:      Thyroid: No thyromegaly.      Vascular: No JVD.   Cardiovascular:      Rate and Rhythm: Normal rate and regular rhythm.      Heart sounds: Normal heart sounds. No murmur heard.     No gallop.   Pulmonary:      Effort: Pulmonary effort is normal. No  respiratory distress.      Breath sounds: Normal breath sounds. No wheezing or rales.   Abdominal:      General: Bowel sounds are normal. There is no distension.      Palpations: Abdomen is soft. There is no mass.      Tenderness: There is no abdominal tenderness. There is no guarding or rebound.   Musculoskeletal:         General: No tenderness. Normal range of motion.      Cervical back: Normal range of motion and neck supple.   Lymphadenopathy:      Cervical: No cervical adenopathy.   Skin:     General: Skin is warm and dry.      Findings: Rash (mild erythema lower abd wall, at previous site of patch) present.   Neurological:      Mental Status: She is alert and oriented to person, place, and time.      Cranial Nerves: No cranial nerve deficit.      Coordination: Coordination normal.   Psychiatric:         Behavior: Behavior normal.         Thought Content: Thought content normal.         Judgment: Judgment normal.            Results for orders placed or performed in visit on 04/02/24   Hepatic Function Panel   Result Value Ref Range    Total Protein 6.8 6.0 - 8.4 g/dL    Albumin 4.1 3.5 - 5.2 g/dL    Total Bilirubin 0.5 0.1 - 1.0 mg/dL    Bilirubin, Direct 0.2 0.1 - 0.3 mg/dL    AST 13 10 - 40 U/L    ALT 17 10 - 44 U/L    Alkaline Phosphatase 65 55 - 135 U/L      Lab Results   Component Value Date    WBC 5.17 01/08/2024    HGB 14.0 01/08/2024    HCT 41.7 01/08/2024     01/08/2024    CHOL 224 (H) 01/08/2024    TRIG 178 (H) 01/08/2024    HDL 54 01/08/2024    ALT 17 04/02/2024    AST 13 04/02/2024     01/08/2024    K 4.8 01/08/2024     01/08/2024    CREATININE 0.8 01/08/2024    BUN 15 01/08/2024    CO2 27 01/08/2024    TSH 0.969 06/30/2021    INR 1.0 07/06/2015    HGBA1C 5.6 01/08/2024     Assessment and Plan     1. Annual physical exam    2. Contact dermatitis, unspecified contact dermatitis type, unspecified trigger    3. FH: colon cancer    4. Depression, unspecified depression type  -      EScitalopram oxalate (LEXAPRO) 10 MG tablet; Take 1 tablet (10 mg total) by mouth once daily.  Dispense: 90 tablet; Refill: 3    5. Menopausal symptoms    Other orders  -     triamcinolone acetonide 0.1% (KENALOG) 0.1 % cream; Apply topically 2 (two) times daily.  Dispense: 30 g; Refill: 0      6.  Elevated ALT, resolved.    Stop estrogen patch.     May elect oral supplementation.    Exercise regularly.  Weight loss diet reviewed.        Follow up in about 1 year (around 4/8/2025) for PE.

## 2024-04-11 ENCOUNTER — PATIENT MESSAGE (OUTPATIENT)
Dept: OBSTETRICS AND GYNECOLOGY | Facility: CLINIC | Age: 54
End: 2024-04-11
Payer: COMMERCIAL

## 2024-05-05 ENCOUNTER — PATIENT MESSAGE (OUTPATIENT)
Dept: OBSTETRICS AND GYNECOLOGY | Facility: CLINIC | Age: 54
End: 2024-05-05
Payer: COMMERCIAL

## 2024-05-06 DIAGNOSIS — N95.0 POSTMENOPAUSAL BLEEDING: Primary | ICD-10-CM

## 2024-05-07 DIAGNOSIS — N95.1 MENOPAUSAL SYMPTOMS: ICD-10-CM

## 2024-05-08 RX ORDER — ESTRADIOL 2 MG/1
2 TABLET ORAL
Qty: 90 TABLET | Refills: 4 | Status: SHIPPED | OUTPATIENT
Start: 2024-05-08 | End: 2024-05-15

## 2024-05-15 DIAGNOSIS — N95.1 MENOPAUSAL SYMPTOMS: ICD-10-CM

## 2024-05-15 RX ORDER — ESTRADIOL 2 MG/1
2 TABLET ORAL
Qty: 90 TABLET | Refills: 5 | Status: SHIPPED | OUTPATIENT
Start: 2024-05-15

## 2024-06-10 ENCOUNTER — PATIENT MESSAGE (OUTPATIENT)
Dept: INTERNAL MEDICINE | Facility: CLINIC | Age: 54
End: 2024-06-10
Payer: COMMERCIAL

## 2024-08-08 ENCOUNTER — HOSPITAL ENCOUNTER (OUTPATIENT)
Dept: RADIOLOGY | Facility: HOSPITAL | Age: 54
Discharge: HOME | End: 2024-08-08
Attending: FAMILY MEDICINE
Payer: COMMERCIAL

## 2024-08-08 ENCOUNTER — TELEPHONE (OUTPATIENT)
Dept: INTERNAL MEDICINE | Facility: CLINIC | Age: 54
End: 2024-08-08
Payer: COMMERCIAL

## 2024-08-08 ENCOUNTER — OFFICE VISIT (OUTPATIENT)
Dept: INTERNAL MEDICINE | Facility: CLINIC | Age: 54
End: 2024-08-08
Attending: FAMILY MEDICINE
Payer: COMMERCIAL

## 2024-08-08 DIAGNOSIS — M54.2 NECK PAIN: ICD-10-CM

## 2024-08-08 DIAGNOSIS — M54.2 NECK PAIN: Primary | ICD-10-CM
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
## 2024-08-08 PROCEDURE — 99999 PR PBB SHADOW E&M-EST. PATIENT-LVL III: CPT | Mod: PBBFAC,,, | Performed by: FAMILY MEDICINE

## 2024-08-08 PROCEDURE — 72040 X-RAY EXAM NECK SPINE 2-3 VW: CPT | Mod: TC

## 2024-08-08 PROCEDURE — 1160F RVW MEDS BY RX/DR IN RCRD: CPT | Mod: CPTII,S$GLB,, | Performed by: FAMILY MEDICINE

## 2024-08-08 PROCEDURE — 99213 OFFICE O/P EST LOW 20 MIN: CPT | Mod: S$GLB,,, | Performed by: FAMILY MEDICINE

## 2024-08-08 PROCEDURE — 72040 X-RAY EXAM NECK SPINE 2-3 VW: CPT | Mod: 26,,, | Performed by: RADIOLOGY

## 2024-08-08 PROCEDURE — 3044F HG A1C LEVEL LT 7.0%: CPT | Mod: CPTII,S$GLB,, | Performed by: FAMILY MEDICINE

## 2024-08-08 PROCEDURE — 1159F MED LIST DOCD IN RCRD: CPT | Mod: CPTII,S$GLB,, | Performed by: FAMILY MEDICINE

## 2024-08-09 ENCOUNTER — PATIENT MESSAGE (OUTPATIENT)
Dept: INTERNAL MEDICINE | Facility: CLINIC | Age: 54
End: 2024-08-09
Payer: COMMERCIAL

## 2024-08-09 ENCOUNTER — TELEPHONE (OUTPATIENT)
Dept: NEUROLOGY | Facility: CLINIC | Age: 54
End: 2024-08-09
Payer: COMMERCIAL

## 2024-08-09 RX ORDER — CYCLOBENZAPRINE HCL 5 MG
5 TABLET ORAL 3 TIMES DAILY PRN
Qty: 45 TABLET | Refills: 0 | Status: SHIPPED | OUTPATIENT
Start: 2024-08-09

## 2024-08-12 ENCOUNTER — OFFICE VISIT (OUTPATIENT)
Dept: NEUROLOGY | Facility: CLINIC | Age: 54
End: 2024-08-12
Attending: FAMILY MEDICINE
Payer: COMMERCIAL

## 2024-08-12 VITALS — DIASTOLIC BLOOD PRESSURE: 73 MMHG | HEART RATE: 70 BPM | SYSTOLIC BLOOD PRESSURE: 115 MMHG

## 2024-08-12 DIAGNOSIS — S06.0XAA CONCUSSION WITH UNKNOWN LOSS OF CONSCIOUSNESS STATUS, INITIAL ENCOUNTER: Primary | ICD-10-CM

## 2024-08-12 DIAGNOSIS — H51.11 CONVERGENCE INSUFFICIENCY: ICD-10-CM

## 2024-08-12 DIAGNOSIS — M54.81 CERVICO-OCCIPITAL NEURALGIA: ICD-10-CM
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
## 2024-08-12 DIAGNOSIS — S13.4XXA WHIPLASH INJURIES, INITIAL ENCOUNTER: ICD-10-CM

## 2024-08-12 DIAGNOSIS — R53.83 FATIGUE DUE TO SLEEP PATTERN DISTURBANCE: ICD-10-CM

## 2024-08-12 DIAGNOSIS — R26.89 IMBALANCE: ICD-10-CM

## 2024-08-12 DIAGNOSIS — M54.2 CERVICALGIA OF OCCIPITO-ATLANTO-AXIAL REGION: ICD-10-CM

## 2024-08-12 DIAGNOSIS — G47.9 FATIGUE DUE TO SLEEP PATTERN DISTURBANCE: ICD-10-CM

## 2024-08-12 DIAGNOSIS — R41.89 COGNITIVE CHANGES: ICD-10-CM

## 2024-08-12 PROCEDURE — 99999 PR PBB SHADOW E&M-EST. PATIENT-LVL V: CPT | Mod: PBBFAC,,, | Performed by: STUDENT IN AN ORGANIZED HEALTH CARE EDUCATION/TRAINING PROGRAM

## 2024-08-12 RX ORDER — METHYLPREDNISOLONE 4 MG/1
TABLET ORAL
Qty: 1 EACH | Refills: 0 | Status: SHIPPED | OUTPATIENT
Start: 2024-08-12

## 2024-08-12 NOTE — LETTER
August 12, 2024    Heather Scheuermann Hammond  [address] [facility] [place] [address]             [facility] - Neurology 7th Fl  [address]  [facility] [address]  Phone: [phone]  Fax: [phone] To whom it may concern,    Heather Scheuermann Hammond is a patient under my care at the [facility]. Due to an injury on 8/8/2024, as of 8/12/2024 Heather Scheuermann Hammond may do work between 9am to 3pm with all work to be done at eye level to minimize continued neck flexion and not to lift more than 5-10 lbs and to bending at the knees and not at the waist when lifting, and will re-evaluate work status in at their next clinical follow up.     Please feel free to contact our clinic should you have any questions.      Sincerely,        Loida Batres MD

## 2024-08-12 NOTE — PATIENT INSTRUCTIONS
Medrol dose pack prescribed. Take as instructed on package insert.  The patient was instructed to ice the occipital region for no more than 20 minutes at least once a day but may repeat this as many times as they would like.  Discussed ergonomic accommodations for occipital neuritis/neuralgia. Mainly perform all work at eye level to minimize continued neck flexion which will aggravate the nerve.  Patient was encouraged to do daily light cardio exercise but instructed to limit physical activities to walking, walking in water up to the waist only or riding a stationary bike, recumbent preferred. No weight lifting in upper body, no neck massage, no acupuncture of neck, and no dry needling of upper neck. No neck PT unless otherwise stated. If neck PT is recommended, the therapist may do joint manipulation at this time but no suboccipital soft tissue therapy. Any of your therapists may also do passive neck range of motion activities. No chiropractor work on neck. Lower body strengthening with resistant bands, leg machines, and strapping weights to legs okay. No core body workouts. No running or use of cardio equipment other than stationary bike. No swimming or body surfing. No amusement park rides. No lifting more than 5-10 lbs and bend at the knees, not the waist.  Referral for vestibular PT for imbalance, convergence insufficiency  Referral for lower neck/upper back to mid back PT with dry needling. No soft tissue manipulation of suboccipital region if you start to feel worse. All joint manipulation is fine.   Referral for speech therapy for cognitive impairment/word-finding difficulty  Stop taking the Flexeril. Start tizanidine 4 mg nightly. Take 30 minutes before bed. Never drink alcohol or drive after taking this medication.
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
## 2024-08-13 ENCOUNTER — PATIENT MESSAGE (OUTPATIENT)
Dept: NEUROLOGY | Facility: CLINIC | Age: 54
End: 2024-08-13
Payer: COMMERCIAL

## 2024-08-13 RX ORDER — TIZANIDINE 4 MG/1
4 TABLET ORAL NIGHTLY PRN
Qty: 30 TABLET | Refills: 5 | Status: SHIPPED | OUTPATIENT
Start: 2024-08-13 | End: 2024-08-16

## 2024-08-13 RX ORDER — TIZANIDINE 4 MG/1
4 TABLET ORAL NIGHTLY PRN
Qty: 30 TABLET | Refills: 5 | Status: SHIPPED | OUTPATIENT
Start: 2024-08-13 | End: 2024-08-13

## 2024-08-15 ENCOUNTER — TELEPHONE (OUTPATIENT)
Dept: SPINE | Facility: CLINIC | Age: 54
End: 2024-08-15
Payer: COMMERCIAL

## 2024-08-15 NOTE — TELEPHONE ENCOUNTER
LVM c pt. Attempting to confirm appt location & time c Angeles Dawson MD. Requested a call back to the [facility] at [phone] with any questions, concerns or need for a different appointment time.

## 2024-08-16 ENCOUNTER — HOSPITAL ENCOUNTER (OUTPATIENT)
Dept: RADIOLOGY | Facility: OTHER | Age: 54
Discharge: HOME | End: 2024-08-16
Attending: PHYSICAL MEDICINE & REHABILITATION
Payer: COMMERCIAL

## 2024-08-16 ENCOUNTER — OFFICE VISIT (OUTPATIENT)
Dept: SPINE | Facility: CLINIC | Age: 54
End: 2024-08-16
Attending: PHYSICAL MEDICINE & REHABILITATION
Payer: COMMERCIAL

## 2024-08-16 DIAGNOSIS — M54.50 ACUTE MIDLINE LOW BACK PAIN WITHOUT SCIATICA: ICD-10-CM

## 2024-08-16 DIAGNOSIS — M54.2 NECK PAIN: ICD-10-CM

## 2024-08-16 DIAGNOSIS — M54.50 ACUTE MIDLINE LOW BACK PAIN WITHOUT SCIATICA: Primary | ICD-10-CM

## 2024-08-16 DIAGNOSIS — M62.838 MUSCLE SPASM: ICD-10-CM

## 2024-08-16 PROCEDURE — 72110 X-RAY EXAM L-2 SPINE 4/>VWS: CPT | Mod: 26,,, | Performed by: RADIOLOGY

## 2024-08-16 PROCEDURE — 99999 PR PBB SHADOW E&M-EST. PATIENT-LVL III: CPT | Mod: PBBFAC,,, | Performed by: PHYSICAL MEDICINE & REHABILITATION

## 2024-08-16 PROCEDURE — 72110 X-RAY EXAM L-2 SPINE 4/>VWS: CPT | Mod: TC,FY

## 2024-08-16 RX ORDER — DICLOFENAC SODIUM 75 MG/1
75 TABLET, DELAYED RELEASE ORAL 2 TIMES DAILY
Qty: 60 TABLET | Refills: 2 | Status: SHIPPED | OUTPATIENT
Start: 2024-08-16

## 2024-08-16 RX ORDER — METHOCARBAMOL 500 MG/1
500 TABLET, FILM COATED ORAL 4 TIMES DAILY
Qty: 120 TABLET | Refills: 2 | Status: SHIPPED | OUTPATIENT
Start: 2024-08-16
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
## 2024-08-19 ENCOUNTER — TELEPHONE (OUTPATIENT)
Dept: SPINE | Facility: CLINIC | Age: 54
End: 2024-08-19
Payer: COMMERCIAL

## 2024-08-20 NOTE — TELEPHONE ENCOUNTER
"Vanessa Hdz MA P [place] Christine M Staff  Cc: P [facility] Referral Follow Up  Good morning/Afternoon      Please respond YES or NO via In-basket or contact [facility] @ [phone]      Is this procedure medically urgent or non-medically urgent?          Medical Urgency Definition    If you can Answer yes to one of the following questions, the  Case will be considered "Medically Urgent" and will be done as  Scheduled irrespective of whether [facility] will receive payment.    *If this particular case is not done as scheduled, would the patient  Experience loss of life?    *Loss of Limb?    *Irreversible loss of function?    *Would their condition significantly worsen?            Financial Call Center has not been able to contact patient.    If this is not "Medically Urgent", please have your office contact the patient to reschedule until financial obligations can be met.      As a courtesy for DOS 8/22/2024  and Procedure/Test  Procedures:     [id number] - AMB REFERRAL/CONSULT TO PHYSICAL THERAPY/OCCUPATIONAL THERAPY    Pt has a liability balance due of $361.97    Thanks,  Vanessa Hdz MA  Financial Clearance Department  [phone]  "

## 2024-08-21 ENCOUNTER — CLINICAL SUPPORT (OUTPATIENT)
Dept: REHABILITATION | Facility: HOSPITAL | Age: 54
End: 2024-08-21
Attending: STUDENT IN AN ORGANIZED HEALTH CARE EDUCATION/TRAINING PROGRAM
Payer: COMMERCIAL

## 2024-08-21 DIAGNOSIS — R42 DIZZINESS: ICD-10-CM

## 2024-08-21 DIAGNOSIS — R26.89 BALANCE PROBLEM: ICD-10-CM

## 2024-08-21 DIAGNOSIS — R68.89 DECREASED FUNCTIONAL ACTIVITY TOLERANCE: ICD-10-CM

## 2024-08-21 DIAGNOSIS — H81.8X9 DEFICIT OF VESTIBULO-OCULAR REFLEX: Primary | ICD-10-CM

## 2024-08-21 PROCEDURE — 97162 PT EVAL MOD COMPLEX 30 MIN: CPT | Mod: PO

## 2024-08-22 ENCOUNTER — CLINICAL SUPPORT (OUTPATIENT)
Dept: REHABILITATION | Facility: OTHER | Age: 54
End: 2024-08-22
Attending: PHYSICAL MEDICINE & REHABILITATION
Payer: COMMERCIAL

## 2024-08-22 DIAGNOSIS — M54.2 NECK PAIN: ICD-10-CM

## 2024-08-22 DIAGNOSIS — Z74.09 IMPAIRED FUNCTIONAL MOBILITY, BALANCE, GAIT, AND ENDURANCE: ICD-10-CM

## 2024-08-22 DIAGNOSIS — R29.3 POSTURE ABNORMALITY: ICD-10-CM

## 2024-08-22 DIAGNOSIS — M54.2 ACUTE NECK PAIN: Primary | ICD-10-CM

## 2024-08-22 DIAGNOSIS — M54.50 ACUTE BILATERAL LOW BACK PAIN WITHOUT SCIATICA: ICD-10-CM

## 2024-08-22 DIAGNOSIS — M54.50 ACUTE MIDLINE LOW BACK PAIN WITHOUT SCIATICA: ICD-10-CM

## 2024-08-22 PROCEDURE — 97162 PT EVAL MOD COMPLEX 30 MIN: CPT | Mod: PN
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
## 2024-08-24 PROBLEM — R42 DIZZINESS: Status: ACTIVE | Noted: 2024-08-24

## 2024-08-24 PROBLEM — R68.89 DECREASED FUNCTIONAL ACTIVITY TOLERANCE: Status: ACTIVE | Noted: 2024-08-24

## 2024-08-24 PROBLEM — R26.89 BALANCE PROBLEM: Status: ACTIVE | Noted: 2024-08-24

## 2024-08-24 PROBLEM — H81.8X9 DEFICIT OF VESTIBULO-OCULAR REFLEX: Status: ACTIVE | Noted: 2024-08-24
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
## 2024-08-26 NOTE — PROGRESS NOTES
[facility]   Physical Therapy Treatment Note     Name: Heather Scheuermann [place]  Clinic Number: [id number]    Therapy Diagnosis:   Encounter Diagnoses   Name Primary?    Deficit of vestibulo-ocular reflex Yes    Dizziness     Balance problem     Decreased functional activity tolerance      Physician: Loida Batres MD    Visit Date: 8/27/2024    Physician Orders: PT Eval and Treat;   Order comments: Referral for vestibular therapy.   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]   Imbalance [R26.89]  Evaluation Date: 8/21/2024  Authorization Period Expiration: 12/31/24  Plan of Care Expiration: 10/18/2024  Visit # / Visits authorized: 1/1    PTA Visit #: 0/5     Time In: 1536  Time Out: 1618  Total Billable Time: 42 minutes    SUBJECTIVE     Pt reports: She is feeling tired this afternoon and reports having a headache and some dizziness at the start of her session. States that her Ortho PT session this morning went well.    She was not given a home exercise program on evaluation.  Response to previous treatment: tolerated evaluation well  Functional change: ongoing    Pain: 7/10  Location: headache      OBJECTIVE     Objective Measures updated at progress report unless specified.     Functional Gait Assessment:   1. Gait on level surface =  2 (6.87 s)   (3) Normal: less than 5.5 sec, no A.D., no imbalance, normal gait pattern, deviates< 6in   (2) Mild impairment: 7-5.6 sec, uses A.D., mild gait deviations, or deviates 6-10 in   (1) Moderate impairment: > 7 sec, slow speed, imbalance, deviates 10-15 in.   (0) Severe impairment: needs assist, deviates >15 in, reach/touch wall  2. Change in Gait Speed = 3   (3) Normal: smooth change w/o loss of balance or gait deviation, deviates < 6 in, significant difference between speeds   (2) Mild impairment: changes speed, but demonstrates  mild gait deviations, deviates 6-10 in, OR no deviations but unable to significantly speed, OR uses A.D.   (1) Moderate impairment: minor changes to speed, OR changes speed w/ significant deviations, deviates 10-15 in, OR  Changes speed , but loses balance & recovers   (0) Severe impairment: cannot change speed, deviates >15 in, or loses balance & needs assist  3. Gait with horizontal head turns  = 2 (mild dizziness and decreased speed)   (3) Normal: no change in gait, deviates <6 in   (2) Mild impairment: slight change in speed, deviates 6-10 in, OR uses A.D.   (1) Moderate impairment: moderate change in speed, deviates 10-15 in   (0) Severe impairment: severe disruption of gait, deviates >15in  4. Gait with vertical head turns = 2 (mild dizziness and decreased speed)   (3) Normal: no change in gait, deviates <6 in   (2) Mild impairment: slight change in speed, deviates 6-10 in OR uses A.D.   (1) Moderate impairment: moderate change in speed, deviates 10-15 in   (0) Severe impairment: severe disruption of gait, deviates >15 in  5. Gait with pivot turns = 2   (3) Normal: performs safely in 3 sec, no LOB   (2) Mild impairment: performs in >3 sec & no LOB, OR turns safely & requires several steps to regain LOB   (1) Moderate impairment: turns slow, OR requires several small steps for balance following turn & stop   (0) Severe impairment: cannot turn safely, needs assist  6. Step over obstacle = 3   (3) Normal: steps over 2 stacked boxes w/o change in speed or LOB   (2) Mild impairment: able to step over 1 box w/o change in speed or LOB   (1) Moderate impairment: steps over 1 box but must slow down, may require VC   (0) Severe impairment: cannot perform w/o assist  7. Gait with Narrow ANITA = 3   (3) Normal: 10 steps no staggering   (2) Mild impairment: 7-9 steps   (1) Moderate impairment: 4-7 steps   (0) Severe impairment: < 4 steps or cannot perform w/o assist  8. Gait with eyes closed = 1   (3) Normal: < 7 sec, no  "A.D., no LOB, normal gait pattern, deviates <6 in   (2) Mild impairment: 7.1-9 sec, mild gait deviations, deviates 6-10 in   (1) Moderate impairment: > 9 sec, abnormal pattern, LOB, deviates 10-15 in   (0) Severe impairment: cannot perform w/o assist, LOB, deviates >15in  9. Ambulating Backwards = 1   (3) Normal: no A.D., no LOB, normal gait pattern, deviates <6in   (2) Mild impairment: uses A.D., slower speed, mild gait deviations, deviates 6-10 in   (1) Moderate impairment: slow speed, abnormal gait pattern, LOB, deviates 10-15 in   (0) Severe impairment: severe gait deviations or LOB, deviates >15in  10. Steps = 2   (3) Normal: alternating feet, no rail   (2) Mild Impairment: alternating feet, uses rail   (1) Moderate impairment: step-to, uses rail   (0) Severe impairment: cannot perform safely    Score 21/30     Score:   <22/30 fall risk   <20/30 fall risk in older adults   <18/30 fall risk in Parkinsons     Treatment     Sana received the treatments listed below:      therapeutic exercises to develop strength, endurance, and ROM for 8 minutes including:    X 8 mins, SciFit recumbent stepper, level 1.5, B UE/LE for muscular/CV endurance and neural priming  > Reported improvement in her headache to 5/10 following      neuromuscular re-education activities to improve: Balance, Coordination, Sense, and Proprioception for 18 minutes. The following activities were included:    Objective testing performed above    --> Total activity time includes rest breaks as needed between tasks      therapeutic activities to improve functional performance for 16  minutes, including:    Review of home exercise program:  2 x 5 reps, Pencil push ups with 5" hold  2 x 30", Horizontal saccades, 80 bpm  2 x 30", Vertical saccades, 80 bpm  X 30", VOR x1, horizontal, 60 bpm    --> Total activity time includes rest breaks as needed between tasks      gait training to improve functional mobility and safety for 00 minutes, " including:    NP      Patient Education and Home Exercises     Home Exercises Provided and Patient Education Provided     Education provided:   8/27: - Visual demonstration, verbal instruction, and written handout provided for interventions included as part of pt's home exercise program   - PT provided education on monitoring her symptoms throughout the day to take appropriate rest breaks and improve her tolerance to functional activities    Written Home Exercises Provided: yes. Exercises were reviewed and Sana was able to demonstrate them prior to the end of the session.  Sana demonstrated good  understanding of the education provided. See EMR under Patient Instructions for exercises provided during therapy sessions    ASSESSMENT     Sana tolerated today's session fairly well with rest breaks provided as needed. Her score on the Functional Gait Assessment shows that she is at an increased risk of falling with dynamic gait activities which is in line with her reported unsteadiness. She reported good understanding of the oculomotor interventions provided today as part of her home exercise program along with desired frequency. She continues to be appropriate for skilled physical therapy interventions to improve her balance and tolerance for functional activities.     Sana Is progressing well towards her goals.   Pt prognosis is Good.     Pt will continue to benefit from skilled outpatient physical therapy to address the deficits listed in the problem list box on initial evaluation, provide pt/family education and to maximize pt's level of independence in the home and community environment.     Pt's spiritual, cultural and educational needs considered and pt agreeable to plan of care and goals.     Anticipated barriers to physical therapy: apprehension/increased fear of falling     Goals:   Short Term Goals: 4 weeks   Pt will receive an individualized home exercise program. Met 8/27/24  Pt will tolerate  performing smooth pursuits at >/= 45 bpm for at least 30 seconds to show improved visual tracking. Ongoing  Pt will tolerate performing saccades at >/= 80 bpm to for at least 30 seconds to show improved visual scanning ability. Ongoing  Pt will tolerate performing VOR x1 at >/= 70 bpm for at least 30 seconds to show improved gaze stabilization and motion tolerance. Ongoing  Pt will improve convergence point to </= 36 cm for improved near point focus. Ongoing  Pt will improve postural control with LA condition 6 score of at least 15 s with minimal sway for decreased fall risk with standing ADLs. Ongoing  PT will perform Functional Gait Assessment and other tests of motion tolerance as indicated to assess pt tolerance to functional activities. Met 8/27/24  PT will assess CRT's as indicated per pt symptoms. Ongoing     Long Term Goals: 8 weeks   Pt will be independent with an individualized home exercise program. Ongoing  Pt will tolerate performing smooth pursuits at >/= 60 bpm for at least 30 seconds to show improved visual tracking. Ongoing  Pt will tolerate performing saccades at >/= 90 bpm to for at least 30 seconds to show improved visual scanning ability. Ongoing  Pt will tolerate performing VOR x1 at >/= 80 bpm for at least 30 seconds to show improved gaze stabilization and motion tolerance. Ongoing  Pt will improve convergence point to </= 25 cm for improved near point focus. Ongoing  Pt will improve postural control with LA condition 2 score of at least 30 s with minimal sway for decreased fall risk with standing ADLs. Ongoing  Pt will improve postural control with LA condition 4 score of at least 10 s with minimal sway for decreased fall risk with standing ADLs. Ongoing  Pt will improve postural control with LA condition 6 score of at least 20 s with minimal sway for decreased fall risk with standing ADLs. Ongoing  Patient will improve her FOTO score from 48%  to at least 60% for improved self  perception of functional mobility.(score 0-100, high score indicates greater level of function) Ongoing  New 8/27/24: Pt will improve Functional Gait Assessment (FGA) score to at least 25/30 for increased independence with home and community ambulation.       PLAN     Continue to progress oculomotor, balance, and motion tolerance interventions as tolerated    Ruth Rodgers, PT, DPT, NCS

## 2024-08-27 ENCOUNTER — CLINICAL SUPPORT (OUTPATIENT)
Dept: REHABILITATION | Facility: OTHER | Age: 54
End: 2024-08-27
Payer: COMMERCIAL

## 2024-08-27 ENCOUNTER — CLINICAL SUPPORT (OUTPATIENT)
Dept: REHABILITATION | Facility: HOSPITAL | Age: 54
End: 2024-08-27
Payer: COMMERCIAL

## 2024-08-27 DIAGNOSIS — H81.8X9 DEFICIT OF VESTIBULO-OCULAR REFLEX: Primary | ICD-10-CM

## 2024-08-27 DIAGNOSIS — R26.89 BALANCE PROBLEM: ICD-10-CM

## 2024-08-27 DIAGNOSIS — M54.2 ACUTE NECK PAIN: Primary | ICD-10-CM

## 2024-08-27 DIAGNOSIS — R68.89 DECREASED FUNCTIONAL ACTIVITY TOLERANCE: ICD-10-CM

## 2024-08-27 DIAGNOSIS — R29.3 POSTURE ABNORMALITY: ICD-10-CM

## 2024-08-27 DIAGNOSIS — M54.50 ACUTE BILATERAL LOW BACK PAIN WITHOUT SCIATICA: ICD-10-CM

## 2024-08-27 DIAGNOSIS — R42 DIZZINESS: ICD-10-CM

## 2024-08-27 DIAGNOSIS — Z74.09 IMPAIRED FUNCTIONAL MOBILITY, BALANCE, GAIT, AND ENDURANCE: ICD-10-CM

## 2024-08-27 PROCEDURE — 97110 THERAPEUTIC EXERCISES: CPT | Mod: PO

## 2024-08-27 PROCEDURE — 97112 NEUROMUSCULAR REEDUCATION: CPT | Mod: PO

## 2024-08-27 PROCEDURE — 97530 THERAPEUTIC ACTIVITIES: CPT | Mod: PO

## 2024-08-27 PROCEDURE — 97112 NEUROMUSCULAR REEDUCATION: CPT | Mod: PN

## 2024-08-27 PROCEDURE — 97530 THERAPEUTIC ACTIVITIES: CPT | Mod: PN

## 2024-08-27 NOTE — PATIENT INSTRUCTIONS
Access Code: [email or web address]  URL: [email or web address]  Date: 08/27/2024  Prepared by: Christen Guadalupe    Exercises  - Seated Scapular Retraction  - 1 x daily - 1 sets - 20 reps - 5 second hold  - Supine Chin Tuck  - 1 x daily - 1 sets - 20 reps - 5 hold  - Supine Cervical Retraction with Towel  - 1 x daily - 1 sets - 20 reps - 3 seconds hold  - No Money  - 1 x daily - 2 sets - 20 reps - 3 second hold  - Shoulder extension with resistance - Neutral  - 1 x daily - 1 sets - 30 reps  - Standing Shoulder Row with Anchored Resistance  - 1 x daily - 3 sets - 10 reps  - Supine Bridge  - 1 x daily - 2 sets - 10 reps - 3 hold  - Hooklying Clamshell with Resistance  - 1 x daily - 3-5 x weekly - 2 sets - 10 reps  - Supine Lower Trunk Rotation  - 1 x daily - 1 sets - 20 reps - 3 hold  - Standing Hip Abduction  - 1 x daily - 3-5 x weekly - 2 sets - 10 reps  - Standing Hip Extension  - 1 x daily - 2 sets - 10 reps

## 2024-08-27 NOTE — PROGRESS NOTES
[facility]   Physical Therapy Treatment Note     Name: Heather Scheuermann [place]  Clinic Number: [id number]    Therapy Diagnosis:   Encounter Diagnoses   Name Primary?    Acute neck pain Yes    Acute bilateral low back pain without sciatica     Posture abnormality     Impaired functional mobility, balance, gait, and endurance      Physician: Loida Batres MD; Eunice Reynoso    Visit Date: 8/27/2024    Physician Orders: PT Evaluate and Treat - Cervical retraction scapula stabilization posture training and back and core exercises and home exercise program; Referral for lower neck/upper back to mid back PT with dry needling. No soft tissue manipulation of suboccipital region if you start to feel worse. All joint manipulation is fine.    Medical Diagnosis from Referral: Neck pain (M54.2); Acute midline low back pain without sciatica (M54.50)  Evaluation Date: 8/22/2024  Authorization Period Expiration: 12/31/2024  Plan of Care Certification Period: 8/22/2024 - 10/18/2024  Visit # / Visits authorized: 1/ 20 (+ evaluation)       Progress Note Due: 9/22/2024   FOCUS ON THERAPEUTIC OUTCOMES (FOTO): 8/22/2024 (2/5; 1)  PTA Visit #: 0/5     Precautions: Standard and concussion    Time In: 11:06 am  Time Out: 12:05 pm  Total Billable Time: 59 minutes      SUBJECTIVE     Patient reports: she is continuing to experience the neck pain. The doctor (neurologist) told her she would be getting dry needling and she would like to try that.  She was not compliant with home exercise program as one was not provided.  Response to previous treatment: none  Functional change: none    Pain: 5/10  Location: bilateral neck and low back, headaches     OBJECTIVE     Objective Measures updated at progress report unless specified.       TREATMENT     Sana received the treatments listed below:      received therapeutic exercises to develop strength and range of motion for 3  minutes including:  [x] Pectoral  stretch over towel roll x3 minutes     received the following manual therapy techniques: Soft tissue Mobilization were applied to the: neck/low back for 00 minutes, including:     received the following dynamic functional therapeutic activities to improve functional performance for 12 minutes, including:  [x] Review of home exercise program  [x] Education regarding dry needling, review of consent form     received the following neuromuscular re-education activities to improve: Balance, Coordination, Kinesthetic, Sense, Proprioception, and Posture for 44 minutes. The following activities were included:  [x] Chin tuck 20x 3 second holds  [x] Chin tuck + rotation x10 each   [x] Cervical retraction 20x 3 second holds  [x] Scapular retraction 5 second holds over towel roll x2 minutes  [x] Supine horizontal abduction with red band over towel roll 2x 10   [x] Seated no money with red band and 1/2 foam roll 2x 10     [x] Row with red theratube 2x 10   [x] Shoulder extension with red theratube 2x 10     [x] Bridge 2x 10   [x] Lower trunk rotation x3 minutes  [x] Supine clamshells 3-ways x20 each   [x] Standing hip abduction 2x 10 bilateral   [x] Standing hip extension 2x 10 bilateral           PATIENT EDUCATION AND HOME EXERCISES     Home Exercises Provided and Patient Education Provided     Education provided:   - home exercise program performance  - exercise form and purpose  - dry needling consent form, benefits, risks, purpose      Written Home Exercises Provided: yes. Exercises were reviewed and Sana was able to demonstrate them prior to the end of the session.  Sana demonstrated fair  understanding of the education provided. See EMR under Patient Instructions for exercises provided during therapy sessions    ASSESSMENT     Patient had fair tolerance to initial treatment session with verbal/tactile cueing provided intermittently throughout. Provided and reviewed home exercise program performed today with copy  given to patient as well as red band to complete. Fatigued more with gentle resistance exercises today. Reviewed dry needling consent form today with plan to initiate at next visit for the neck/cervical spine as per patient and MD request.     Sana Is progressing well towards her goals.   Patient prognosis is Good.     Patient will continue to benefit from skilled outpatient physical therapy to address the deficits listed in the problem list box on initial evaluation, provide patient/family education and to maximize patient's level of independence in the home and community environment.     Patient's spiritual, cultural and educational needs considered and Patient agreeable to plan of care and goals.     Anticipated barriers to physical therapy: concussion symptoms, multiple therapies; multiple body parts involved     Goals:      Short term goals: In 4 weeks,  Goal Status   Patient will be independent with home exercise program to promote improved therapy outcomes.   in progress   2. Patient will perform palloff press with good control to demonstrate improved core strength  in progress   3. Patient will improve bilateral lower extremity Manual Muscle test to 4/5 to demonstrate improved strength for functional tasks including dressing, bathing, grooming, walking, stairs and transitional movements.   in progress   4. Patient will improve shoulder flexion and cervical range of motion by 10 degrees to improve functional mobility including dressing, bathing, grooming, walking, stairs and transitional movements.   in progress   5. Patient will improve TUG test to 15 sec to improve walking speed for functional community ambulation  in progress   6. Patient will improve 5x sit<>stand to 15 to improve functional strength and promote mobility.   in progress   7. Patient will require minimal verbal cueing from PT for proper scapular retraction in order to improve postural awareness.  in progress         Long term goals: In 8  weeks, Goal Status   8. Patient will be independent with progressed home exercise program to self manage symptoms.   in progress   9. Patient will improve bilateral upper extremity/lower extremity Manual Muscle test to 5/5 to improve strength for functional tasks including dressing, bathing, walking, stairs and transitional movements.   in progress   10. Patient will report walking for 30 minutes with <2/10 pain 5x/week to promote healthy lifestyle and regular physical exercise.   in progress   11. Patient will improve Lumbar Focus On therapeutic Outcomes (FOTO) from 46% to 68% to decrease perceived limitation with maintaining/changing body position.   in progress   12. Patient will improve Neck Focus On therapeutic Outcomes (FOTO) from 36% to 59% to decrease perceived limitation with maintaining/changing body position.   in progress   13. Patient will improve TUG test to 11 sec to improve walking speed for functional community ambulation  in progress   14. Patient will improve bilateral single leg stance to 15 seconds to decrease fall risk and improve ambulation.  in progress   15. Patient will improve 5x sit<>stand to 11 seconds to improve functional strength and promote mobility.   in progress   16. Patient will improve 30 second sit<>stand to 15 reps to improve functional strength and promote mobility.  in progress   17. Patient will lift 10-15 lbs with <2/10 pain to move her plants and perform household chores.   in progress        PLAN     Continue with established PT plan of care and progress per patient tolerance.  Plan to initiate dry needling at next visit    Plan of care Certification: 8/22/2024 to 11/15/2024.     Outpatient Physical Therapy 1-2 times weekly for 12 weeks to include the following interventions: Gait Training, Manual Therapy, Moist Heat/ Ice, Neuromuscular Re-ed, Patient Education, Therapeutic Activities, and Therapeutic Exercise.   Patient will be seen by a physical therapist minimally  every 6th visit or every 30 days.    Christen Guadalupe, PT
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
## 2024-09-05 ENCOUNTER — OFFICE VISIT (OUTPATIENT)
Dept: NEUROLOGY | Facility: CLINIC | Age: 54
End: 2024-09-05
Payer: COMMERCIAL

## 2024-09-05 VITALS — SYSTOLIC BLOOD PRESSURE: 123 MMHG | DIASTOLIC BLOOD PRESSURE: 69 MMHG | HEART RATE: 74 BPM

## 2024-09-05 DIAGNOSIS — R41.89 COGNITIVE CHANGES: ICD-10-CM

## 2024-09-05 DIAGNOSIS — R53.83 FATIGUE DUE TO SLEEP PATTERN DISTURBANCE: ICD-10-CM

## 2024-09-05 DIAGNOSIS — S06.0XAS CONCUSSION WITH UNKNOWN LOSS OF CONSCIOUSNESS STATUS, SEQUELA: Primary | ICD-10-CM

## 2024-09-05 DIAGNOSIS — S13.4XXS WHIPLASH INJURIES, SEQUELA: ICD-10-CM

## 2024-09-05 DIAGNOSIS — G47.9 FATIGUE DUE TO SLEEP PATTERN DISTURBANCE: ICD-10-CM

## 2024-09-05 DIAGNOSIS — H51.11 CONVERGENCE INSUFFICIENCY: ICD-10-CM

## 2024-09-05 DIAGNOSIS — M54.2 CERVICALGIA OF OCCIPITO-ATLANTO-AXIAL REGION: ICD-10-CM

## 2024-09-05 DIAGNOSIS — R26.89 IMBALANCE: ICD-10-CM

## 2024-09-05 PROCEDURE — 99999 PR PBB SHADOW E&M-EST. PATIENT-LVL III: CPT | Mod: PBBFAC,,, | Performed by: STUDENT IN AN ORGANIZED HEALTH CARE EDUCATION/TRAINING PROGRAM

## 2024-09-05 RX ORDER — METHYLPREDNISOLONE 4 MG/1
TABLET ORAL
Qty: 1 EACH | Refills: 0 | Status: SHIPPED | OUTPATIENT
Start: 2024-09-05

## 2024-09-05 NOTE — PATIENT INSTRUCTIONS
Plan:   Medrol dose pack prescribed. Take as instructed on package insert.  The patient was instructed to ice the occipital region for no more than 20 minutes at least once a day but may repeat this as many times as they would like.  Discussed ergonomic accommodations for occipital neuritis/neuralgia. Mainly perform all work at eye level to minimize continued neck flexion which will aggravate the nerve.  Patient was encouraged to do daily light cardio exercise but instructed to limit physical activities to walking, walking in water up to the waist only or riding a stationary bike, recumbent preferred. No weight lifting in upper body, no neck massage, no acupuncture of neck, and no dry needling of upper neck. No neck PT unless otherwise stated. If neck PT is recommended, the therapist may do joint manipulation at this time but no suboccipital soft tissue therapy. Any of your therapists may also do passive neck range of motion activities. No chiropractor work on neck. Lower body strengthening with resistant bands, leg machines, and strapping weights to legs okay. No core body workouts. No running or use of cardio equipment other than stationary bike. No swimming or body surfing. No amusement park rides. No lifting more than 5-10 lbs and bend at the knees, not the waist.  Continue vestibular PT for imbalance, convergence insufficiency  Referral for lower neck/upper back to mid back PT with dry needling. No soft tissue manipulation of suboccipital region if you start to feel worse. All joint manipulation is fine. Place previously (1st apt next week)  Referral for speech therapy for cognitive impairment/word-finding difficulty (1st apt today)  Stop taking the Flexeril. Start tizanidine 4 mg nightly prn. Take 30 minutes before bed. Never drink alcohol or drive after taking this medication.

## 2024-09-09 ENCOUNTER — CLINICAL SUPPORT (OUTPATIENT)
Dept: REHABILITATION | Facility: OTHER | Age: 54
End: 2024-09-09
Payer: COMMERCIAL

## 2024-09-09 DIAGNOSIS — M54.50 ACUTE BILATERAL LOW BACK PAIN WITHOUT SCIATICA: ICD-10-CM

## 2024-09-09 DIAGNOSIS — M54.2 ACUTE NECK PAIN: Primary | ICD-10-CM

## 2024-09-09 DIAGNOSIS — R29.3 POSTURE ABNORMALITY: ICD-10-CM

## 2024-09-09 DIAGNOSIS — Z74.09 IMPAIRED FUNCTIONAL MOBILITY, BALANCE, GAIT, AND ENDURANCE: ICD-10-CM

## 2024-09-09 PROCEDURE — 97112 NEUROMUSCULAR REEDUCATION: CPT | Mod: PN

## 2024-09-09 PROCEDURE — 97110 THERAPEUTIC EXERCISES: CPT | Mod: PN

## 2024-09-09 NOTE — PROGRESS NOTES
[facility]   Physical Therapy Treatment Note     Name: Heather Scheuermann [place]  Clinic Number: [id number]    Therapy Diagnosis:   Encounter Diagnoses   Name Primary?    Acute neck pain Yes    Acute bilateral low back pain without sciatica     Posture abnormality     Impaired functional mobility, balance, gait, and endurance        Physician: Loida Batres MD; Eunice Reynoso    Visit Date: 9/9/2024    Physician Orders: PT Evaluate and Treat - Cervical retraction scapula stabilization posture training and back and core exercises and home exercise program; Referral for lower neck/upper back to mid back PT with dry needling. No soft tissue manipulation of suboccipital region if you start to feel worse. All joint manipulation is fine.    Medical Diagnosis from Referral: Neck pain (M54.2); Acute midline low back pain without sciatica (M54.50)  Evaluation Date: 8/22/2024  Authorization Period Expiration: 12/31/2024  Plan of Care Certification Period: 8/22/2024 - 10/18/2024  Visit # / Visits authorized: 2/ 20 (+ evaluation)       Progress Note Due: 9/22/2024   FOCUS ON THERAPEUTIC OUTCOMES (FOTO): 8/22/2024 (3/5; 1)  PTA Visit #: 0/5     Precautions: Standard and concussion    Time In: 0807  Time Out: 0900  Total Billable Time: 53 minutes      SUBJECTIVE     Patient reports: a tightness this morning in her neck. States her headaches are getting milder.    She was not compliant with home exercise program as one was not provided.  Response to previous treatment: no adverse effects  Functional change: not feeing as dizzy    Pain: 9/10  Location: bilateral neck and low back, headaches     OBJECTIVE     Objective Measures updated at progress report unless specified.       TREATMENT     Below therapeutic interventions were performed under the supervision of therapist and physical therapy technician.      Sana received the treatments listed below:      received therapeutic exercises to  develop strength and range of motion for 9 minutes including:  [x] Pectoral stretch over towel roll x 3 minutes   [x]+upper body ergometer 3' forward/3' reverse following dry needling    received the following manual therapy techniques: Soft tissue Mobilization were applied to the: neck/low back for 00 minutes, including:     received the following dynamic functional therapeutic activities to improve functional performance for  minutes, including:  [] Review of home exercise program  [] Education regarding dry needling, review of consent form     received the following neuromuscular re-education activities to improve: Muscle inhibition, Balance, Coordination, Kinesthetic, Sense, Proprioception, and Posture for 44 minutes. The following activities were included:  [] Chin tuck 20 x 3 second holds  [x] Chin tuck + rotation x 10 each   [x] Cervical retraction 20 x 3 second holds  [x] Scapular retraction 5 second holds over towel roll x 2 minutes  [x] Supine horizontal abduction with red band over towel roll 2 x 10   [x] Seated no money with red band 2 x 10     [x] Row with red theratube 2x 10   [x] Shoulder extension with red theratube 2x 10     [] Bridge 2x 10   [] Lower trunk rotation x3 minutes  [] Supine clamshells 3-ways x20 each   [] Standing hip abduction 2x 10 bilateral   [] Standing hip extension 2x 10 bilateral       Application of TDN: Pt educated on benefits and potential side effects of dry needling. Educated pt on benefits, precautions, side effects following TDN. Educated pt to use heat following treatment sessions if pt is experiencing pain or soreness. Pt verbalized good understanding of education.  Pt signed written consent to dry needling. Pt gave verbal consent for DN    Pt received dry needling to the below listed muscles using 30 mm needles.  [x] Bilateral upper trapezius   [x] Bilateral levator scapulae    Winding performed every 5 minutes during treatment.         PATIENT EDUCATION AND HOME  EXERCISES     Home Exercises Provided and Patient Education Provided     Education provided:   - home exercise program performance  - exercise form and purpose  - dry needling consent form, benefits, risks, purpose      Written Home Exercises Provided: yes. Exercises were reviewed and Sana was able to demonstrate them prior to the end of the session.  Sana demonstrated fair  understanding of the education provided. See EMR under Patient Instructions for exercises provided during therapy sessions    ASSESSMENT     Initiated dry needling today. Good soft tissue response to dry needling evident by increased grasp with unilateral winding at all insertion points. Winding performed every 5 minutes during treatment. No adverse effects following treatment.     Sana Is progressing well towards her goals.   Patient prognosis is Good.     Patient will continue to benefit from skilled outpatient physical therapy to address the deficits listed in the problem list box on initial evaluation, provide patient/family education and to maximize patient's level of independence in the home and community environment.     Patient's spiritual, cultural and educational needs considered and Patient agreeable to plan of care and goals.     Anticipated barriers to physical therapy: concussion symptoms, multiple therapies; multiple body parts involved     Goals:      Short term goals: In 4 weeks,  Goal Status   Patient will be independent with home exercise program to promote improved therapy outcomes.   in progress   2. Patient will perform palloff press with good control to demonstrate improved core strength  in progress   3. Patient will improve bilateral lower extremity Manual Muscle test to 4/5 to demonstrate improved strength for functional tasks including dressing, bathing, grooming, walking, stairs and transitional movements.   in progress   4. Patient will improve shoulder flexion and cervical range of motion by 10 degrees to  improve functional mobility including dressing, bathing, grooming, walking, stairs and transitional movements.   in progress   5. Patient will improve TUG test to 15 sec to improve walking speed for functional community ambulation  in progress   6. Patient will improve 5x sit<>stand to 15 to improve functional strength and promote mobility.   in progress   7. Patient will require minimal verbal cueing from PT for proper scapular retraction in order to improve postural awareness.  in progress         Long term goals: In 8 weeks, Goal Status   8. Patient will be independent with progressed home exercise program to self manage symptoms.   in progress   9. Patient will improve bilateral upper extremity/lower extremity Manual Muscle test to 5/5 to improve strength for functional tasks including dressing, bathing, walking, stairs and transitional movements.   in progress   10. Patient will report walking for 30 minutes with <2/10 pain 5x/week to promote healthy lifestyle and regular physical exercise.   in progress   11. Patient will improve Lumbar Focus On therapeutic Outcomes (FOTO) from 46% to 68% to decrease perceived limitation with maintaining/changing body position.   in progress   12. Patient will improve Neck Focus On therapeutic Outcomes (FOTO) from 36% to 59% to decrease perceived limitation with maintaining/changing body position.   in progress   13. Patient will improve TUG test to 11 sec to improve walking speed for functional community ambulation  in progress   14. Patient will improve bilateral single leg stance to 15 seconds to decrease fall risk and improve ambulation.  in progress   15. Patient will improve 5x sit<>stand to 11 seconds to improve functional strength and promote mobility.   in progress   16. Patient will improve 30 second sit<>stand to 15 reps to improve functional strength and promote mobility.  in progress   17. Patient will lift 10-15 lbs with <2/10 pain to move her plants and perform  household chores.   in progress        PLAN     Continue with postural reeducation, neck range of motion, neck strengthening, and dry needling    Plan of care Certification: 8/22/2024 to 11/15/2024.     Outpatient Physical Therapy 1-2 times weekly for 12 weeks to include the following interventions: Gait Training, Manual Therapy, Moist Heat/ Ice, Neuromuscular Re-ed, Patient Education, Therapeutic Activities, and Therapeutic Exercise.   Patient will be seen by a physical therapist minimally every 6th visit or every 30 days.    Alcon Estrada, PT

## 2024-09-10 ENCOUNTER — CLINICAL SUPPORT (OUTPATIENT)
Dept: REHABILITATION | Facility: HOSPITAL | Age: 54
End: 2024-09-10
Payer: COMMERCIAL

## 2024-09-10 DIAGNOSIS — H81.8X9 DEFICIT OF VESTIBULO-OCULAR REFLEX: Primary | ICD-10-CM

## 2024-09-10 DIAGNOSIS — R26.89 BALANCE PROBLEM: ICD-10-CM

## 2024-09-10 DIAGNOSIS — R42 DIZZINESS: ICD-10-CM

## 2024-09-10 DIAGNOSIS — R68.89 DECREASED FUNCTIONAL ACTIVITY TOLERANCE: ICD-10-CM

## 2024-09-10 PROCEDURE — 97112 NEUROMUSCULAR REEDUCATION: CPT | Mod: PO

## 2024-09-10 NOTE — PROGRESS NOTES
[facility]   Physical Therapy Treatment Note     Name: Heather Scheuermann [place]  Clinic Number: [id number]    Therapy Diagnosis:   Encounter Diagnoses   Name Primary?    Deficit of vestibulo-ocular reflex Yes    Dizziness     Balance problem     Decreased functional activity tolerance        Physician: Loida Batres MD    Visit Date: 9/10/2024    Physician Orders: PT Eval and Treat;   Order comments: Referral for vestibular therapy.   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]   Imbalance [R26.89]  Evaluation Date: 2024  Authorization Period Expiration: 24  Plan of Care Expiration: 10/18/2024  Visit # / Visits authorized:     PTA Visit #: 0/5     Time In: 0845   Time Out: 930  Total Billable Time: 45 minutes    SUBJECTIVE     Pt reports: No headache or dizziness entering. Had Ortho PT yesterday with dry needling in neck and is slightly sore as expected from that. DID NOT like the eye exercises last session. Had severe left eye pain and eye felt swollen afterwards. Says she threw the sticks away for the home exercise program and does not want to do them today.     Response to previous treatment: left eye pain afterwards  Functional change: ongoing    Pain: 0/10  Location: headache      OBJECTIVE     Objective Measures updated at progress report unless specified.     Treatment     Sana received the treatments listed below:      therapeutic exercises to develop strength, endurance, and ROM for 5 minutes includin min walking on treadmill 1.6mph for muscular/CV endurance and neural priming    neuromuscular re-education activities to improve: Balance, Coordination, Sense, and Proprioception for 40 minutes. The following activities were included:     Seated:  Visual tracking assessment with X stick - very aggravating of headache symptoms/eye fatigue  "feeling.     VOR modified with self paced,  x5' distance post it target on wall, blank background. 3 x 20"  vertical and horizontal   Saccades, self paced, x5' distance post it target on wall, blank background. 3 x 20"   horizontal only    Walking in hallway:  Speed changes x 3 laps - with and without visual target  Backwards walking   Heel walking, toe walking tandem walking  Right/left targets walking - notable pathway deviations     Education time on reducing screen time, adjusting font sizes and dark background screens.      Patient Education and Home Exercises     Home Exercises Provided and Patient Education Provided     Education provided:   8/27: - Visual demonstration, verbal instruction, and written handout provided for interventions included as part of pt's home exercise program   - PT provided education on monitoring her symptoms throughout the day to take appropriate rest breaks and improve her tolerance to functional activities    Written Home Exercises Provided: yes. Exercises were reviewed and Sana was able to demonstrate them prior to the end of the session.  Sana demonstrated good  understanding of the education provided. See EMR under Patient Instructions for exercises provided during therapy sessions    ASSESSMENT     Sana entered reporting severe eye soreness after last session and was hesitant to do any oculomotor exercises today. She was agreeable to modified versions and less time on it. She had notable pathway deviation with hallway walking tasks and is appropriate for ongoing therapy services.        Sana Is progressing well towards her goals.   Pt prognosis is Good.     Pt will continue to benefit from skilled outpatient physical therapy to address the deficits listed in the problem list box on initial evaluation, provide pt/family education and to maximize pt's level of independence in the home and community environment.     Pt's spiritual, cultural and educational needs " considered and pt agreeable to plan of care and goals.     Anticipated barriers to physical therapy: apprehension/increased fear of falling     Goals:   Short Term Goals: 4 weeks   Pt will receive an individualized home exercise program. Met 8/27/24  Pt will tolerate performing smooth pursuits at >/= 45 bpm for at least 30 seconds to show improved visual tracking. Ongoing  Pt will tolerate performing saccades at >/= 80 bpm to for at least 30 seconds to show improved visual scanning ability. Ongoing  Pt will tolerate performing VOR x1 at >/= 70 bpm for at least 30 seconds to show improved gaze stabilization and motion tolerance. Ongoing  Pt will improve convergence point to </= 36 cm for improved near point focus. Ongoing  Pt will improve postural control with LA condition 6 score of at least 15 s with minimal sway for decreased fall risk with standing ADLs. Ongoing  PT will perform Functional Gait Assessment and other tests of motion tolerance as indicated to assess pt tolerance to functional activities. Met 8/27/24  PT will assess CRT's as indicated per pt symptoms. Ongoing     Long Term Goals: 8 weeks   Pt will be independent with an individualized home exercise program. Ongoing  Pt will tolerate performing smooth pursuits at >/= 60 bpm for at least 30 seconds to show improved visual tracking. Ongoing  Pt will tolerate performing saccades at >/= 90 bpm to for at least 30 seconds to show improved visual scanning ability. Ongoing  Pt will tolerate performing VOR x1 at >/= 80 bpm for at least 30 seconds to show improved gaze stabilization and motion tolerance. Ongoing  Pt will improve convergence point to </= 25 cm for improved near point focus. Ongoing  Pt will improve postural control with LA condition 2 score of at least 30 s with minimal sway for decreased fall risk with standing ADLs. Ongoing  Pt will improve postural control with LA condition 4 score of at least 10 s with minimal sway for decreased fall  risk with standing ADLs. Ongoing  Pt will improve postural control with LA condition 6 score of at least 20 s with minimal sway for decreased fall risk with standing ADLs. Ongoing  Patient will improve her FOTO score from 48%  to at least 60% for improved self perception of functional mobility.(score 0-100, high score indicates greater level of function) Ongoing  New 8/27/24: Pt will improve Functional Gait Assessment (FGA) score to at least 25/30 for increased independence with home and community ambulation.       PLAN     Continue to progress oculomotor, balance, and motion tolerance interventions as tolerated    Zonia Elise, PT, DPT

## 2024-09-13 NOTE — PROGRESS NOTES
"  [facility]   Physical Therapy Treatment Note     Name: Heather Scheuermann [place]  Clinic Number: [id number]    Therapy Diagnosis:   Encounter Diagnoses   Name Primary?    Deficit of vestibulo-ocular reflex Yes    Dizziness     Balance problem     Decreased functional activity tolerance      Physician: Loida Batres MD    Visit Date: 2024    Physician Orders: PT Eval and Treat;   Order comments: Referral for vestibular therapy.   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]   Imbalance [R26.89]  Evaluation Date: 2024  Authorization Period Expiration: 24  Plan of Care Expiration: 10/18/2024  Visit # / Visits authorized:     PTA Visit #: 0/5     Time In: 0800   Time Out: 0842  Total Billable Time: 42 minutes    SUBJECTIVE     Pt reports: She is feeling okay this morning but has some pain in her neck that's bothering her.     Response to previous treatment: left eye pain afterwards  Functional change: ongoing    Pain: 8/10  Location: headache      OBJECTIVE     Objective Measures updated at progress report unless specified.     Treatment     Sana received the treatments listed below:      therapeutic exercises to develop strength, endurance, and ROM for 5 minutes includin min walking on treadmill 1.6mph for muscular/CV endurance and neural priming      neuromuscular re-education activities to improve: Balance, Coordination, Sense, and Proprioception for 26 minutes. The following activities were included:     Oculomotor interventions:  Seated:  3 x20", VOR x1 horizontal, self-selected pace, , blank background  3 x20", VOR x1 vertical, self-selected pace, Modified with post it target on wall x5 feet away from pt, blank background    3 x20", Horizontal saccades, self paced, Modified with post it target on wall x5 feet away from pt, blank " "background    Balance interventions:  2 x 30", NBOS, eyes open, airex foam  2 x 30", NBOS, eyes closed, firm ground  2 x 10 reps B, Alternating cone taps, 6" cones, airex foam  X 30", NBOS, eyes open airex foam, up/down head nods  X 30", NBOS, eyes open firm ground, up/down head nods  2 x 30", NBOS, eyes open firm ground, left/right head nods      Sana participated in dynamic functional therapeutic activities to improve functional performance for 11 minutes, including:    Functional motion tolerance:  Walking in hallway:  2 x 100 feet, ambulation + up/down head nods  2 x 100 feet, ambulation + left/right head nods  X 100 feet, Backwards walking   X 100 feet, walking with speed changes      Patient Education and Home Exercises     Home Exercises Provided and Patient Education Provided     Education provided:   8/27: - Visual demonstration, verbal instruction, and written handout provided for interventions included as part of pt's home exercise program   - PT provided education on monitoring her symptoms throughout the day to take appropriate rest breaks and improve her tolerance to functional activities    Written Home Exercises Provided: yes. Exercises were reviewed and Sana was able to demonstrate them prior to the end of the session.  Sana demonstrated good  understanding of the education provided. See EMR under Patient Instructions for exercises provided during therapy sessions    ASSESSMENT     Sana tolerated today's session fairly well. She performed well with oculomotor and functional motion tolerance interventions on today with brief rest breaks required to recover to her baseline between sets/tasks. She was mostly triggered today by vision eliminated balance and balance on compliant surfaces with reports of increased nausea and dizziness noted. She continues to be appropriate for skilled physical therapy services to improve her tolerance for and safety with community mobility.      Sana Is " progressing well towards her goals.   Pt prognosis is Good.     Pt will continue to benefit from skilled outpatient physical therapy to address the deficits listed in the problem list box on initial evaluation, provide pt/family education and to maximize pt's level of independence in the home and community environment.     Pt's spiritual, cultural and educational needs considered and pt agreeable to plan of care and goals.     Anticipated barriers to physical therapy: apprehension/increased fear of falling     Goals:   Short Term Goals: 4 weeks   Pt will receive an individualized home exercise program. Met 8/27/24  Pt will tolerate performing smooth pursuits at >/= 45 bpm for at least 30 seconds to show improved visual tracking. Ongoing  Pt will tolerate performing saccades at >/= 80 bpm to for at least 30 seconds to show improved visual scanning ability. Ongoing  Pt will tolerate performing VOR x1 at >/= 70 bpm for at least 30 seconds to show improved gaze stabilization and motion tolerance. Ongoing  Pt will improve convergence point to </= 36 cm for improved near point focus. Ongoing  Pt will improve postural control with LA condition 6 score of at least 15 s with minimal sway for decreased fall risk with standing ADLs. Ongoing  PT will perform Functional Gait Assessment and other tests of motion tolerance as indicated to assess pt tolerance to functional activities. Met 8/27/24  PT will assess CRT's as indicated per pt symptoms. Ongoing     Long Term Goals: 8 weeks   Pt will be independent with an individualized home exercise program. Ongoing  Pt will tolerate performing smooth pursuits at >/= 60 bpm for at least 30 seconds to show improved visual tracking. Ongoing  Pt will tolerate performing saccades at >/= 90 bpm to for at least 30 seconds to show improved visual scanning ability. Ongoing  Pt will tolerate performing VOR x1 at >/= 80 bpm for at least 30 seconds to show improved gaze stabilization and motion  tolerance. Ongoing  Pt will improve convergence point to </= 25 cm for improved near point focus. Ongoing  Pt will improve postural control with LA condition 2 score of at least 30 s with minimal sway for decreased fall risk with standing ADLs. Ongoing  Pt will improve postural control with LA condition 4 score of at least 10 s with minimal sway for decreased fall risk with standing ADLs. Ongoing  Pt will improve postural control with LA condition 6 score of at least 20 s with minimal sway for decreased fall risk with standing ADLs. Ongoing  Patient will improve her FOTO score from 48%  to at least 60% for improved self perception of functional mobility.(score 0-100, high score indicates greater level of function) Ongoing  New 8/27/24: Pt will improve Functional Gait Assessment (FGA) score to at least 25/30 for increased independence with home and community ambulation.       PLAN     Continue to progress oculomotor, balance, and motion tolerance interventions as tolerated    Ruth Rodgers, PT, DPT, NCS

## 2024-09-16 ENCOUNTER — CLINICAL SUPPORT (OUTPATIENT)
Dept: REHABILITATION | Facility: HOSPITAL | Age: 54
End: 2024-09-16
Payer: COMMERCIAL

## 2024-09-16 DIAGNOSIS — R68.89 DECREASED FUNCTIONAL ACTIVITY TOLERANCE: ICD-10-CM

## 2024-09-16 DIAGNOSIS — R42 DIZZINESS: ICD-10-CM

## 2024-09-16 DIAGNOSIS — H81.8X9 DEFICIT OF VESTIBULO-OCULAR REFLEX: Primary | ICD-10-CM

## 2024-09-16 DIAGNOSIS — R26.89 BALANCE PROBLEM: ICD-10-CM

## 2024-09-16 PROCEDURE — 97112 NEUROMUSCULAR REEDUCATION: CPT | Mod: PO

## 2024-09-16 PROCEDURE — 97530 THERAPEUTIC ACTIVITIES: CPT | Mod: PO
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
## 2024-09-17 ENCOUNTER — CLINICAL SUPPORT (OUTPATIENT)
Dept: REHABILITATION | Facility: HOSPITAL | Age: 54
End: 2024-09-17
Attending: STUDENT IN AN ORGANIZED HEALTH CARE EDUCATION/TRAINING PROGRAM
Payer: COMMERCIAL

## 2024-09-17 DIAGNOSIS — R41.841 COGNITIVE COMMUNICATION DISORDER: Primary | ICD-10-CM

## 2024-09-17 PROCEDURE — 96125 COGNITIVE TEST BY HC PRO: CPT | Mod: PO

## 2024-09-17 NOTE — PROGRESS NOTES
[facility]   Physical Therapy Treatment Note     Name: Heather Scheuermann [place]  Clinic Number: [id number]    Therapy Diagnosis:   Encounter Diagnoses   Name Primary?    Acute neck pain Yes    Acute bilateral low back pain without sciatica     Posture abnormality     Impaired functional mobility, balance, gait, and endurance        Physician: Loida Batres MD; Eunice Reynoso    Visit Date: 9/18/2024    Physician Orders: PT Evaluate and Treat - Cervical retraction scapula stabilization posture training and back and core exercises and home exercise program; Referral for lower neck/upper back to mid back PT with dry needling. No soft tissue manipulation of suboccipital region if you start to feel worse. All joint manipulation is fine.    Medical Diagnosis from Referral: Neck pain (M54.2); Acute midline low back pain without sciatica (M54.50)  Evaluation Date: 8/22/2024  Authorization Period Expiration: 12/31/2024  Plan of Care Certification Period: 8/22/2024 - 10/18/2024  Visit # / Visits authorized: 3/ 20 (+ evaluation)       Progress Note Due: 9/22/2024   FOCUS ON THERAPEUTIC OUTCOMES (FOTO): 8/22/2024 (4/5; 1)  PTA Visit #: 0/5     Precautions: Standard and concussion    Time In: 0813  Time Out: 0900  Total Billable Time: 47 minutes      SUBJECTIVE     Patient reports: her neck and shoulders feel tense and tight. Feels like she tenses up while driving. Reports she didn't sleep well.    She was not compliant with home exercise program as one was not provided.  Response to previous treatment: felt a little sore on the day following dry needling last session  Functional change: no change reported today    Pain: 4/10  Location: bilateral neck and low back, headaches     OBJECTIVE     Objective Measures updated at progress report unless specified.       TREATMENT       Sana received the treatments listed below:      received therapeutic exercises to develop strength and range of  "motion for 9 minutes including:  [] Pectoral stretch over towel roll x 3 minutes   [x] upper body ergometer 3' forward/3' reverse following dry needling  [x]+levator scapular stretches x 30"  [x]+upper trapezius stretches x 30"  [x]+sternocleidomastoid stretches x 30"    received the following manual therapy techniques: Soft tissue Mobilization were applied to the: neck/low back for 13 minutes, including:   [x]+soft tissue mobilization bilateral sternocleidomastoid, bilateral upper trapezius, bilateral levator scapulae, bilateral temporalis  [x]+bilateral 1st rib mobilizations  [x]+bilateral scapular mobilizations    received the following dynamic functional therapeutic activities to improve functional performance for  minutes, including:  [] Review of home exercise program  [] Education regarding dry needling, review of consent form     received the following neuromuscular re-education activities to improve: Muscle inhibition, Balance, Coordination, Kinesthetic, Sense, Proprioception, and Posture for  25 minutes. The following activities were included:  [] Chin tuck 20 x 3 second holds  [] Chin tuck + rotation x 10 each   [] Cervical retraction 20 x 3 second holds  [] Scapular retraction 5 second holds over towel roll x 2 minutes  [] Supine horizontal abduction with red band over towel roll 2 x 10   [] Seated no money with red band 2 x 10     [] Row with red theratube 2x 10   [] Shoulder extension with red theratube 2x 10     [] Bridge 2x 10   [] Lower trunk rotation x3 minutes  [] Supine clamshells 3-ways x20 each   [] Standing hip abduction 2x 10 bilateral   [] Standing hip extension 2x 10 bilateral       [x] Application of TDN: Pt educated on benefits and potential side effects of dry needling. Educated pt on benefits, precautions, side effects following TDN. Educated pt to use heat following treatment sessions if pt is experiencing pain or soreness. Pt verbalized good understanding of education.  Pt signed " written consent to dry needling. Pt gave verbal consent for DN    Pt received dry needling to the below listed muscles using 30 mm needles.  [x] Bilateral upper trapezius   [x] Bilateral levator scapulae  [x] Right C5 (in and out secondary increased pain)    Winding performed every 5 minutes during treatment.       PATIENT EDUCATION AND HOME EXERCISES     Home Exercises Provided and Patient Education Provided     Education provided:   Upper trapezius stretch  Levator scapula stretch  Sternocleidomastoid stretch      Written Home Exercises Provided: yes. Exercises were reviewed and Sana was able to demonstrate them prior to the end of the session.  Sana demonstrated fair  understanding of the education provided. See Electronic Medical Record under Patient Instructions for exercises provided during therapy sessions    ASSESSMENT     Continued with dry needling. Attempted to add lower cervical needle sites. Experienced increased pain/symptom with right C5. Therapist removed the needle. Patient felt pressure higher in cervical area after removed. Applied moist heat to low back during needling secondary increased pain and discomfort with prone positioning. Pillow placed under mid section to reduce lumbar lordosis. Tender to palpation in bilateral upper trapezius, bilateral levator scapulae, bilateral sternocleidomastoid, and bilateral temporalis. Good response to manual therapy today    Sana Is progressing well towards her goals.   Patient prognosis is Good.     Patient will continue to benefit from skilled outpatient physical therapy to address the deficits listed in the problem list box on initial evaluation, provide patient/family education and to maximize patient's level of independence in the home and community environment.     Patient's spiritual, cultural and educational needs considered and Patient agreeable to plan of care and goals.     Anticipated barriers to physical therapy: concussion symptoms,  multiple therapies; multiple body parts involved     Goals:      Short term goals: In 4 weeks,  Goal Status   Patient will be independent with home exercise program to promote improved therapy outcomes.   in progress   2. Patient will perform palloff press with good control to demonstrate improved core strength  in progress   3. Patient will improve bilateral lower extremity Manual Muscle test to 4/5 to demonstrate improved strength for functional tasks including dressing, bathing, grooming, walking, stairs and transitional movements.   in progress   4. Patient will improve shoulder flexion and cervical range of motion by 10 degrees to improve functional mobility including dressing, bathing, grooming, walking, stairs and transitional movements.   in progress   5. Patient will improve TUG test to 15 sec to improve walking speed for functional community ambulation  in progress   6. Patient will improve 5x sit<>stand to 15 to improve functional strength and promote mobility.   in progress   7. Patient will require minimal verbal cueing from PT for proper scapular retraction in order to improve postural awareness.  in progress         Long term goals: In 8 weeks, Goal Status   8. Patient will be independent with progressed home exercise program to self manage symptoms.   in progress   9. Patient will improve bilateral upper extremity/lower extremity Manual Muscle test to 5/5 to improve strength for functional tasks including dressing, bathing, walking, stairs and transitional movements.   in progress   10. Patient will report walking for 30 minutes with <2/10 pain 5x/week to promote healthy lifestyle and regular physical exercise.   in progress   11. Patient will improve Lumbar Focus On therapeutic Outcomes (FOTO) from 46% to 68% to decrease perceived limitation with maintaining/changing body position.   in progress   12. Patient will improve Neck Focus On therapeutic Outcomes (FOTO) from 36% to 59% to decrease  perceived limitation with maintaining/changing body position.   in progress   13. Patient will improve TUG test to 11 sec to improve walking speed for functional community ambulation  in progress   14. Patient will improve bilateral single leg stance to 15 seconds to decrease fall risk and improve ambulation.  in progress   15. Patient will improve 5x sit<>stand to 11 seconds to improve functional strength and promote mobility.   in progress   16. Patient will improve 30 second sit<>stand to 15 reps to improve functional strength and promote mobility.  in progress   17. Patient will lift 10-15 lbs with <2/10 pain to move her plants and perform household chores.   in progress        PLAN     Continue with postural reeducation, neck range of motion, neck strengthening, and dry needling    Plan of care Certification: 8/22/2024 to 11/15/2024.     Outpatient Physical Therapy 1-2 times weekly for 12 weeks to include the following interventions: Gait Training, Manual Therapy, Moist Heat/ Ice, Neuromuscular Re-ed, Patient Education, Therapeutic Activities, and Therapeutic Exercise.   Patient will be seen by a physical therapist minimally every 6th visit or every 30 days.    Alcon Estrada, PT

## 2024-09-17 NOTE — PATIENT INSTRUCTIONS
STRATEGIES FOR COGNITIVE FUNCTIONS  Attention:   Reduce distractions in the area as much as possible.  Look at the person you are talking to   Write down important pieces of information which you may need to reference later  Ask them to repeat if you find yourself not paying attention  Have visual cues to remind you if you need to do something later.  Attention in Conversation  Listen Actively  Eliminate distractions  Ask Questions   Paraphrase  Processing Speed:   Using multiple ways of learning new information- e.g., listening, writing notes, asking questions, recording  Allowing sufficient time to complete tasks will reduce frustration and help to ensure completion.  Executive Functioning:  Dont attempt to multi-task.  Separate tasks so that each of the tasks has separate, designated times  Consider using a calendar/day planner, as that may be effective to help you plan and stay on track.  Color-coding specific tasks by importance may add additional benefit to your planner.  Break down large projects into smaller tasks and write down the steps to completing the task.    Taking notes while reading can help with recall.  Make the environment the best you can (e.g., turning off the TV, reducing background noise, lighting)  Memory  Rehearse - Immediately after seeing/hearing something, try to recall it.  Wait a few minutes, then check again.  Gradually lengthen the intervals between rehearsals.  Repetition -- reciting the information you heard silently or aloud  Write down important information to improve your attention and focus and to have something to look back on when you need to recall it.  Asking for Clarification - asking the person to repeat or rephrase what they said  Visualization - picturing information in your head to help the brain better organize the information  The weirder, funnier, and more elaborate, the better  Use a cue - Symbolic reminders (the proverbial string around the finger) are helpful.   "So too are memos, timers, calendar notes, etc.--keep them in visible, appropriate places.  Get organized - Have fixed locations for all important papers, key phone numbers, medications, keys, wallet, glasses, tools, etc.  Develop routines - Routines can anchor memories so they do not drift away.    Use a Calendar system - Benefits include:  All information is in one place  You rely less on your memory alone  Will help with establishing routines  Will have a record of what you need to to, but also what you have completed  Word Finding:  Try to think of the first letter  Describe it  Think of related words  State the category or topic  Use a similar word    TIPS FOR CONSERVING ENERGY  Sleep Enough  Take Breaks  Exercise  Pace yourself    Be open to help  Avoid interruptions  Cut distractions  Keep it simple     References:VI Whitehead, SAVANNAH Carrion, JAZZ Waterman, MEHREEN Varghese, &amp; BESSIE Vargas (2009, July). Cognitive Symptom Management and Rehabilitation Therapy (CogSMART) for Traumatic Brain Injury.     Twelve Simple Tips to Improve Your Sleep  [email or web address]    Falling asleep may seem like an impossible dream when youre awake at 3 a.m., but good sleep is more under your control than you might think. Following healthy sleep habits can make the difference between restlessness and restful slumber. Researchers have identified a variety of practices and habits--known as sleep hygiene"--that can help anyone maximize the hours they spend sleeping, even those whose sleep is affected by insomnia, jet lag, or shift work.    Sleep hygiene may sound unimaginative, but it just may be the best way to get the sleep you need in this 24/7 age. Here are some simple tips for making the sleep of your dreams a nightly reality:    #1 Avoid Caffeine, Alcohol, Nicotine, and Other Chemicals that Interfere with Sleep  Caffeinated products decrease a persons quality of sleep. As any coffee lover " "knows, caffeine is a stimulant that can keep you awake. So avoid caffeine (found in coffee, tea, chocolate, cola, and some pain relievers) for four to six hours before bedtime. Similarly, smokers should refrain from using tobacco products too close to bedtime.    Although alcohol may help bring on sleep, after a few hours it acts as a stimulant, increasing the number of awakenings and generally decreasing the quality of sleep later in the night. It is therefore best to limit alcohol consumption to one to two drinks per day, or less, and to avoid drinking within three hours of bedtime.    #2 Turn Your Bedroom into a Sleep-Inducing Environment  A quiet, dark, and cool environment can help promote sound slumber. Why do you think bats congregate in caves for their daytime sleep? To achieve such an environment, lower the volume of outside noise with earplugs or a "white noise" appliance. Use heavy curtains, blackout shades, or an eye mask to block light, a powerful cue that tells the brain that it's time to wake up. Keep the temperature comfortably cool--between 60 and 75°F--and the room well ventilated. And make sure your bedroom is equipped with a comfortable mattress and pillows. (Remember that most mattresses wear out after ten years.)    Also, if a pet regularly wakes you during the night, you may want to consider keeping it out of your bedroom.     It may help to limit your bedroom activities to sleep and sex only. Keeping computers, TVs, and work materials out of the room will strengthen the mental association between your bedroom and sleep.    #3 Establish a Soothing Pre-Sleep Routine  Light reading before bed is a good way to prepare yourself for sleep.  Ease the transition from wake time to sleep time with a period of relaxing activities an hour or so before bed. Take a bath (the rise, then fall in body temperature promotes drowsiness), read a book, watch television, or practice relaxation exercises. Avoid " "stressful, stimulating activities--doing work, discussing emotional issues. Physically and psychologically stressful activities can cause the body to secrete the stress hormone cortisol, which is associated with increasing alertness. If you tend to take your problems to bed, try writing them down--and then putting them aside.     #4 Go to Sleep When Youre Truly Tired  Struggling to fall sleep just leads to frustration. If youre not asleep after 20 minutes, get out of bed, go to another room, and do something relaxing, like reading or listening to music until you are tired enough to sleep.    #5 Dont Be a Nighttime Clock-Watcher  Staring at a clock in your bedroom, either when you are trying to fall asleep or when you wake in the middle of the night, can actually increase stress, making it harder to fall asleep. Turn your clocks face away from you.     And if you wake up in the middle of the night and cant get back to sleep in about 20 minutes, get up and engage in a quiet, restful activity such as reading or listening to music. And keep the lights dim; bright light can stimulate your internal clock. When your eyelids are drooping and you are ready to sleep, return to bed.    #6 Use Light to Your Advantage  Natural light keeps your internal clock on a healthy sleep-wake cycle. So let in the light first thing in the morning and get out of the office for a sun break during the day.     #7 Keep Your Internal Clock Set with a Consistent Sleep Schedule  Having a regular sleep schedule helps to ensure better quality and consistent sleep.  Going to bed and waking up at the same time each day sets the bodys "internal clock" to expect sleep at a certain time night after night. Try to stick as closely as possible to your routine on weekends to avoid a Monday morning sleep hangover. Waking up at the same time each day is the very best way to set your clock, and even if you did not sleep well the night before, the extra " sleep drive will help you consolidate sleep the following night. Learn more about the importance of synchronizing the clock in The Drive to Sleep and Our Internal Clock.     #8 Nap Early--Or Not at All  Many people make naps a regular part of their day. However, for those who find falling asleep or staying asleep through the night problematic, afternoon napping may be one of the culprits. This is because late-day naps decrease sleep drive. If you must nap, its better to keep it short and before 5 p.m.    #9 Lighten Up on Evening Meals  Eating a pepperoni pizza at 10 p.m. may be a recipe for insomnia. Finish dinner several hours before bedtime and avoid foods that cause indigestion. If you get hungry at night, snack on foods that (in your experience) won't disturb your sleep, perhaps dairy foods and carbohydrates.    #10 Balance Fluid Intake  Drink enough fluid at night to keep from waking up thirsty--but not so much and so close to bedtime that you will be awakened by the need for a trip to the bathroom.    #11 Exercise Early  Exercise helps promote restful sleep if it is done several hours before you go to bed.  Exercise can help you fall asleep faster and sleep more soundly--as long as it's done at the right time. Exercise stimulates the body to secrete the stress hormone cortisol, which helps activate the alerting mechanism in the brain. This is fine, unless you're trying to fall asleep. Try to finish exercising at least three hours before bed or work out earlier in the day.    #12 Follow Through  Some of these tips will be easier to include in your daily and nightly routine than others. However, if you stick with them, your chances of achieving restful sleep will improve. That said, not all sleep problems are so easily treated and could signify the presence of a sleep disorder such as apnea, restless legs syndrome, narcolepsy, or another clinical sleep problem. If your sleep difficulties dont improve through  good sleep hygiene, you may want to consult your physician or a sleep specialist.

## 2024-09-18 ENCOUNTER — CLINICAL SUPPORT (OUTPATIENT)
Dept: REHABILITATION | Facility: OTHER | Age: 54
End: 2024-09-18
Payer: COMMERCIAL

## 2024-09-18 DIAGNOSIS — M54.50 ACUTE BILATERAL LOW BACK PAIN WITHOUT SCIATICA: ICD-10-CM

## 2024-09-18 DIAGNOSIS — R29.3 POSTURE ABNORMALITY: ICD-10-CM

## 2024-09-18 DIAGNOSIS — Z74.09 IMPAIRED FUNCTIONAL MOBILITY, BALANCE, GAIT, AND ENDURANCE: ICD-10-CM

## 2024-09-18 DIAGNOSIS — M54.2 ACUTE NECK PAIN: Primary | ICD-10-CM

## 2024-09-18 PROCEDURE — 97112 NEUROMUSCULAR REEDUCATION: CPT | Mod: PN

## 2024-09-18 PROCEDURE — 97140 MANUAL THERAPY 1/> REGIONS: CPT | Mod: PN

## 2024-09-18 PROCEDURE — 97110 THERAPEUTIC EXERCISES: CPT | Mod: PN

## 2024-09-18 NOTE — PATIENT INSTRUCTIONS
UPPER TRAP STRETCH - HAND ON HEAD        Begin by retracting your head back into a chin tuck position. Next, move your head towards one side with the help of your hand for light over pressure.     Hold for 30 seconds. Perform 3 repetitions    Copyright © 2024 [employer]    LEVATOR SCAPULAE STRETCH - HOLDING TOP OF HEAD        Tilt your head to the side, then rotate to the side, then tip downward as in looking at your opposite pocket.     Use your hand to pull your head downward and towards the opposite side for a gentle stretch.      You should be looking towards your opposite pocket of the target side.      You should feel a gentle stretch at the side/back of your neck.      Hold for 30 seconds. Perform 3 repetitions    Copyright © 2024 [employer]    SCM/Scalene stretch          To stretch right side:  - Rotate neck right  - Side bend neck left  - Look upward    To stretch Left side:  - Rotate neck left  - Side bend right  - Look upward    Hold for 30 seconds. Perform 3 repetitions    Copyright © 2024 [employer]

## 2024-09-19 ENCOUNTER — CLINICAL SUPPORT (OUTPATIENT)
Dept: REHABILITATION | Facility: HOSPITAL | Age: 54
End: 2024-09-19
Payer: COMMERCIAL

## 2024-09-19 DIAGNOSIS — H81.8X9 DEFICIT OF VESTIBULO-OCULAR REFLEX: Primary | ICD-10-CM

## 2024-09-19 DIAGNOSIS — R68.89 DECREASED FUNCTIONAL ACTIVITY TOLERANCE: ICD-10-CM

## 2024-09-19 DIAGNOSIS — R26.89 BALANCE PROBLEM: ICD-10-CM

## 2024-09-19 DIAGNOSIS — R42 DIZZINESS: ICD-10-CM

## 2024-09-19 PROCEDURE — 97112 NEUROMUSCULAR REEDUCATION: CPT | Mod: PO

## 2024-09-19 PROCEDURE — 97530 THERAPEUTIC ACTIVITIES: CPT | Mod: PO

## 2024-09-19 NOTE — PROGRESS NOTES
"  [facility]   Physical Therapy Treatment Note     Name: Heather Scheuermann [place]  Clinic Number: [id number]    Therapy Diagnosis:   Encounter Diagnoses   Name Primary?    Deficit of vestibulo-ocular reflex Yes    Dizziness     Balance problem     Decreased functional activity tolerance        Physician: Loida Batres MD    Visit Date: 2024    Physician Orders: PT Eval and Treat;   Order comments: Referral for vestibular therapy.   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]   Imbalance [R26.89]  Evaluation Date: 2024  Authorization Period Expiration: 24  Plan of Care Expiration: 10/18/2024  Visit # / Visits authorized:  (3rd vestibular PT visit)    PTA Visit #: 0/5     Time In: 0800   Time Out: 0845  Total Billable Time: 45 minutes    SUBJECTIVE     Pt reports: feeling tired. She reports that dizziness has been alright recently; no major episodes to report.     Response to previous treatment: left eye pain afterwards  Functional change: ongoing    Pain: 0/10  Location: no headache upon arrival    OBJECTIVE     Objective Measures updated at progress report unless specified.     Treatment     Sana received the treatments listed below:      therapeutic exercises to develop strength, endurance, and ROM for 5 minutes includin min walking on treadmill up to 2.2 mph for muscular/CV endurance and neural priming      neuromuscular re-education activities to improve: Balance, Coordination, Sense, and Proprioception for 26 minutes. The following activities were included:     Oculomotor interventions:    Seated:  3 x 3 reps, Pencil push ups using 14 pt "Q" target    3 x 30", VOR x1 horizontal, self-selected pace, Simple "X" - SPT holding target ~3 feet in front of patient except for final trial  3 x 30", VOR x1 horizontal, self-selected pace, Simple "X" - PT " "holding target arm's length away    Balance interventions: standby by assist unless otherwise stated  3 x 30", NBOS, eyes open firm ground, eyes closed  3 x 30", NBOS, eyes closed, airex foam, Contact guard assistance   2 x 30", NBOS, eyes open airex foam, up/down head nods  2 x 30", NBOS, eyes open firm ground, up/down head nods  3 x 30", NBOS, eyes open firm ground, left/right head nods      Sana participated in dynamic functional therapeutic activities to improve functional performance for 14 minutes, including:    Functional motion tolerance:  Walking in hallway:  2 x 100 feet, ambulation + up/down head nods  2 x 100 feet, ambulation + left/right head nods  X 100 feet, Backwards walking   X 100 feet, walking with speed changes      Patient Education and Home Exercises     Home Exercises Provided and Patient Education Provided     Education provided:   8/27: - Visual demonstration, verbal instruction, and written handout provided for interventions included as part of pt's home exercise program   - PT provided education on monitoring her symptoms throughout the day to take appropriate rest breaks and improve her tolerance to functional activities    Written Home Exercises Provided: yes. Exercises were reviewed and Sana was able to demonstrate them prior to the end of the session.  Sana demonstrated good  understanding of the education provided. See EMR under Patient Instructions for exercises provided during therapy sessions    ASSESSMENT     Sana performed today's session fairly well. She remains reluctant with eye exercise, reporting that they make her eyes hurt. She completed all intended interventions, but technique was only fair due to hesitation and distractibility. With dynamic interventions, she demonstrated a slow, guarded gait speed, but no significant unsteadiness. Good stability also noted with static balance interventions. PT continued to allow for self-paced VOR as to not over-exacerbate " her symptoms; her self-selected speed was very slow. PT to continue per established plan of care.     Sana Is progressing well towards her goals.   Pt prognosis is Good.     Pt will continue to benefit from skilled outpatient physical therapy to address the deficits listed in the problem list box on initial evaluation, provide pt/family education and to maximize pt's level of independence in the home and community environment.     Pt's spiritual, cultural and educational needs considered and pt agreeable to plan of care and goals.     Anticipated barriers to physical therapy: apprehension/increased fear of falling     Goals:   Short Term Goals: 4 weeks   Pt will receive an individualized home exercise program. Met 8/27/24  Pt will tolerate performing smooth pursuits at >/= 45 bpm for at least 30 seconds to show improved visual tracking. Ongoing  Pt will tolerate performing saccades at >/= 80 bpm to for at least 30 seconds to show improved visual scanning ability. Ongoing  Pt will tolerate performing VOR x1 at >/= 70 bpm for at least 30 seconds to show improved gaze stabilization and motion tolerance. Ongoing  Pt will improve convergence point to </= 36 cm for improved near point focus. Ongoing  Pt will improve postural control with LA condition 6 score of at least 15 s with minimal sway for decreased fall risk with standing ADLs. Ongoing  PT will perform Functional Gait Assessment and other tests of motion tolerance as indicated to assess pt tolerance to functional activities. Met 8/27/24  PT will assess CRT's as indicated per pt symptoms. Ongoing     Long Term Goals: 8 weeks   Pt will be independent with an individualized home exercise program. Ongoing  Pt will tolerate performing smooth pursuits at >/= 60 bpm for at least 30 seconds to show improved visual tracking. Ongoing  Pt will tolerate performing saccades at >/= 90 bpm to for at least 30 seconds to show improved visual scanning ability. Ongoing  Pt will  tolerate performing VOR x1 at >/= 80 bpm for at least 30 seconds to show improved gaze stabilization and motion tolerance. Ongoing  Pt will improve convergence point to </= 25 cm for improved near point focus. Ongoing  Pt will improve postural control with LA condition 2 score of at least 30 s with minimal sway for decreased fall risk with standing ADLs. Ongoing  Pt will improve postural control with LA condition 4 score of at least 10 s with minimal sway for decreased fall risk with standing ADLs. Ongoing  Pt will improve postural control with LA condition 6 score of at least 20 s with minimal sway for decreased fall risk with standing ADLs. Ongoing  Patient will improve her FOTO score from 48%  to at least 60% for improved self perception of functional mobility.(score 0-100, high score indicates greater level of function) Ongoing  New 8/27/24: Pt will improve Functional Gait Assessment (FGA) score to at least 25/30 for increased independence with home and community ambulation.       PLAN     Continue to progress oculomotor, balance, and motion tolerance interventions as tolerated    Jessica Micky PT

## 2024-09-22 NOTE — PROGRESS NOTES
"See Plan of Care for updated PT recommendations      [facility]   Physical Therapy  Updated POC     Name: Heather Scheuermann Hammond  Clinic Number: [id number]    Therapy Diagnosis:   Encounter Diagnoses   Name Primary?    Deficit of vestibulo-ocular reflex Yes    Dizziness     Balance problem     Decreased functional activity tolerance      Physician: Loida Batres MD    Visit Date: 9/23/2024    Physician Orders: PT Eval and Treat;   Order comments: Referral for vestibular therapy.   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]   Imbalance [R26.89]  Evaluation Date: 8/21/2024  Authorization Period Expiration: 12/31/24  Plan of Care Expiration: 10/18/2024  Updated Plan of Care Expiration: 11/29/2024  Visit # / Visits authorized: 8/20 (3rd vestibular PT visit)    Progress Note Due: 10/23/24    PTA Visit #: 0/5     Time In: 0806   Time Out: 0845  Total Billable Time: 39 minutes    SUBJECTIVE     Pt reports: She is feeling pretty good this morning. Denies any issues at the start of her session.     Response to previous treatment: left eye pain afterwards  Functional change: ongoing    Pain: 0/10  Location: no headache upon arrival    OBJECTIVE     Objective Measures updated on 9/23/24. See below for details.     Tracking/Smooth Pursuits:Impaired: able to perform at 45 bpm but with increased nausea  Saccades: Impaired: able to perform at 80 bpm but with increased nausea and visual fatigue noted  Convergence: impaired 28 cm    VOR 1: Impaired: able to perform at 70 bpm with mild dizziness reported towards end; minimal cervical ROM noted due to apprehension re: dizziness       LA SENSORY ORGANIZATION PERFORMANCE (SOP) TEST:  (N=normal, S = sway, F= Fall; hold each position for 30")  Condition 1: (firm surface/feet together/eyes open) P  Condition 2: (firm surface/feet together/eyes " closed) P - Min-mod sway and increased nause  Condition 3: (firm surface/feet in tandem/eyes open) P - right foot leading  Condition 4: (firm surface/feet in tandem/eyes closed) F - 6 seconds  Condition 5: (soft surface/feet together/eyes open) P  Condition 6: (soft surface/feet together/eyes closed) F - 20 seconds      Functional Gait Assessment:   1. Gait on level surface =  2 (6.99 s)   (3) Normal: less than 5.5 sec, no A.D., no imbalance, normal gait pattern, deviates< 6in   (2) Mild impairment: 7-5.6 sec, uses A.D., mild gait deviations, or deviates 6-10 in   (1) Moderate impairment: > 7 sec, slow speed, imbalance, deviates 10-15 in.   (0) Severe impairment: needs assist, deviates >15 in, reach/touch wall  2. Change in Gait Speed = 3    (3) Normal: smooth change w/o loss of balance or gait deviation, deviates < 6 in, significant difference between speeds   (2) Mild impairment: changes speed, but demonstrates mild gait deviations, deviates 6-10 in, OR no deviations but unable to significantly speed, OR uses A.D.   (1) Moderate impairment: minor changes to speed, OR changes speed w/ significant deviations, deviates 10-15 in, OR  Changes speed , but loses balance & recovers   (0) Severe impairment: cannot change speed, deviates >15 in, or loses balance & needs assist  3. Gait with horizontal head turns  = 3 (mild dizziness reported)   (3) Normal: no change in gait, deviates <6 in   (2) Mild impairment: slight change in speed, deviates 6-10 in, OR uses A.D.   (1) Moderate impairment: moderate change in speed, deviates 10-15 in   (0) Severe impairment: severe disruption of gait, deviates >15in  4. Gait with vertical head turns = 3 (increased nausea   (3) Normal: no change in gait, deviates <6 in   (2) Mild impairment: slight change in speed, deviates 6-10 in OR uses A.D.   (1) Moderate impairment: moderate change in speed, deviates 10-15 in   (0) Severe impairment: severe disruption of gait, deviates >15 in  5. Gait  with pivot turns = 2   (3) Normal: performs safely in 3 sec, no LOB   (2) Mild impairment: performs in >3 sec & no LOB, OR turns safely & requires several steps to regain LOB   (1) Moderate impairment: turns slow, OR requires several small steps for balance following turn & stop   (0) Severe impairment: cannot turn safely, needs assist  6. Step over obstacle = 2   (3) Normal: steps over 2 stacked boxes w/o change in speed or LOB   (2) Mild impairment: able to step over 1 box w/o change in speed or LOB   (1) Moderate impairment: steps over 1 box but must slow down, may require VC   (0) Severe impairment: cannot perform w/o assist  7. Gait with Narrow ANITA = 3   (3) Normal: 10 steps no staggering   (2) Mild impairment: 7-9 steps   (1) Moderate impairment: 4-7 steps   (0) Severe impairment: < 4 steps or cannot perform w/o assist  8. Gait with eyes closed = 1   (3) Normal: < 7 sec, no A.D., no LOB, normal gait pattern, deviates <6 in   (2) Mild impairment: 7.1-9 sec, mild gait deviations, deviates 6-10 in   (1) Moderate impairment: > 9 sec, abnormal pattern, LOB, deviates 10-15 in   (0) Severe impairment: cannot perform w/o assist, LOB, deviates >15in  9. Ambulating Backwards = 2   (3) Normal: no A.D., no LOB, normal gait pattern, deviates <6in   (2) Mild impairment: uses A.D., slower speed, mild gait deviations, deviates 6-10 in   (1) Moderate impairment: slow speed, abnormal gait pattern, LOB, deviates 10-15 in   (0) Severe impairment: severe gait deviations or LOB, deviates >15in  10. Steps = 3   (3) Normal: alternating feet, no rail   (2) Mild Impairment: alternating feet, uses rail   (1) Moderate impairment: step-to, uses rail   (0) Severe impairment: cannot perform safely    Score 24/30     Score:   <22/30 fall risk   <20/30 fall risk in older adults   <18/30 fall risk in Parkinsons       CMS Impairment/Limitation/Restriction for FOTO Vestibular Survey     Therapist reviewed FOTO scores for Heather Scheuermann  Joanna on 9/23/2024.   FOTO documents entered into [employer] - see Media section.     FOTO Score: 48%            Treatment     Sana received the treatments listed below:      therapeutic exercises to develop strength, endurance, and ROM for 00 minutes including:    NP    neuromuscular re-education activities to improve: Balance, Coordination, Sense, and Proprioception for 00 minutes. The following activities were included:     NP      Sana participated in dynamic functional therapeutic activities to improve functional performance for 39 minutes, including:    X 5 mins, SciFit recumbent stepper, hills, peak, level 2.0, B UE/LE for muscular/CV endurance and neural priming    Objective testing listed above  FOTO Survey      Patient Education and Home Exercises     Home Exercises Provided and Patient Education Provided     Education provided:   8/27: - Visual demonstration, verbal instruction, and written handout provided for interventions included as part of pt's home exercise program   - PT provided education on monitoring her symptoms throughout the day to take appropriate rest breaks and improve her tolerance to functional activities    Written Home Exercises Provided: yes. Exercises were reviewed and Sana was able to demonstrate them prior to the end of the session.  Sana demonstrated good  understanding of the education provided. See EMR under Patient Instructions for exercises provided during therapy sessions    ASSESSMENT     Heather Scheuermann Hammond tolerated today's session well with a focus on reassessment of progress towards goals. She was able to tolerate performing the objective testing listed above with improvements noted in her smooth pursuits, saccades, convergence, and VOR x1 however these movements continue to be very provocative of pt's symptoms. Her static and dynamic balance also improved significantly on today but she continues to be challenged by vision eliminated balance and gait  tasks challenging her vestibular system (head turns, vision eliminated, quick turning, etc.). To date, she has met 4/8 short and 1/11 long-term goals established at her initial evaluation and prior progress notes. She remains appropriate for skilled physical therapy services at their current frequency for an additional 6 weeks beyond her established plan of care to continue addressing her functional deficits and goals related to improved motion tolerance and balance with community mobility.     Sana Is progressing well towards her goals.   Pt prognosis is Good.     Pt will continue to benefit from skilled outpatient physical therapy to address the deficits listed in the problem list box on initial evaluation, provide pt/family education and to maximize pt's level of independence in the home and community environment.     Pt's spiritual, cultural and educational needs considered and pt agreeable to plan of care and goals.     Anticipated barriers to physical therapy: apprehension/increased fear of falling     Goals:   Short Term Goals: 4 weeks   Pt will receive an individualized home exercise program. Met 8/27/24  Pt will tolerate performing smooth pursuits at >/= 45 bpm for at least 30 seconds to show improved visual tracking. Progressing  Pt will tolerate performing saccades at >/= 80 bpm to for at least 30 seconds to show improved visual scanning ability. Progressing  Pt will tolerate performing VOR x1 at >/= 70 bpm for at least 30 seconds to show improved gaze stabilization and motion tolerance. Progressing  Pt will improve convergence point to </= 36 cm for improved near point focus. Met 9/23/24  Pt will improve postural control with LA condition 6 score of at least 15 s with minimal sway for decreased fall risk with standing ADLs. Met 9/23/24  PT will perform Functional Gait Assessment and other tests of motion tolerance as indicated to assess pt tolerance to functional activities. Met 8/27/24  PT will  assess CRT's as indicated per pt symptoms. Ongoing     Long Term Goals: 8 weeks   Pt will be independent with an individualized home exercise program. Ongoing  Pt will tolerate performing smooth pursuits at >/= 60 bpm for at least 30 seconds to show improved visual tracking. Progressing  Pt will tolerate performing saccades at >/= 90 bpm to for at least 30 seconds to show improved visual scanning ability. Progressing  Pt will tolerate performing VOR x1 at >/= 80 bpm for at least 30 seconds to show improved gaze stabilization and motion tolerance. Progressing  Pt will improve convergence point to </= 25 cm for improved near point focus. Progressing  Pt will improve postural control with LA condition 2 score of at least 30 s with minimal sway for decreased fall risk with standing ADLs. Ongoing  Pt will improve postural control with LA condition 4 score of at least 10 s with minimal sway for decreased fall risk with standing ADLs. Ongoing  Pt will improve postural control with LA condition 6 score of at least 20 s with minimal sway for decreased fall risk with standing ADLs. Met 9/23/24  Patient will improve her FOTO score from 48%  to at least 60% for improved self perception of functional mobility.(score 0-100, high score indicates greater level of function) Ongoing  Pt will improve Functional Gait Assessment (FGA) score to at least 25/30 for increased independence with home and community ambulation. Progressing  New 9/23/24: Pt will improve postural control with LA condition 6 score of at least 30 s with minimal sway for decreased fall risk with standing ADLs      PLAN     Extend PT POC from 10/18/24 to 11/29/24    Continue to progress oculomotor, balance, and motion tolerance interventions as tolerated    Ruth Rodgers, PT, DPT, NCS

## 2024-09-23 ENCOUNTER — CLINICAL SUPPORT (OUTPATIENT)
Dept: REHABILITATION | Facility: HOSPITAL | Age: 54
End: 2024-09-23
Payer: COMMERCIAL

## 2024-09-23 DIAGNOSIS — R42 DIZZINESS: ICD-10-CM

## 2024-09-23 DIAGNOSIS — R68.89 DECREASED FUNCTIONAL ACTIVITY TOLERANCE: ICD-10-CM

## 2024-09-23 DIAGNOSIS — R26.89 BALANCE PROBLEM: ICD-10-CM

## 2024-09-23 DIAGNOSIS — H81.8X9 DEFICIT OF VESTIBULO-OCULAR REFLEX: Primary | ICD-10-CM

## 2024-09-23 PROCEDURE — 97530 THERAPEUTIC ACTIVITIES: CPT | Mod: PO

## 2024-09-23 NOTE — PLAN OF CARE
"  [facility]   Physical Therapy  Updated POC     Name: Heather Scheuermann Hammond  Clinic Number: [id number]    Therapy Diagnosis:   Encounter Diagnoses   Name Primary?    Deficit of vestibulo-ocular reflex Yes    Dizziness     Balance problem     Decreased functional activity tolerance      Physician: Loida Batres MD    Visit Date: 9/23/2024    Physician Orders: PT Eval and Treat;   Order comments: Referral for vestibular therapy.   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]   Imbalance [R26.89]  Evaluation Date: 8/21/2024  Authorization Period Expiration: 12/31/24  Plan of Care Expiration: 10/18/2024  Updated Plan of Care Expiration: 11/29/2024  Visit # / Visits authorized: 8/20 (3rd vestibular PT visit)    Progress Note Due: 10/23/24    PTA Visit #: 0/5     Time In: 0806   Time Out: 0845  Total Billable Time: 39 minutes    SUBJECTIVE     Pt reports: She is feeling pretty good this morning. Denies any issues at the start of her session.     Response to previous treatment: left eye pain afterwards  Functional change: ongoing    Pain: 0/10  Location: no headache upon arrival    OBJECTIVE     Objective Measures updated on 9/23/24. See below for details.     Tracking/Smooth Pursuits:Impaired: able to perform at 45 bpm but with increased nausea  Saccades: Impaired: able to perform at 80 bpm but with increased nausea and visual fatigue noted  Convergence: impaired 28 cm    VOR 1: Impaired: able to perform at 70 bpm with mild dizziness reported towards end; minimal cervical ROM noted due to apprehension re: dizziness       LA SENSORY ORGANIZATION PERFORMANCE (SOP) TEST:  (N=normal, S = sway, F= Fall; hold each position for 30")  Condition 1: (firm surface/feet together/eyes open) P  Condition 2: (firm surface/feet together/eyes closed) P - Min-mod sway and increased " nause  Condition 3: (firm surface/feet in tandem/eyes open) P - right foot leading  Condition 4: (firm surface/feet in tandem/eyes closed) F - 6 seconds  Condition 5: (soft surface/feet together/eyes open) P  Condition 6: (soft surface/feet together/eyes closed) F - 20 seconds      Functional Gait Assessment:   1. Gait on level surface =  2 (6.99 s)   (3) Normal: less than 5.5 sec, no A.D., no imbalance, normal gait pattern, deviates< 6in   (2) Mild impairment: 7-5.6 sec, uses A.D., mild gait deviations, or deviates 6-10 in   (1) Moderate impairment: > 7 sec, slow speed, imbalance, deviates 10-15 in.   (0) Severe impairment: needs assist, deviates >15 in, reach/touch wall  2. Change in Gait Speed = 3    (3) Normal: smooth change w/o loss of balance or gait deviation, deviates < 6 in, significant difference between speeds   (2) Mild impairment: changes speed, but demonstrates mild gait deviations, deviates 6-10 in, OR no deviations but unable to significantly speed, OR uses A.D.   (1) Moderate impairment: minor changes to speed, OR changes speed w/ significant deviations, deviates 10-15 in, OR  Changes speed , but loses balance & recovers   (0) Severe impairment: cannot change speed, deviates >15 in, or loses balance & needs assist  3. Gait with horizontal head turns  = 3 (mild dizziness reported)   (3) Normal: no change in gait, deviates <6 in   (2) Mild impairment: slight change in speed, deviates 6-10 in, OR uses A.D.   (1) Moderate impairment: moderate change in speed, deviates 10-15 in   (0) Severe impairment: severe disruption of gait, deviates >15in  4. Gait with vertical head turns = 3 (increased nausea   (3) Normal: no change in gait, deviates <6 in   (2) Mild impairment: slight change in speed, deviates 6-10 in OR uses A.D.   (1) Moderate impairment: moderate change in speed, deviates 10-15 in   (0) Severe impairment: severe disruption of gait, deviates >15 in  5. Gait with pivot turns = 2   (3) Normal:  performs safely in 3 sec, no LOB   (2) Mild impairment: performs in >3 sec & no LOB, OR turns safely & requires several steps to regain LOB   (1) Moderate impairment: turns slow, OR requires several small steps for balance following turn & stop   (0) Severe impairment: cannot turn safely, needs assist  6. Step over obstacle = 2   (3) Normal: steps over 2 stacked boxes w/o change in speed or LOB   (2) Mild impairment: able to step over 1 box w/o change in speed or LOB   (1) Moderate impairment: steps over 1 box but must slow down, may require VC   (0) Severe impairment: cannot perform w/o assist  7. Gait with Narrow ANITA = 3   (3) Normal: 10 steps no staggering   (2) Mild impairment: 7-9 steps   (1) Moderate impairment: 4-7 steps   (0) Severe impairment: < 4 steps or cannot perform w/o assist  8. Gait with eyes closed = 1   (3) Normal: < 7 sec, no A.D., no LOB, normal gait pattern, deviates <6 in   (2) Mild impairment: 7.1-9 sec, mild gait deviations, deviates 6-10 in   (1) Moderate impairment: > 9 sec, abnormal pattern, LOB, deviates 10-15 in   (0) Severe impairment: cannot perform w/o assist, LOB, deviates >15in  9. Ambulating Backwards = 2   (3) Normal: no A.D., no LOB, normal gait pattern, deviates <6in   (2) Mild impairment: uses A.D., slower speed, mild gait deviations, deviates 6-10 in   (1) Moderate impairment: slow speed, abnormal gait pattern, LOB, deviates 10-15 in   (0) Severe impairment: severe gait deviations or LOB, deviates >15in  10. Steps = 3   (3) Normal: alternating feet, no rail   (2) Mild Impairment: alternating feet, uses rail   (1) Moderate impairment: step-to, uses rail   (0) Severe impairment: cannot perform safely    Score 24/30     Score:   <22/30 fall risk   <20/30 fall risk in older adults   <18/30 fall risk in Parkinsons       CMS Impairment/Limitation/Restriction for FOTO Vestibular Survey     Therapist reviewed FOTO scores for Heather Scheuermann Hammond on 9/23/2024.   FOTO documents  entered into [facility] - see Media section.     FOTO Score: 48%            Treatment     Sana received the treatments listed below:      therapeutic exercises to develop strength, endurance, and ROM for 00 minutes including:    NP    neuromuscular re-education activities to improve: Balance, Coordination, Sense, and Proprioception for 00 minutes. The following activities were included:     NP      Sana participated in dynamic functional therapeutic activities to improve functional performance for 39 minutes, including:    X 5 mins, SciFit recumbent stepper, hills, peak, level 2.0, B UE/LE for muscular/CV endurance and neural priming    Objective testing listed above  FOTO Survey      Patient Education and Home Exercises     Home Exercises Provided and Patient Education Provided     Education provided:   8/27: - Visual demonstration, verbal instruction, and written handout provided for interventions included as part of pt's home exercise program   - PT provided education on monitoring her symptoms throughout the day to take appropriate rest breaks and improve her tolerance to functional activities    Written Home Exercises Provided: yes. Exercises were reviewed and Sana was able to demonstrate them prior to the end of the session.  Sana demonstrated good  understanding of the education provided. See EMR under Patient Instructions for exercises provided during therapy sessions    ASSESSMENT     Heather Scheuermann Hammond tolerated today's session well with a focus on reassessment of progress towards goals. She was able to tolerate performing the objective testing listed above with improvements noted in her smooth pursuits, saccades, convergence, and VOR x1 however these movements continue to be very provocative of pt's symptoms. Her static and dynamic balance also improved significantly on today but she continues to be challenged by vision eliminated balance and gait tasks challenging her vestibular system  (head turns, vision eliminated, quick turning, etc.). To date, she has met 4/8 short and 1/11 long-term goals established at her initial evaluation and prior progress notes. She remains appropriate for skilled physical therapy services at their current frequency for an additional 6 weeks beyond her established plan of care to continue addressing her functional deficits and goals related to improved motion tolerance and balance with community mobility.     Sana Is progressing well towards her goals.   Pt prognosis is Good.     Pt will continue to benefit from skilled outpatient physical therapy to address the deficits listed in the problem list box on initial evaluation, provide pt/family education and to maximize pt's level of independence in the home and community environment.     Pt's spiritual, cultural and educational needs considered and pt agreeable to plan of care and goals.     Anticipated barriers to physical therapy: apprehension/increased fear of falling     Goals:   Short Term Goals: 4 weeks   Pt will receive an individualized home exercise program. Met 8/27/24  Pt will tolerate performing smooth pursuits at >/= 45 bpm for at least 30 seconds to show improved visual tracking. Progressing  Pt will tolerate performing saccades at >/= 80 bpm to for at least 30 seconds to show improved visual scanning ability. Progressing  Pt will tolerate performing VOR x1 at >/= 70 bpm for at least 30 seconds to show improved gaze stabilization and motion tolerance. Progressing  Pt will improve convergence point to </= 36 cm for improved near point focus. Met 9/23/24  Pt will improve postural control with LA condition 6 score of at least 15 s with minimal sway for decreased fall risk with standing ADLs. Met 9/23/24  PT will perform Functional Gait Assessment and other tests of motion tolerance as indicated to assess pt tolerance to functional activities. Met 8/27/24  PT will assess CRT's as indicated per pt symptoms.  Ongoing     Long Term Goals: 8 weeks   Pt will be independent with an individualized home exercise program. Ongoing  Pt will tolerate performing smooth pursuits at >/= 60 bpm for at least 30 seconds to show improved visual tracking. Progressing  Pt will tolerate performing saccades at >/= 90 bpm to for at least 30 seconds to show improved visual scanning ability. Progressing  Pt will tolerate performing VOR x1 at >/= 80 bpm for at least 30 seconds to show improved gaze stabilization and motion tolerance. Progressing  Pt will improve convergence point to </= 25 cm for improved near point focus. Progressing  Pt will improve postural control with LA condition 2 score of at least 30 s with minimal sway for decreased fall risk with standing ADLs. Ongoing  Pt will improve postural control with LA condition 4 score of at least 10 s with minimal sway for decreased fall risk with standing ADLs. Ongoing  Pt will improve postural control with LA condition 6 score of at least 20 s with minimal sway for decreased fall risk with standing ADLs. Met 9/23/24  Patient will improve her FOTO score from 48%  to at least 60% for improved self perception of functional mobility.(score 0-100, high score indicates greater level of function) Ongoing  Pt will improve Functional Gait Assessment (FGA) score to at least 25/30 for increased independence with home and community ambulation. Progressing  New 9/23/24: Pt will improve postural control with LA condition 6 score of at least 30 s with minimal sway for decreased fall risk with standing ADLs      PLAN     Extend PT POC from 10/18/24 to 11/29/24    Continue to progress oculomotor, balance, and motion tolerance interventions as tolerated    Ruth Rodgers, PT, DPT, NCS

## 2024-09-24 NOTE — PROGRESS NOTES
"[facility]   Physical Therapy Treatment Note     Name: Heather Scheuermann Hammond  Clinic Number: [id number]    Therapy Diagnosis:   Encounter Diagnoses   Name Primary?    Acute neck pain Yes    Acute bilateral low back pain without sciatica     Posture abnormality     Impaired functional mobility, balance, gait, and endurance      Physician: Loida Batres MD; Eunice Reynoso    Visit Date: 9/25/2024    Physician Orders: PT Evaluate and Treat - Cervical retraction scapula stabilization posture training and back and core exercises and home exercise program; Referral for lower neck/upper back to mid back PT with dry needling. No soft tissue manipulation of suboccipital region if you start to feel worse. All joint manipulation is fine.    Medical Diagnosis from Referral: Neck pain (M54.2); Acute midline low back pain without sciatica (M54.50)  Evaluation Date: 8/22/2024  Authorization Period Expiration: 12/31/2024  Plan of Care Certification Period: 8/22/2024 - 10/18/2024  Visit # / Visits authorized: 4/ 20 (+ evaluation)       Progress Note Due: 10/18/2024  FOCUS ON THERAPEUTIC OUTCOMES (FOTO): 9/25/2024 (1/5; 2)  PTA Visit #: 0/5     Precautions: Standard and concussion    Time In: 8:08 am  Time Out: 9:00 am  Total Billable Time: 50 minutes      SUBJECTIVE     Patient reports: therapy has been going fine. Denies any changes since starting therapy. Continues to feel tightness in the neck and shoulders. She gets pins and needles in her fingers especially when she is driving and feels very anxious when she is driving. Denies shoulder pain and reports discomfort is from prior injury which remains unchanged. Reports slight improvement in headaches, dizziness and nausea with neuro PT.     Low back pain seems to have improved - it will come and go now and is better.     She was compliant with home exercise program.  Response to previous treatment: "okay", less tight in the neck  Functional " change: no change reported today    Pain: 10  Location: bilateral neck and low back, headaches     OBJECTIVE     Observation:   Independently ambulates into clinic without assistive device, no observable gait deviations although patient is slow with ambulation, slowed turning in standing and with bed mobility due to dizziness     Range of Motion:      Thoracolumbar Active range of motion Pain/Dysfunction with Movement   Flexion To toes   Walking hands up thighs to return to upright position   Extension 25% limited  Tightness  in shoulders   Right side bending To knee     Left side bending To knee     Right Rotation normal      Left Rotation normal            Cervical active range of motion: Pain/Dysfunction with Movement:   Flexion: 30 degrees  pain   Extension: 25 degrees  pain   Right side bendin degrees  tightness   Left side bendin degrees  tightness   Right rotation: 57 degrees  pain   Left rotation: 60 degrees  Pain          Shoulder flexion: left: 150; right: 170 degrees  Shoulder abduction: equal bilateral   Shoulder external rotation: left: C7 decreased external rotation functionally; right: T1  Shoulder internal rotation: equal bilateral but reports left is tighter than right      Manual Muscle test/Strength:    Right Left Pain/Dysfunction with Movement   Hip Flexion 5/5 4+/5     Hip Extension 3+/5 3+/5     Hip internal rotation 4/5 4/5     Hip external rotation 5/5 5/5     Hip Abduction 4-/5 4-/5           Myotome Right Left Pain/Dysfunction with Movement   C4- Shoulder Elevation 5/5 5/5     C5- Shoulder Abduction 5/5 5/5     Shoulder flexion 4+/5  4+/5     Shoulder extension 5/5 5/5     Shoulder external rotation  4+/5 4+/5     Shoulder internal rotation  5/5 5/5     C6- Wrist Extension 5/5 5/5     Elbow flexion 5/5 5/5     Wrist flexion 5/5 5/5     C7- Elbow Extension 5/5 5/5     C8- Phalangeal Abduction 4/5 4/5     T1- 5th Finger Abduction 4/5 4/5        Ulnar Nerve tension: (+) on right  "  Median nerve Tension: (+) on right but less than ulnar nerve  Radial Nerve tension: (-)     5x sit<>stand: 21 seconds     Normal:   60-69: 11.4 seconds  70-79: 12.6 seconds  80-89: 12.7 seconds     30 seconds sit<>stand: 7 reps     Normal:   Age Male Female   60-64 17 15   65-69 16 15   70-74 15 14   75-79 14 13   80-84 13 12   85-89 11 11   90-94 9 9         Timed Up and Go: 13 seconds     Normal:  Community dwelling adult: >13.5 seconds  Older stroke patients: >14 seconds  Older adults already attending falls clinic: >15 seconds  Frail Elderly: >32.6 seconds  LE amputees >19 seconds  Parkinson's disease: >11.5 seconds  Hip OA: >10 seconds  Vestibular disorders: >11.1 seconds     (Reference: [email or web address]://[email or web address].org/[email or web address]-measures/timed-and-go#older-adults-and-geriatric-care)      Modified Oswestry: 40.0/100 --> 22.0/100 (higher score = greater disability)  NDI: 60.0/100 --> 32.0/100 (higher score = greater disability)     CMS Impairment/Limitation/Restriction for Focus On therapeutic Outcomes (FOTO) Lumbar Spine Survey     Therapist reviewed Focus On therapeutic Outcomes (FOTO) scores for Heather Scheuermann Hammond on 9/25/2024.   Focus On therapeutic Outcomes (FOTO) documents entered into [employer] - see Media section.     Intake Score: 46% --> 57%      Neck Intake score: 36% --> 53%            TREATMENT       Sana received the treatments listed below:      received therapeutic exercises to develop strength and range of motion for 00 minutes including:  [] Pectoral stretch over towel roll x 3 minutes   [] upper body ergometer 3' forward/3' reverse following dry needling  [] levator scapular stretches x 30"  [] upper trapezius stretches x 30"  [] sternocleidomastoid stretches x 30"    received the following manual therapy techniques: Soft tissue Mobilization were applied to the: neck/low back for 00 minutes, including:   []+soft tissue mobilization bilateral sternocleidomastoid, bilateral upper trapezius, " bilateral levator scapulae, bilateral temporalis  []+bilateral 1st rib mobilizations  []+bilateral scapular mobilizations    received the following dynamic functional therapeutic activities to improve functional performance for 50 minutes, including:  [x] Review of home exercise program  [] Education regarding dry needling, review of consent form   [x] Re-assessment, Focus On therapeutic Outcomes (FOTO)    received the following neuromuscular re-education activities to improve: Muscle inhibition, Balance, Coordination, Kinesthetic, Sense, Proprioception, and Posture for  00 minutes. The following activities were included:  [] Chin tuck 20 x 3 second holds  [] Chin tuck + rotation x 10 each   [] Cervical retraction 20 x 3 second holds  [] Scapular retraction 5 second holds over towel roll x 2 minutes  [] Supine horizontal abduction with red band over towel roll 2 x 10   [] Seated no money with red band 2 x 10     [] Row with red theratube 2x 10   [] Shoulder extension with red theratube 2x 10     [] Bridge 2x 10   [] Lower trunk rotation x3 minutes  [] Supine clamshells 3-ways x20 each   [] Standing hip abduction 2x 10 bilateral   [] Standing hip extension 2x 10 bilateral       [] Application of TDN: Pt educated on benefits and potential side effects of dry needling. Educated pt on benefits, precautions, side effects following TDN. Educated pt to use heat following treatment sessions if pt is experiencing pain or soreness. Pt verbalized good understanding of education.  Pt signed written consent to dry needling. Pt gave verbal consent for DN    Pt received dry needling to the below listed muscles using 30 mm needles.  [x] Bilateral upper trapezius   [x] Bilateral levator scapulae  [x] Right C5 (in and out secondary increased pain)    Winding performed every 5 minutes during treatment.       PATIENT EDUCATION AND HOME EXERCISES     Home Exercises Provided and Patient Education Provided     Education provided:    Reaching out to psychologist/ regarding fear and increased tension when driving/getting into a car.   Progress with re-assessment  Resuming treatments including soft tissue  Updated home exercise program with median and ulnar nerve glides     Written Home Exercises Provided: yes. Exercises were reviewed and Sana was able to demonstrate them prior to the end of the session.  Snaa demonstrated fair  understanding of the education provided. See Electronic Medical Record under Patient Instructions for exercises provided during therapy sessions    ASSESSMENT     At monthly re-assessment, patient notes minimal subjective improvements although Focus On therapeutic Outcomes (FOTO) score reflects an improvement in both neck and lumbar spine abilities. Improved objective findings including sit to stand, Timed Up and Go, cervical range of motion and strength. Main concern orthopaedically is the tightness in the neck and shoulders. Discussed reaching out to talk with someone regarding tension and stress related to driving and fear of getting into another accident which may be contributing to symptoms. Continue with soft tissue mobilization and dry needling to reduce tension and discomfort and improve driving and household activities.     Sana Is progressing well towards her goals.   Patient prognosis is Good.     Patient will continue to benefit from skilled outpatient physical therapy to address the deficits listed in the problem list box on initial evaluation, provide patient/family education and to maximize patient's level of independence in the home and community environment.     Patient's spiritual, cultural and educational needs considered and Patient agreeable to plan of care and goals.     Anticipated barriers to physical therapy: concussion symptoms, multiple therapies; multiple body parts involved     Goals:      Short term goals: In 4 weeks,  Goal Status   Patient will be independent with home  exercise program to promote improved therapy outcomes.  MET   2. Patient will perform palloff press with good control to demonstrate improved core strength  in progress   3. Patient will improve bilateral lower extremity Manual Muscle test to 4/5 to demonstrate improved strength for functional tasks including dressing, bathing, grooming, walking, stairs and transitional movements.   in progress   4. Patient will improve shoulder flexion and cervical range of motion by 10 degrees to improve functional mobility including dressing, bathing, grooming, walking, stairs and transitional movements.   in progress   5. Patient will improve TUG test to 15 sec to improve walking speed for functional community ambulation MET   6. Patient will improve 5x sit<>stand to 15 to improve functional strength and promote mobility.  In progress   7. Patient will require minimal verbal cueing from PT for proper scapular retraction in order to improve postural awareness.  in progress         Long term goals: In 8 weeks, Goal Status   8. Patient will be independent with progressed home exercise program to self manage symptoms.   in progress   9. Patient will improve bilateral upper extremity/lower extremity Manual Muscle test to 5/5 to improve strength for functional tasks including dressing, bathing, walking, stairs and transitional movements.   in progress   10. Patient will report walking for 30 minutes with <2/10 pain 5x/week to promote healthy lifestyle and regular physical exercise.   in progress   11. Patient will improve Lumbar Focus On therapeutic Outcomes (FOTO) from 46% to 68% to decrease perceived limitation with maintaining/changing body position.   in progress   12. Patient will improve Neck Focus On therapeutic Outcomes (FOTO) from 36% to 59% to decrease perceived limitation with maintaining/changing body position.   in progress   13. Patient will improve TUG test to 11 sec to improve walking speed for functional community  ambulation  in progress   14. Patient will improve bilateral single leg stance to 15 seconds to decrease fall risk and improve ambulation.  in progress   15. Patient will improve 5x sit<>stand to 11 seconds to improve functional strength and promote mobility.   in progress   16. Patient will improve 30 second sit<>stand to 15 reps to improve functional strength and promote mobility.  in progress   17. Patient will lift 10-15 lbs with <2/10 pain to move her plants and perform household chores.   in progress        PLAN     Continue with postural reeducation, neck range of motion, neck strengthening, and dry needling    Plan of care Certification: 8/22/2024 to 11/15/2024.     Outpatient Physical Therapy 1-2 times weekly for 12 weeks to include the following interventions: Gait Training, Manual Therapy, Moist Heat/ Ice, Neuromuscular Re-ed, Patient Education, Therapeutic Activities, and Therapeutic Exercise.   Patient will be seen by a physical therapist minimally every 6th visit or every 30 days.    Christen Guadalupe, PT

## 2024-09-25 ENCOUNTER — CLINICAL SUPPORT (OUTPATIENT)
Dept: REHABILITATION | Facility: OTHER | Age: 54
End: 2024-09-25
Payer: COMMERCIAL

## 2024-09-25 DIAGNOSIS — Z74.09 IMPAIRED FUNCTIONAL MOBILITY, BALANCE, GAIT, AND ENDURANCE: ICD-10-CM

## 2024-09-25 DIAGNOSIS — M54.50 ACUTE BILATERAL LOW BACK PAIN WITHOUT SCIATICA: ICD-10-CM

## 2024-09-25 DIAGNOSIS — R29.3 POSTURE ABNORMALITY: ICD-10-CM

## 2024-09-25 DIAGNOSIS — M54.2 ACUTE NECK PAIN: Primary | ICD-10-CM

## 2024-09-25 PROCEDURE — 97530 THERAPEUTIC ACTIVITIES: CPT | Mod: PN

## 2024-09-25 NOTE — PATIENT INSTRUCTIONS
Access Code: [email or web address]  URL: [email or web address]  Date: 09/25/2024  Prepared by: Christen Guadalupe    Exercises  - Seated Scapular Retraction  - 1 x daily - 1 sets - 20 reps - 5 second hold  - Supine Chin Tuck  - 1 x daily - 1 sets - 20 reps - 5 hold  - Supine Cervical Retraction with Towel  - 1 x daily - 1 sets - 20 reps - 3 seconds hold  - No Money  - 1 x daily - 2 sets - 20 reps - 3 second hold  - Shoulder extension with resistance - Neutral  - 1 x daily - 1 sets - 30 reps  - Standing Shoulder Row with Anchored Resistance  - 1 x daily - 3 sets - 10 reps  - Supine Bridge  - 1 x daily - 2 sets - 10 reps - 3 hold  - Hooklying Clamshell with Resistance  - 1 x daily - 3-5 x weekly - 2 sets - 10 reps  - Supine Lower Trunk Rotation  - 1 x daily - 1 sets - 20 reps - 3 hold  - Standing Hip Abduction  - 1 x daily - 3-5 x weekly - 2 sets - 10 reps  - Standing Hip Extension  - 1 x daily - 2 sets - 10 reps  - Standing Ulnar Nerve Glide  - 1 x daily - 2 sets - 15 reps - 3 hold  - Standing Median Nerve Glide  - 1 x daily - 2 sets - 15 reps - 3 hold

## 2024-09-26 ENCOUNTER — CLINICAL SUPPORT (OUTPATIENT)
Dept: REHABILITATION | Facility: HOSPITAL | Age: 54
End: 2024-09-26
Payer: COMMERCIAL

## 2024-09-26 DIAGNOSIS — R42 DIZZINESS: ICD-10-CM

## 2024-09-26 DIAGNOSIS — R68.89 DECREASED FUNCTIONAL ACTIVITY TOLERANCE: ICD-10-CM

## 2024-09-26 DIAGNOSIS — H81.8X9 DEFICIT OF VESTIBULO-OCULAR REFLEX: Primary | ICD-10-CM

## 2024-09-26 DIAGNOSIS — R26.89 BALANCE PROBLEM: ICD-10-CM

## 2024-09-26 PROCEDURE — 97530 THERAPEUTIC ACTIVITIES: CPT | Mod: PO

## 2024-09-26 PROCEDURE — 97112 NEUROMUSCULAR REEDUCATION: CPT | Mod: PO

## 2024-09-26 NOTE — PROGRESS NOTES
"[facility]   Physical Therapy  Updated POC     Name: Heather Scheuermann Hammond  Clinic Number: [id number]    Therapy Diagnosis:   Encounter Diagnoses   Name Primary?    Deficit of vestibulo-ocular reflex Yes    Dizziness     Balance problem     Decreased functional activity tolerance        Physician: Loida Batres MD    Visit Date: 9/26/2024    Physician Orders: PT Eval and Treat;   Order comments: Referral for vestibular therapy.   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]   Imbalance [R26.89]  Evaluation Date: 8/21/2024  Authorization Period Expiration: 12/31/24  Plan of Care Expiration: 10/18/2024  Updated Plan of Care Expiration: 11/29/2024  Visit # / Visits authorized: 10/20 (4th vestibular PT visit)    Progress Note Due: 10/23/24    PTA Visit #: 0/5     Time In: 0811  Time Out: 0845  Total Billable Time: 34 minutes    SUBJECTIVE     Pt reports: that she has been having R jaw issues    Response to previous treatment: left eye pain afterwards  Functional change: ongoing    Pain: 0/10  Location: no headache upon arrival    OBJECTIVE     Objective Measures updated on 9/23/24. See below for details.     Treatment     Sana received the treatments listed below:      Sana received therapeutic exercises to develop endurance for 0 minutes including:    Sana participated in neuromuscular re-education activities to improve: Balance and motion tolerance for 24 minutes. The following activities were included:    Oculomotor and Gaze Stabilization Adaptation Training:    Gaze shifts: Simple "X" target  3 x 30s, Horizontal   3 x 30s, Vertical     VORx1: Simple "X" target  2 x 20s, Horizontal, Self-selected speed  2 x 20s, Vertical, Self-selected speed    Balance Training: standby by assist     Airex: Feet together  3 x 30s, Eyes closed    2 x 30s with each LE back, Tandem " stance    Sana participated in dynamic functional therapeutic activities to improve functional performance for 8 minutes, including:    X 5 mins, SciFit recumbent stepper, hills, peak, level 2.0, B UE/LE for muscular/CV endurance and neural priming    Functional Motion Tolerance:    Hallway ambulation: standby by assist   2 x 100 feet, Horizontal head turns  2 x 100 feet, Vertical head turns        Patient Education and Home Exercises     Home Exercises Provided and Patient Education Provided     Education provided:   8/27: - Visual demonstration, verbal instruction, and written handout provided for interventions included as part of pt's home exercise program   - PT provided education on monitoring her symptoms throughout the day to take appropriate rest breaks and improve her tolerance to functional activities    Written Home Exercises Provided: yes. Exercises were reviewed and Sana was able to demonstrate them prior to the end of the session.  Sana demonstrated good  understanding of the education provided. See EMR under Patient Instructions for exercises provided during therapy sessions    ASSESSMENT   Sana performed today's session well. She reports motion sickness with any intervention involving head turns. She continues to perform VOR interventions at a very slow speed. Good balance noted today; she performed vision-eliminated balance on foam pad with only standby by assist today. PT to continue per established POC, emphasizing oculomotor, balance, and motion tolerance training.     Sana Is progressing well towards her goals.   Pt prognosis is Good.     Pt will continue to benefit from skilled outpatient physical therapy to address the deficits listed in the problem list box on initial evaluation, provide pt/family education and to maximize pt's level of independence in the home and community environment.     Pt's spiritual, cultural and educational needs considered and pt agreeable to plan of  care and goals.     Anticipated barriers to physical therapy: apprehension/increased fear of falling     Goals:   Short Term Goals: 4 weeks   Pt will receive an individualized home exercise program. Met 8/27/24  Pt will tolerate performing smooth pursuits at >/= 45 bpm for at least 30 seconds to show improved visual tracking. Progressing  Pt will tolerate performing saccades at >/= 80 bpm to for at least 30 seconds to show improved visual scanning ability. Progressing  Pt will tolerate performing VOR x1 at >/= 70 bpm for at least 30 seconds to show improved gaze stabilization and motion tolerance. Progressing  Pt will improve convergence point to </= 36 cm for improved near point focus. Met 9/23/24  Pt will improve postural control with LA condition 6 score of at least 15 s with minimal sway for decreased fall risk with standing ADLs. Met 9/23/24  PT will perform Functional Gait Assessment and other tests of motion tolerance as indicated to assess pt tolerance to functional activities. Met 8/27/24  PT will assess CRT's as indicated per pt symptoms. Ongoing     Long Term Goals: 8 weeks   Pt will be independent with an individualized home exercise program. Ongoing  Pt will tolerate performing smooth pursuits at >/= 60 bpm for at least 30 seconds to show improved visual tracking. Progressing  Pt will tolerate performing saccades at >/= 90 bpm to for at least 30 seconds to show improved visual scanning ability. Progressing  Pt will tolerate performing VOR x1 at >/= 80 bpm for at least 30 seconds to show improved gaze stabilization and motion tolerance. Progressing  Pt will improve convergence point to </= 25 cm for improved near point focus. Progressing  Pt will improve postural control with LA condition 2 score of at least 30 s with minimal sway for decreased fall risk with standing ADLs. Ongoing  Pt will improve postural control with LA condition 4 score of at least 10 s with minimal sway for decreased fall risk  with standing ADLs. Ongoing  Pt will improve postural control with LA condition 6 score of at least 20 s with minimal sway for decreased fall risk with standing ADLs. Met 9/23/24  Patient will improve her FOTO score from 48%  to at least 60% for improved self perception of functional mobility.(score 0-100, high score indicates greater level of function) Ongoing  Pt will improve Functional Gait Assessment (FGA) score to at least 25/30 for increased independence with home and community ambulation. Progressing  New 9/23/24: Pt will improve postural control with LA condition 6 score of at least 30 s with minimal sway for decreased fall risk with standing ADLs      PLAN     Extend PT POC from 10/18/24 to 11/29/24    Continue to progress oculomotor, balance, and motion tolerance interventions as tolerated    Jessica Micky PT

## 2024-09-30 ENCOUNTER — CLINICAL SUPPORT (OUTPATIENT)
Dept: REHABILITATION | Facility: HOSPITAL | Age: 54
End: 2024-09-30
Payer: COMMERCIAL

## 2024-09-30 DIAGNOSIS — R68.89 DECREASED FUNCTIONAL ACTIVITY TOLERANCE: ICD-10-CM

## 2024-09-30 DIAGNOSIS — R26.89 BALANCE PROBLEM: ICD-10-CM

## 2024-09-30 DIAGNOSIS — R42 DIZZINESS: ICD-10-CM

## 2024-09-30 DIAGNOSIS — H81.8X9 DEFICIT OF VESTIBULO-OCULAR REFLEX: Primary | ICD-10-CM

## 2024-09-30 PROCEDURE — 97112 NEUROMUSCULAR REEDUCATION: CPT | Mod: PO

## 2024-09-30 PROCEDURE — 97530 THERAPEUTIC ACTIVITIES: CPT | Mod: PO

## 2024-09-30 NOTE — PROGRESS NOTES
"  [facility]   Physical Therapy  Updated POC     Name: Heather Scheuermann Hammond  Clinic Number: [id number]    Therapy Diagnosis:   Encounter Diagnoses   Name Primary?    Deficit of vestibulo-ocular reflex Yes    Dizziness     Balance problem     Decreased functional activity tolerance      Physician: Loida Batres MD    Visit Date: 9/30/2024    Physician Orders: PT Eval and Treat;   Order comments: Referral for vestibular therapy.   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 Also reports pain in the right side of her jaw with tenderness around her TMJ. PT and pt briefly discussed potential correlation with headaches and dry needling vs dentist management of pain.    Response to previous treatment: tolerated well  Functional change: ongoing    Pain: 7/10  Location: headache    OBJECTIVE     Objective Measures updated on 9/23/24. See below for details.     Treatment     Sana received the treatments listed below:      Sana received therapeutic exercises to develop endurance for 00 minutes including:    NP      Sana participated in neuromuscular re-education activities to improve: Balance and motion tolerance for 29 minutes. The following activities were included:    Oculomotor and Gaze Stabilization Adaptation Training:  Gaze shifts: Simple "X" target  3 x 20s, Horizontal, self-selected speed  3 x 20s, Vertical, self-selected " "speed    VORx1: Simple "X" target  2 x 20s, Horizontal, Self-selected speed  2 x 20s, Vertical, Self-selected speed    Balance Training: standby by assist     Foam fitter board: SBA  X 30", NBOS, eyes open  3 x 30", NBOS, eyes closed    Firm ground:  2 x 30" B, Tandem stance    --> Total activity time includes rest breaks as needed between sets/tasks       Sana participated in dynamic functional therapeutic activities to improve functional performance for 14 minutes, including:    Functional Motion Tolerance:    Treadmill training:  X 8 minutes, 1.5 mph, B UE support, emergency clip donned for safety    Hallway ambulation: standby by assist   2 x 100 feet, Horizontal head turns  2 x 100 feet, Vertical head turns    --> Total activity time includes rest breaks as needed between sets/tasks       Patient Education and Home Exercises     Home Exercises Provided and Patient Education Provided     Education provided:   8/27: - Visual demonstration, verbal instruction, and written handout provided for interventions included as part of pt's home exercise program   - PT provided education on monitoring her symptoms throughout the day to take appropriate rest breaks and improve her tolerance to functional activities    Written Home Exercises Provided: yes. Exercises were reviewed and Sana was able to demonstrate them prior to the end of the session.  Sana demonstrated good  understanding of the education provided. See EMR under Patient Instructions for exercises provided during therapy sessions    ASSESSMENT   Sana tolerated today's session fairly well. She continues to be moderately challenged by oculomotor interventions and requires extended rest breaks to return to her baseline; vertical exercises were more provocative than horizontal. She showed improved tolerance to habituation tasks on today with minimal dizziness reported; may benefit from progression as able. Also plan to continue to progress balance " interventions as pt performed well and with no reports of increased symptoms today. She continues to be appropriate for skilled physical therapy services to emphasizing oculomotor, balance, and motion tolerance training.     Sana Is progressing well towards her goals.   Pt prognosis is Good.     Pt will continue to benefit from skilled outpatient physical therapy to address the deficits listed in the problem list box on initial evaluation, provide pt/family education and to maximize pt's level of independence in the home and community environment.     Pt's spiritual, cultural and educational needs considered and pt agreeable to plan of care and goals.     Anticipated barriers to physical therapy: apprehension/increased fear of falling     Goals:   Short Term Goals: 4 weeks   Pt will receive an individualized home exercise program. Met 8/27/24  Pt will tolerate performing smooth pursuits at >/= 45 bpm for at least 30 seconds to show improved visual tracking. Progressing  Pt will tolerate performing saccades at >/= 80 bpm to for at least 30 seconds to show improved visual scanning ability. Progressing  Pt will tolerate performing VOR x1 at >/= 70 bpm for at least 30 seconds to show improved gaze stabilization and motion tolerance. Progressing  Pt will improve convergence point to </= 36 cm for improved near point focus. Met 9/23/24  Pt will improve postural control with LA condition 6 score of at least 15 s with minimal sway for decreased fall risk with standing ADLs. Met 9/23/24  PT will perform Functional Gait Assessment and other tests of motion tolerance as indicated to assess pt tolerance to functional activities. Met 8/27/24  PT will assess CRT's as indicated per pt symptoms. Ongoing     Long Term Goals: 8 weeks   Pt will be independent with an individualized home exercise program. Ongoing  Pt will tolerate performing smooth pursuits at >/= 60 bpm for at least 30 seconds to show improved visual tracking.  Progressing  Pt will tolerate performing saccades at >/= 90 bpm to for at least 30 seconds to show improved visual scanning ability. Progressing  Pt will tolerate performing VOR x1 at >/= 80 bpm for at least 30 seconds to show improved gaze stabilization and motion tolerance. Progressing  Pt will improve convergence point to </= 25 cm for improved near point focus. Progressing  Pt will improve postural control with LA condition 2 score of at least 30 s with minimal sway for decreased fall risk with standing ADLs. Ongoing  Pt will improve postural control with LA condition 4 score of at least 10 s with minimal sway for decreased fall risk with standing ADLs. Ongoing  Pt will improve postural control with LA condition 6 score of at least 20 s with minimal sway for decreased fall risk with standing ADLs. Met 9/23/24  Patient will improve her FOTO score from 48%  to at least 60% for improved self perception of functional mobility.(score 0-100, high score indicates greater level of function) Ongoing  Pt will improve Functional Gait Assessment (FGA) score to at least 25/30 for increased independence with home and community ambulation. Progressing  New 9/23/24: Pt will improve postural control with LA condition 6 score of at least 30 s with minimal sway for decreased fall risk with standing ADLs      PLAN     Extend PT POC from 10/18/24 to 11/29/24    Continue to progress oculomotor, balance, and motion tolerance interventions as tolerated    Ruth Rodgers, PT

## 2024-09-30 NOTE — PROGRESS NOTES
"[facility]   Physical Therapy Treatment Note     Name: Heather Scheuermann Hammond  Clinic Number: [id number]    Therapy Diagnosis:   Encounter Diagnoses   Name Primary?    Acute neck pain Yes    Acute bilateral low back pain without sciatica     Posture abnormality     Impaired functional mobility, balance, gait, and endurance        Physician: Loida Batres MD; Eunice Reynoso    Visit Date: 10/1/2024    Physician Orders: PT Evaluate and Treat - Cervical retraction scapula stabilization posture training and back and core exercises and home exercise program; Referral for lower neck/upper back to mid back PT with dry needling. No soft tissue manipulation of suboccipital region if you start to feel worse. All joint manipulation is fine.    Medical Diagnosis from Referral: Neck pain (M54.2); Acute midline low back pain without sciatica (M54.50)  Evaluation Date: 8/22/2024  Authorization Period Expiration: 12/31/2024  Plan of Care Certification Period: 8/22/2024 - 10/18/2024  Visit # / Visits authorized: 5/ 20 (+ evaluation)       Progress Note Due: 10/18/2024  FOCUS ON THERAPEUTIC OUTCOMES (FOTO): 9/25/2024 (2/5; 2)  PTA Visit #: 0/5     Precautions: Standard and concussion    Time In: 0814  Time Out: 0900  Total Billable Time: 46 minutes      SUBJECTIVE     Patient reports: states she may need to go to the dentist because she can press on her jaw near her ear and cause shooting pain into her head.    Low back pain seems to have improved - it will come and go now and is better.     She was compliant with home exercise program.  Response to previous treatment: "okay", less tight in the neck  Functional change: no change reported today    Pain: 7/10  Location: bilateral neck and low back, headaches     OBJECTIVE     9/25/2024    Observation:   Independently ambulates into clinic without assistive device, no observable gait deviations although patient is slow with ambulation, slowed " turning in standing and with bed mobility due to dizziness     Range of Motion:      Thoracolumbar Active range of motion Pain/Dysfunction with Movement   Flexion To toes   Walking hands up thighs to return to upright position   Extension 25% limited  Tightness  in shoulders   Right side bending To knee     Left side bending To knee     Right Rotation normal      Left Rotation normal            Cervical active range of motion: Pain/Dysfunction with Movement:   Flexion: 30 degrees  pain   Extension: 25 degrees  pain   Right side bendin degrees  tightness   Left side bendin degrees  tightness   Right rotation: 57 degrees  pain   Left rotation: 60 degrees  Pain          Shoulder flexion: left: 150; right: 170 degrees  Shoulder abduction: equal bilateral   Shoulder external rotation: left: C7 decreased external rotation functionally; right: T1  Shoulder internal rotation: equal bilateral but reports left is tighter than right      Manual Muscle test/Strength:    Right Left Pain/Dysfunction with Movement   Hip Flexion 5/5 4+/5     Hip Extension 3+/5 3+/5     Hip internal rotation 4/5 4/5     Hip external rotation 5/5 5/5     Hip Abduction 4-/5 4-/5           Myotome Right Left Pain/Dysfunction with Movement   C4- Shoulder Elevation 5/5 5/5     C5- Shoulder Abduction 5/5 5/5     Shoulder flexion 4+/5  4+/5     Shoulder extension 5/5 5/5     Shoulder external rotation  4+/5 4+/5     Shoulder internal rotation  5/5 5/5     C6- Wrist Extension 5/5 5/5     Elbow flexion 5/5 5/5     Wrist flexion 5/5 5/5     C7- Elbow Extension 5/5 5/5     C8- Phalangeal Abduction 4/5 4/5     T1- 5th Finger Abduction 4/5 4/5        Ulnar Nerve tension: (+) on right   Median nerve Tension: (+) on right but less than ulnar nerve  Radial Nerve tension: (-)     5x sit<>stand: 21 seconds     Normal:   60-69: 11.4 seconds  70-79: 12.6 seconds  80-89: 12.7 seconds     30 seconds sit<>stand: 7 reps     Normal:   Age Male Female   60-64 17 15  "  65-69 16 15   70-74 15 14   75-79 14 13   80-84 13 12   85-89 11 11   90-94 9 9         Timed Up and Go: 13 seconds     Normal:  Community dwelling adult: >13.5 seconds  Older stroke patients: >14 seconds  Older adults already attending falls clinic: >15 seconds  Frail Elderly: >32.6 seconds  LE amputees >19 seconds  Parkinson's disease: >11.5 seconds  Hip OA: >10 seconds  Vestibular disorders: >11.1 seconds     (Reference: [email or web address]://[email or web address].org/[email or web address]-measures/timed-and-go#older-adults-and-geriatric-care)      Modified Oswestry: 40.0/100 --> 22.0/100 (higher score = greater disability)  NDI: 60.0/100 --> 32.0/100 (higher score = greater disability)     CMS Impairment/Limitation/Restriction for Focus On therapeutic Outcomes (FOTO) Lumbar Spine Survey     Therapist reviewed Focus On therapeutic Outcomes (FOTO) scores for Heather Scheuermann Hammond on 9/25/2024.   Focus On therapeutic Outcomes (FOTO) documents entered into [employer] - see Media section.     Intake Score: 46% --> 57%      Neck Intake score: 36% --> 53%            TREATMENT       Sana received the treatments listed below:      received therapeutic exercises to develop strength and range of motion for 00 minutes including:  [] Pectoral stretch over towel roll x 3 minutes   [] upper body ergometer 3' forward/3' reverse following dry needling  [] levator scapular stretches x 30"  [] upper trapezius stretches x 30"  [] sternocleidomastoid stretches x 30"    received the following manual therapy techniques: Soft tissue Mobilization were applied to the: neck/low back for 21 minutes, including:   [x] soft tissue mobilization bilateral sternocleidomastoid, bilateral upper trapezius, bilateral levator scapulae, bilateral temporalis  [x] bilateral 1st rib mobilizations  [x] bilateral scapular mobilizations    received the following dynamic functional therapeutic activities to improve functional performance for 50 minutes, including:  [] Review of home " exercise program  [] Education regarding dry needling, review of consent form   [] Re-assessment, Focus On therapeutic Outcomes (FOTO)    received the following neuromuscular re-education activities to improve: Muscle inhibition, Balance, Coordination, Kinesthetic, Sense, Proprioception, and Posture for  25 minutes. The following activities were included:  [] Chin tuck 20 x 3 second holds  [] Chin tuck + rotation x 10 each   [] Cervical retraction 20 x 3 second holds  [] Scapular retraction 5 second holds over towel roll x 2 minutes  [] Supine horizontal abduction with red band over towel roll 2 x 10   [] Seated no money with red band 2 x 10     [] Row with red theratube 2x 10   [] Shoulder extension with red theratube 2x 10     [] Bridge 2x 10   [] Lower trunk rotation x3 minutes  [] Supine clamshells 3-ways x20 each   [] Standing hip abduction 2x 10 bilateral   [] Standing hip extension 2x 10 bilateral       [x] Application of TDN: Pt educated on benefits and potential side effects of dry needling. Educated pt on benefits, precautions, side effects following TDN. Educated pt to use heat following treatment sessions if pt is experiencing pain or soreness. Pt verbalized good understanding of education.  Pt signed written consent to dry needling. Pt gave verbal consent for DN    Pt received dry needling to the below listed muscles using 15 mm and 30 mm needles.  [x] Bilateral upper trapezius   [x] Bilateral levator scapulae  [] Right C5 (in and out secondary increased pain)  [x] Bilateral temporalis    Winding performed every 5 minutes during treatment.       PATIENT EDUCATION AND HOME EXERCISES     Home Exercises Provided and Patient Education Provided     Education provided:   Reaching out to psychologist/ regarding fear and increased tension when driving/getting into a car.   Progress with re-assessment  Resuming treatments including soft tissue  Updated home exercise program with median and ulnar  nerve glides     Written Home Exercises Provided: yes. Exercises were reviewed and Sana was able to demonstrate them prior to the end of the session.  Sana demonstrated fair  understanding of the education provided. See Electronic Medical Record under Patient Instructions for exercises provided during therapy sessions    ASSESSMENT     Patient experiencing increased right jaw pain, that radiates into right temporalis. Increased tension in bilateral sternocleidomastoid may be contributing to this. Continued with dry needling today and manual therapy. Experienced reduction in pain following session. Will continue with postural reeducation exercises and neck strengthening exercises.    Sana Is progressing well towards her goals.   Patient prognosis is Good.     Patient will continue to benefit from skilled outpatient physical therapy to address the deficits listed in the problem list box on initial evaluation, provide patient/family education and to maximize patient's level of independence in the home and community environment.     Patient's spiritual, cultural and educational needs considered and Patient agreeable to plan of care and goals.     Anticipated barriers to physical therapy: concussion symptoms, multiple therapies; multiple body parts involved     Goals:      Short term goals: In 4 weeks,  Goal Status   Patient will be independent with home exercise program to promote improved therapy outcomes.  MET   2. Patient will perform palloff press with good control to demonstrate improved core strength  in progress   3. Patient will improve bilateral lower extremity Manual Muscle test to 4/5 to demonstrate improved strength for functional tasks including dressing, bathing, grooming, walking, stairs and transitional movements.   in progress   4. Patient will improve shoulder flexion and cervical range of motion by 10 degrees to improve functional mobility including dressing, bathing, grooming, walking,  stairs and transitional movements.   in progress   5. Patient will improve TUG test to 15 sec to improve walking speed for functional community ambulation MET   6. Patient will improve 5x sit<>stand to 15 to improve functional strength and promote mobility.  In progress   7. Patient will require minimal verbal cueing from PT for proper scapular retraction in order to improve postural awareness.  in progress         Long term goals: In 8 weeks, Goal Status   8. Patient will be independent with progressed home exercise program to self manage symptoms.   in progress   9. Patient will improve bilateral upper extremity/lower extremity Manual Muscle test to 5/5 to improve strength for functional tasks including dressing, bathing, walking, stairs and transitional movements.   in progress   10. Patient will report walking for 30 minutes with <2/10 pain 5x/week to promote healthy lifestyle and regular physical exercise.   in progress   11. Patient will improve Lumbar Focus On therapeutic Outcomes (FOTO) from 46% to 68% to decrease perceived limitation with maintaining/changing body position.   in progress   12. Patient will improve Neck Focus On therapeutic Outcomes (FOTO) from 36% to 59% to decrease perceived limitation with maintaining/changing body position.   in progress   13. Patient will improve TUG test to 11 sec to improve walking speed for functional community ambulation  in progress   14. Patient will improve bilateral single leg stance to 15 seconds to decrease fall risk and improve ambulation.  in progress   15. Patient will improve 5x sit<>stand to 11 seconds to improve functional strength and promote mobility.   in progress   16. Patient will improve 30 second sit<>stand to 15 reps to improve functional strength and promote mobility.  in progress   17. Patient will lift 10-15 lbs with <2/10 pain to move her plants and perform household chores.   in progress        PLAN     Continue with postural reeducation,  neck range of motion, neck strengthening, and dry needling    Plan of care Certification: 8/22/2024 to 11/15/2024.     Outpatient Physical Therapy 1-2 times weekly for 12 weeks to include the following interventions: Gait Training, Manual Therapy, Moist Heat/ Ice, Neuromuscular Re-ed, Patient Education, Therapeutic Activities, and Therapeutic Exercise.   Patient will be seen by a physical therapist minimally every 6th visit or every 30 days.    Alcon Estrada, PT

## 2024-10-01 ENCOUNTER — CLINICAL SUPPORT (OUTPATIENT)
Dept: REHABILITATION | Facility: OTHER | Age: 54
End: 2024-10-01
Payer: COMMERCIAL

## 2024-10-01 DIAGNOSIS — R29.3 POSTURE ABNORMALITY: ICD-10-CM

## 2024-10-01 DIAGNOSIS — Z74.09 IMPAIRED FUNCTIONAL MOBILITY, BALANCE, GAIT, AND ENDURANCE: ICD-10-CM

## 2024-10-01 DIAGNOSIS — M54.2 ACUTE NECK PAIN: Primary | ICD-10-CM

## 2024-10-01 DIAGNOSIS — M54.50 ACUTE BILATERAL LOW BACK PAIN WITHOUT SCIATICA: ICD-10-CM

## 2024-10-01 PROCEDURE — 97140 MANUAL THERAPY 1/> REGIONS: CPT | Mod: PN

## 2024-10-01 PROCEDURE — 97112 NEUROMUSCULAR REEDUCATION: CPT | Mod: PN
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
## 2024-10-02 ENCOUNTER — TELEPHONE (OUTPATIENT)
Dept: NEUROLOGY | Facility: CLINIC | Age: 54
End: 2024-10-02
Payer: COMMERCIAL

## 2024-10-02 ENCOUNTER — CLINICAL SUPPORT (OUTPATIENT)
Dept: REHABILITATION | Facility: HOSPITAL | Age: 54
End: 2024-10-02
Payer: COMMERCIAL

## 2024-10-02 DIAGNOSIS — R41.841 COGNITIVE COMMUNICATION DISORDER: Primary | ICD-10-CM

## 2024-10-02 PROCEDURE — 97129 THER IVNTJ 1ST 15 MIN: CPT | Mod: PO | Performed by: SPEECH-LANGUAGE PATHOLOGIST

## 2024-10-02 PROCEDURE — 97130 THER IVNTJ EA ADDL 15 MIN: CPT | Mod: PO | Performed by: SPEECH-LANGUAGE PATHOLOGIST

## 2024-10-02 NOTE — TELEPHONE ENCOUNTER
Called Pt to confirm tomorrow's appt. I was unable to speak with her but left a vm. Pt was advised to arrive early and informed about the 10 min mumtaz period.

## 2024-10-02 NOTE — PATIENT INSTRUCTIONS
Mindfulness & Relaxation Resources   Phone applications:    Insight timer: Free to download, free content or paid option $59.99/year   Smiling mind: Free to download, free content    Breathe: Free to download, free content    Calm: Free to download, free content/version or 7- day free trial for premium and then paid option $69.99/year   Headspace: Free year trial if unemployed or 2- week free trial and then $69.99/year   University Based Websites/Links:   [facility]: Free online resource with guided audio and video mindfulness/meditation recordings as well as yoga videos. Follow the link below to access:    [email or web address]   [facility]: Free online resource with guided meditations and scripts; [facility] mindfulness phone adalberto also available to download. Follow the link below to access:    [email or web address]   [facility]: Free online resource with guided meditations, autogenics, and visualizations. Follow the link below to access:   [email or web address]   Other Web-based Resources:    The Free Mindfulness Project: created to develop a collection of free to download mindfulness-based exercises in a centralized location. Follow the link below to access:    [email or web address]   [employer]: A great tool to search for free videos of whichever mindfulness techniques suit you best. Follow the below link to access the [employer] [facility] YouTube page with guided meditations, breathing techniques, yoga flows and more:   [email or web address]   Guided mindfulness meditation with Leif Dow: mp3 series and books available to purchase, as well as daily meditations/dialogue. Follow the link below to access:   [email or web address]   Guided mountain meditations: Follow the link below to access:    [email or web address]/watch?[email or web address]&[email or web address]   [email or web address]/watch?[email or web address]-[email or web address]       Goal Plan Action Review Strategy for Planning  Goal: what is your goal? What are you trying to accomplish?  Plan: what are the specific steps to get there?  Action: try to follow your plan  Review: How did it work?     VI Chawla., MARCELA Tafoya, & JULIETA Kaiser (2012). Cognitive rehabilitation manual: Translating evidence-based recommendations into practice. [place], [place]: [employer].

## 2024-10-03 ENCOUNTER — OFFICE VISIT (OUTPATIENT)
Dept: NEUROLOGY | Facility: CLINIC | Age: 54
End: 2024-10-03
Payer: COMMERCIAL

## 2024-10-03 VITALS — HEART RATE: 76 BPM | SYSTOLIC BLOOD PRESSURE: 114 MMHG | DIASTOLIC BLOOD PRESSURE: 75 MMHG

## 2024-10-03 DIAGNOSIS — G47.9 FATIGUE DUE TO SLEEP PATTERN DISTURBANCE: ICD-10-CM

## 2024-10-03 DIAGNOSIS — R41.89 COGNITIVE CHANGES: ICD-10-CM

## 2024-10-03 DIAGNOSIS — S06.0XAS CONCUSSION WITH UNKNOWN LOSS OF CONSCIOUSNESS STATUS, SEQUELA: Primary | ICD-10-CM

## 2024-10-03 DIAGNOSIS — M54.81 OCCIPITAL NEURITIS: ICD-10-CM

## 2024-10-03 DIAGNOSIS — F34.89 OTHER SPECIFIED PERSISTENT MOOD DISORDERS: ICD-10-CM

## 2024-10-03 DIAGNOSIS — S13.4XXS WHIPLASH INJURIES, SEQUELA: ICD-10-CM

## 2024-10-03 DIAGNOSIS — F43.10 PTSD (POST-TRAUMATIC STRESS DISORDER): ICD-10-CM

## 2024-10-03 DIAGNOSIS — R53.83 FATIGUE DUE TO SLEEP PATTERN DISTURBANCE: ICD-10-CM

## 2024-10-03 DIAGNOSIS — M54.2 CERVICALGIA OF OCCIPITO-ATLANTO-AXIAL REGION: ICD-10-CM

## 2024-10-03 DIAGNOSIS — M54.81 CERVICO-OCCIPITAL NEURALGIA: ICD-10-CM

## 2024-10-03 PROCEDURE — 99999 PR PBB SHADOW E&M-EST. PATIENT-LVL IV: CPT | Mod: PBBFAC,,, | Performed by: STUDENT IN AN ORGANIZED HEALTH CARE EDUCATION/TRAINING PROGRAM

## 2024-10-03 NOTE — PATIENT INSTRUCTIONS
The patient was instructed to ice the occipital region for no more than 20 minutes at least once a day but may repeat this as many times as they would like.  Discussed ergonomic accommodations for occipital neuritis/neuralgia. Mainly perform all work at eye level to minimize continued neck flexion which will aggravate the nerve.  Patient was encouraged to do daily light cardio exercise but instructed to limit physical activities to walking, walking in water up to the waist only or riding a stationary bike, recumbent preferred. No weight lifting in upper body, no neck massage, no acupuncture of neck, and no dry needling of upper neck. No neck PT unless otherwise stated. If neck PT is recommended, the therapist may do joint manipulation at this time but no suboccipital soft tissue therapy. Any of your therapists may also do passive neck range of motion activities. No chiropractor work on neck. Lower body strengthening with resistant bands, leg machines, and strapping weights to legs okay. No core body workouts. No running or use of cardio equipment other than stationary bike. No swimming or body surfing. No amusement park rides. No lifting more than 5-10 lbs and bend at the knees, not the waist.  Continue vestibular PT for imbalance, convergence insufficiency  Continue for lower neck/upper back to mid back PT with dry needling. No soft tissue manipulation of suboccipital region if you start to feel worse. All joint manipulation is fine. Place previously   Continue for speech therapy for cognitive impairment/word-finding difficulty   Referral for social work or psychology (whoever can see the patient soonest) consultation, therapy, and treatment. 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 The  question being answered by this consultation is to further identify any mental health, sleep hygiene, and/or cognitive barriers impacting the patient's ability to heal from their TBI/neck injury. The information from this consultation will be used by myself and other providers/therapists to help guide an individual care plan to help treat this patient's symptoms from TBI/neck injury. Any delays or failures to address cognitive, sleep, psychological symptoms after TBI/neck injury can contribute to persistent symptoms, prolong their overall recovery process, and potentially lead to permanent symptoms. And of note, this referral is not for neuropsychology or neurocognitive testing. This referral is specifically for social work or psychological interventions based on the consultation these services provide.
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
## 2024-10-07 NOTE — PROGRESS NOTES
"  [facility]   Physical Therapy  Updated POC     Name: Heather Scheuermann Hammond  Clinic Number: [id number]    Therapy Diagnosis:   Encounter Diagnoses   Name Primary?    Deficit of vestibulo-ocular reflex Yes    Dizziness     Balance problem     Decreased functional activity tolerance      Physician: Loida Batres MD    Visit Date: 10/8/2024    Physician Orders: PT Eval and Treat;   Order comments: Referral for vestibular therapy.   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]   Imbalance [R26.89]  Evaluation Date: 8/21/2024  Authorization Period Expiration: 12/31/24  Plan of Care Expiration: 10/18/2024  Updated Plan of Care Expiration: 11/29/2024  Visit # / Visits authorized: 13/20 (8th vestibular PT visit)    Progress Note Due: 10/23/24    PTA Visit #: 0/5     Time In: 0807   Time Out: 0845  Total Billable Time: 38 minutes    SUBJECTIVE     Pt reports: She is doing okay this morning, [place] was [place] a little late.     Response to previous treatment: tolerated well  Functional change: ongoing    Pain: 0/10  Location: headache    OBJECTIVE     Objective Measures updated on 9/23/24. See below for details.     Treatment     Sana received the treatments listed below:      Sana received therapeutic exercises to develop endurance for 00 minutes including:    NP      Sana participated in neuromuscular re-education activities to improve: Balance and motion tolerance for 28 minutes. The following activities were included:    Oculomotor and Gaze Stabilization Adaptation Training:  Gaze shifts: Simple "X" target  1 x 20s, Horizontal, self-selected speed  2 x 30s, Horizontal, self-selected speed  3 x 30s, Vertical, self-selected speed    VORx1: Simple "X" target  2 x 30s, Horizontal, 80 bpm, X target -- " "increased headache following  2 x 30s, Vertical, 80 bpm, X target -- better tolerance than horizontal    Balance Training: standby by assist     // bars:   Airex foam:  2 x 30", NBOS, eyes closed  2 x 30", NBOS, eyes open + up/down head nods  2 x 30", NBOS, eyes open + left/right head nods    Firm ground:  1 x 30" B, Tandem stance    --> Total activity time includes rest breaks as needed between sets/tasks       Sana participated in dynamic functional therapeutic activities to improve functional performance for 10 minutes, including:    Functional Motion Tolerance:  Treadmill training:  X 8 minutes, 1.8 mph, 2% incline, B UE support, emergency clip donned for safety  > reported feeling increased lightheadedness following    --> Total activity time includes rest break following to allow symptoms to return to baseline      Patient Education and Home Exercises     Home Exercises Provided and Patient Education Provided     Education provided:   8/27: - Visual demonstration, verbal instruction, and written handout provided for interventions included as part of pt's home exercise program   - PT provided education on monitoring her symptoms throughout the day to take appropriate rest breaks and improve her tolerance to functional activities    Written Home Exercises Provided: yes. Exercises were reviewed and Sana was able to demonstrate them prior to the end of the session.  Sana demonstrated good  understanding of the education provided. See EMR under Patient Instructions for exercises provided during therapy sessions    ASSESSMENT   Sana tolerated today's session fairly well. She was able to progress oculomotor interventions on today with moderate symptom provocation; expressed understanding of importance performing these exercises at home to improve her tolerance. She performed well with appropriate challenge noted during balance interventions. She continues to be appropriate for skilled physical therapy " services to emphasizing oculomotor, balance, and motion tolerance training.     Sana Is progressing well towards her goals.   Pt prognosis is Good.     Pt will continue to benefit from skilled outpatient physical therapy to address the deficits listed in the problem list box on initial evaluation, provide pt/family education and to maximize pt's level of independence in the home and community environment.     Pt's spiritual, cultural and educational needs considered and pt agreeable to plan of care and goals.     Anticipated barriers to physical therapy: apprehension/increased fear of falling     Goals:   Short Term Goals: 4 weeks   Pt will receive an individualized home exercise program. Met 8/27/24  Pt will tolerate performing smooth pursuits at >/= 45 bpm for at least 30 seconds to show improved visual tracking. Progressing  Pt will tolerate performing saccades at >/= 80 bpm to for at least 30 seconds to show improved visual scanning ability. Progressing  Pt will tolerate performing VOR x1 at >/= 70 bpm for at least 30 seconds to show improved gaze stabilization and motion tolerance. Progressing  Pt will improve convergence point to </= 36 cm for improved near point focus. Met 9/23/24  Pt will improve postural control with LA condition 6 score of at least 15 s with minimal sway for decreased fall risk with standing ADLs. Met 9/23/24  PT will perform Functional Gait Assessment and other tests of motion tolerance as indicated to assess pt tolerance to functional activities. Met 8/27/24  PT will assess CRT's as indicated per pt symptoms. Ongoing     Long Term Goals: 8 weeks   Pt will be independent with an individualized home exercise program. Ongoing  Pt will tolerate performing smooth pursuits at >/= 60 bpm for at least 30 seconds to show improved visual tracking. Progressing  Pt will tolerate performing saccades at >/= 90 bpm to for at least 30 seconds to show improved visual scanning ability.  Progressing  Pt will tolerate performing VOR x1 at >/= 80 bpm for at least 30 seconds to show improved gaze stabilization and motion tolerance. Progressing  Pt will improve convergence point to </= 25 cm for improved near point focus. Progressing  Pt will improve postural control with LA condition 2 score of at least 30 s with minimal sway for decreased fall risk with standing ADLs. Ongoing  Pt will improve postural control with LA condition 4 score of at least 10 s with minimal sway for decreased fall risk with standing ADLs. Ongoing  Pt will improve postural control with LA condition 6 score of at least 20 s with minimal sway for decreased fall risk with standing ADLs. Met 9/23/24  Patient will improve her FOTO score from 48%  to at least 60% for improved self perception of functional mobility.(score 0-100, high score indicates greater level of function) Ongoing  Pt will improve Functional Gait Assessment (FGA) score to at least 25/30 for increased independence with home and community ambulation. Progressing  New 9/23/24: Pt will improve postural control with LA condition 6 score of at least 30 s with minimal sway for decreased fall risk with standing ADLs      PLAN     Extend PT POC from 10/18/24 to 11/29/24    Continue to progress oculomotor, balance, and motion tolerance interventions as tolerated    Ruth Rodgers, PT

## 2024-10-08 ENCOUNTER — CLINICAL SUPPORT (OUTPATIENT)
Dept: REHABILITATION | Facility: HOSPITAL | Age: 54
End: 2024-10-08
Payer: COMMERCIAL

## 2024-10-08 DIAGNOSIS — R42 DIZZINESS: ICD-10-CM

## 2024-10-08 DIAGNOSIS — R26.89 BALANCE PROBLEM: ICD-10-CM

## 2024-10-08 DIAGNOSIS — H81.8X9 DEFICIT OF VESTIBULO-OCULAR REFLEX: Primary | ICD-10-CM

## 2024-10-08 DIAGNOSIS — R68.89 DECREASED FUNCTIONAL ACTIVITY TOLERANCE: ICD-10-CM

## 2024-10-08 PROCEDURE — 97530 THERAPEUTIC ACTIVITIES: CPT | Mod: PO

## 2024-10-08 PROCEDURE — 97112 NEUROMUSCULAR REEDUCATION: CPT | Mod: PO

## 2024-10-08 NOTE — PROGRESS NOTES
"[facility]   Physical Therapy Treatment Note     Name: Heather Scheuermann [place]  Clinic Number: [id number]    Therapy Diagnosis:   Encounter Diagnoses   Name Primary?    Acute neck pain Yes    Acute bilateral low back pain without sciatica     Posture abnormality     Impaired functional mobility, balance, gait, and endurance      Physician: Loida Batres MD; Eunice Reynoso    Visit Date: 10/9/2024    Physician Orders: PT Evaluate and Treat - Cervical retraction scapula stabilization posture training and back and core exercises and home exercise program; Referral for lower neck/upper back to mid back PT with dry needling. No soft tissue manipulation of suboccipital region if you start to feel worse. All joint manipulation is fine.    Medical Diagnosis from Referral: Neck pain (M54.2); Acute midline low back pain without sciatica (M54.50)  Evaluation Date: 8/22/2024  Authorization Period Expiration: 12/31/2024  Plan of Care Certification Period: 8/22/2024 - 10/18/2024 --> updated 10/9/2024 - 1/4/2025  Visit # / Visits authorized: 14/ 20 (7 visits at this location)       Progress Note Due: 11/9/2024  FOCUS ON THERAPEUTIC OUTCOMES (FOTO): 9/25/2024 (3/5; 2)  PTA Visit #: 0/5     Precautions: Standard and concussion    Time In: 8:21 am (late arrival)  Time Out: 9:00 am  Total Billable Time: 38 minutes      SUBJECTIVE     Patient reports: See updated plan of care     She was compliant with home exercise program.  Response to previous treatment: "okay", less tight in the neck  Functional change: no change reported today    Pain: 5/10  Location: bilateral neck and low back, headaches     OBJECTIVE     Objective measures updated at progress report unless otherwise noted.     See updated plan of care 10/9/2024     TREATMENT       Sana received the treatments listed below:      received therapeutic exercises to develop strength and range of motion for 00 minutes including:  [] Pectoral " "stretch over towel roll x 3 minutes   [] upper body ergometer 3' forward/3' reverse following dry needling  [] levator scapular stretches x 30"  [] upper trapezius stretches x 30"  [] sternocleidomastoid stretches x 30"    received the following manual therapy techniques: Soft tissue Mobilization were applied to the: neck/low back for 00 minutes, including:   [] soft tissue mobilization bilateral sternocleidomastoid, bilateral upper trapezius, bilateral levator scapulae, bilateral temporalis  [] bilateral 1st rib mobilizations  [] bilateral scapular mobilizations    received the following dynamic functional therapeutic activities to improve functional performance for 38 minutes, including:  [] Review of home exercise program  [] Education regarding dry needling, review of consent form   [x] Re-assessment/updated plan of care     received the following neuromuscular re-education activities to improve: Muscle inhibition, Balance, Coordination, Kinesthetic, Sense, Proprioception, and Posture for  00 minutes. The following activities were included:  [] Chin tuck 20 x 3 second holds  [] Chin tuck + rotation x 10 each   [] Cervical retraction 20 x 3 second holds  [] Scapular retraction 5 second holds over towel roll x 2 minutes  [] Supine horizontal abduction with red band over towel roll 2 x 10   [] Seated no money with red band 2 x 10     [] Row with red theratube 2x 10   [] Shoulder extension with red theratube 2x 10     [] Bridge 2x 10   [] Lower trunk rotation x3 minutes  [] Supine clamshells 3-ways x20 each   [] Standing hip abduction 2x 10 bilateral   [] Standing hip extension 2x 10 bilateral       [] Application of TDN: Pt educated on benefits and potential side effects of dry needling. Educated pt on benefits, precautions, side effects following TDN. Educated pt to use heat following treatment sessions if pt is experiencing pain or soreness. Pt verbalized good understanding of education.  Pt signed written " consent to dry needling. Pt gave verbal consent for DN    Pt received dry needling to the below listed muscles using 15 mm and 30 mm needles.  [] Bilateral upper trapezius   [] Bilateral levator scapulae  [] Right C5 (in and out secondary increased pain)  [] Bilateral temporalis    Winding performed every 5 minutes during treatment.       PATIENT EDUCATION AND HOME EXERCISES     Home Exercises Provided and Patient Education Provided     Education provided:   Reaching out to physician regarding referral for psychologist/   Progress with re-assessment  Resuming treatments including soft tissue  Updated home exercise program with median and ulnar nerve glides     Written Home Exercises Provided: yes. Exercises were reviewed and Sana was able to demonstrate them prior to the end of the session.  Sana demonstrated fair  understanding of the education provided. See Electronic Medical Record under Patient Instructions for exercises provided during therapy sessions    ASSESSMENT     See updated plan of care     Sana Is progressing well towards her goals.   Patient prognosis is Good.     Patient will continue to benefit from skilled outpatient physical therapy to address the deficits listed in the problem list box on initial evaluation, provide patient/family education and to maximize patient's level of independence in the home and community environment.     Patient's spiritual, cultural and educational needs considered and Patient agreeable to plan of care and goals.     Anticipated barriers to physical therapy: concussion symptoms, multiple therapies; multiple body parts involved     Goals:      Short term goals: In 4 weeks,  Goal Status   Patient will be independent with home exercise program to promote improved therapy outcomes.  MET   2. Patient will perform palloff press with good control to demonstrate improved core strength  in progress   3. Patient will improve bilateral lower extremity Manual  Muscle test to 4/5 to demonstrate improved strength for functional tasks including dressing, bathing, grooming, walking, stairs and transitional movements.   in progress   4. Patient will improve shoulder flexion and cervical range of motion by 10 degrees to improve functional mobility including dressing, bathing, grooming, walking, stairs and transitional movements.   in progress   5. Patient will improve TUG test to 15 sec to improve walking speed for functional community ambulation MET   6. Patient will improve 5x sit<>stand to 15 to improve functional strength and promote mobility.   MET    7. Patient will require minimal verbal cueing from PT for proper scapular retraction in order to improve postural awareness.  in progress         Long term goals: In 8 weeks, Goal Status   8. Patient will be independent with progressed home exercise program to self manage symptoms.   in progress   9. Patient will improve bilateral upper extremity/lower extremity Manual Muscle test to 5/5 to improve strength for functional tasks including dressing, bathing, walking, stairs and transitional movements.   in progress   10. Patient will report walking for 30 minutes with <2/10 pain 5x/week to promote healthy lifestyle and regular physical exercise.   in progress   11. Patient will improve Lumbar Focus On therapeutic Outcomes (FOTO) from 46% to 68% to decrease perceived limitation with maintaining/changing body position.   in progress   12. Patient will improve Neck Focus On therapeutic Outcomes (FOTO) from 36% to 59% to decrease perceived limitation with maintaining/changing body position.   in progress   13. Patient will improve TUG test to 11 sec to improve walking speed for functional community ambulation  in progress   14. Patient will improve bilateral single leg stance to 15 seconds to decrease fall risk and improve ambulation.  in progress   15. Patient will improve 5x sit<>stand to 11 seconds to improve functional strength  and promote mobility.  In progress   16. Patient will improve 30 second sit<>stand to 15 reps to improve functional strength and promote mobility.  in progress   17. Patient will lift 10-15 lbs with <2/10 pain to move her plants and perform household chores.   in progress        PLAN     Continue with postural reeducation, neck range of motion, neck strengthening, and dry needling      Updated Certification Period: 10/9/2024 to 1/4/2025   Recommended Treatment Plan: 1 times per week for 12 weeks:  Electrical Stimulation , Manual Therapy, Moist Heat/ Ice, Neuromuscular Re-ed, Patient Education, Therapeutic Activities, Therapeutic Exercise, and dry needling    Christen Guadalupe, PT

## 2024-10-09 ENCOUNTER — CLINICAL SUPPORT (OUTPATIENT)
Dept: REHABILITATION | Facility: OTHER | Age: 54
End: 2024-10-09
Payer: COMMERCIAL

## 2024-10-09 DIAGNOSIS — M54.50 ACUTE BILATERAL LOW BACK PAIN WITHOUT SCIATICA: ICD-10-CM

## 2024-10-09 DIAGNOSIS — M54.2 ACUTE NECK PAIN: Primary | ICD-10-CM

## 2024-10-09 DIAGNOSIS — R29.3 POSTURE ABNORMALITY: ICD-10-CM

## 2024-10-09 DIAGNOSIS — Z74.09 IMPAIRED FUNCTIONAL MOBILITY, BALANCE, GAIT, AND ENDURANCE: ICD-10-CM

## 2024-10-09 NOTE — PLAN OF CARE
"[facility]  Physical Therapy Plan of Care Note     Name: Heather Scheuermann [place]  Clinic Number: [id number]    Therapy Diagnosis:   Encounter Diagnoses   Name Primary?    Acute neck pain Yes    Acute bilateral low back pain without sciatica     Posture abnormality     Impaired functional mobility, balance, gait, and endurance      Physician: Loida Batres MD    Visit Date: 10/9/2024    Physician Orders: PT Evaluate and Treat - Cervical retraction scapula stabilization posture training and back and core exercises and home exercise program; Referral for lower neck/upper back to mid back PT with dry needling. No soft tissue manipulation of suboccipital region if you start to feel worse. All joint manipulation is fine.    Medical Diagnosis from Referral: Neck pain (M54.2); Acute midline low back pain without sciatica (M54.50)  Evaluation Date: 8/22/2024  Authorization Period Expiration: 12/31/2024  Prior Certification Period: 8/22/2024 to 10/16/2024  Current Certification Period:  10/9/2024 to 1/3/2025  Visit # / Visits authorized: 14/20 (7 visits at this location)  Re-assessment due: 11/9/2024  FOTO: 9/25/2024 (3/5; 2)     Cancelled Visits: 0  No Show Visits: 0    Precautions: Standard and concussion   Functional Level Prior to Evaluation:  independent with all mobility, tolerating a full day's work with no issues     Subjective     Update:   Patient reports: she thought she had therapy in [place] and went there which is why she was late today. Overall she is "feeling okay". Continues to have light headaches and jaw pain and tension in the neck and shoulders that won't let loose. She feels like overall the dizziness, headaches, and endurance is slowly improving. Would really like someone to just make her shoulders relax. Pain can still up to a 8/10 at times and does not improve more than 5/10. Jaw pain is only on the right has gotten worse and can cause headaches to occur. Denies " "popping, clicking or locking of the jaw but it can can intermittently pop while eating.      She was compliant with home exercise program.  Response to previous treatment: "okay", less tight in the neck  Functional change: no change reported today     Pain: 5/10  Location: bilateral neck and low back, headaches     Objective      Update:   Cervical active range of motion: Pain/Dysfunction with Movement:   Flexion: 60 degrees  pulling   Extension: 40 degrees  Pulling    Right side bendin degrees  Tightness/pulling   Left side bendin degrees  Tightness/pulling    Right rotation: 60 degrees  pulling   Left rotation: 58 degrees  Pulling          Shoulder flexion: left: 150 degrees; right: 160 degrees  Shoulder abduction: equal bilateral   Shoulder external rotation: left: C7; right: T1  Shoulder internal rotation: equal bilateral       Manual Muscle test/Strength:    Myotome Right Left Pain/Dysfunction with Movement   C4- Shoulder Elevation 5/5 5/5     C5- Shoulder Abduction 5/5 5/5     Shoulder flexion 4+/5  4+/5     Shoulder extension 5/5 5/5     Shoulder external rotation  4+/5 4+/5     Shoulder internal rotation  5/5 5/5     C6- Wrist Extension 5/5 5/5     Elbow flexion 5/5 5/5     Wrist flexion 5/5 5/5     C7- Elbow Extension 5/5 5/5     C8- Phalangeal Abduction 4/5 4/5     T1- 5th Finger Abduction 4/5 4/5        Ulnar Nerve tension: (+) bilateral    Median nerve Tension: (+) bilateral      5x sit<>stand: 13 seconds     Normal:   60-69: 11.4 seconds  70-79: 12.6 seconds  80-89: 12.7 seconds     30 seconds sit<>stand: 11 reps     Normal:   Age Male Female   60-64 17 15   65-69 16 15   70-74 15 14   75-79 14 13   80-84 13 12   85-89 11 11   90-94 9 9      Lower extremity last assessed 2024   Range of Motion:      Thoracolumbar Active range of motion Pain/Dysfunction with Movement   Flexion To toes   Walking hands up thighs to return to upright position   Extension 25% limited  Tightness  in shoulders "   Right side bending To knee     Left side bending To knee     Right Rotation normal      Left Rotation normal         Manual Muscle test/Strength:    Right Left Pain/Dysfunction with Movement   Hip Flexion 5/5 4+/5     Hip Extension 3+/5 3+/5     Hip internal rotation 4/5 4/5     Hip external rotation 5/5 5/5     Hip Abduction 4-/5 4-/5        Timed Up and Go: 13 seconds     Normal:  Community dwelling adult: >13.5 seconds  Older stroke patients: >14 seconds  Older adults already attending falls clinic: >15 seconds  Frail Elderly: >32.6 seconds  LE amputees >19 seconds  Parkinson's disease: >11.5 seconds  Hip OA: >10 seconds  Vestibular disorders: >11.1 seconds     (Reference: https://www.sralab.org/rehabilitation-measures/timed-and-go#older-adults-and-geriatric-care)      Modified Oswestry: 40.0/100 --> 22.0/100 (higher score = greater disability)  NDI: 60.0/100 --> 32.0/100 (higher score = greater disability)     CMS Impairment/Limitation/Restriction for Focus On therapeutic Outcomes (FOTO) Lumbar Spine Survey     Therapist reviewed Focus On therapeutic Outcomes (FOTO) scores for Heather Scheuermann [place] on 9/25/2024.   Focus On therapeutic Outcomes (FOTO) documents entered into [employer] - see Media section.     Intake Score: 46% --> 57%      Neck Intake score: 36% --> 53%            Assessment     Update: Patient has made some progress overall with activity tolerance, decreased low back pain and cervical range of motion but continues to report significant tightness affecting daily activities. Also noted difficulty recalling follow up with MD last week and mixed up therapy locations today. Patient would likely benefit from speaking with someone regarding anxious feelings and increased tension when driving her car likely contributing to jaw pain, numbness in arms/hands while driving, and neck tightness. Advised patient to follow up with MD if she is not contacted regarding referral for these services. Will continue  to address cervical, thoracic and shoulder/scapular musculature and joint mobility as well as initiating jaw exercises at next visit.     Discussed dry needling for the right jaw but patient was not interested in this at this time. Patient was encouraged to let therapist know if she changed her mind.     Previous Short Term Goals Status:   in progress  New Short Term Goals Status:   3 met, 4 in progress   Long Term Goal Status: continue per initial plan of care.  Reasons for Recertification of Therapy:   Patient will continue to benefit from additional skilled physical therapy to address remaining impairments as noted above including cervical range of motion, shoulder range of motion, posture, flexibility, activity tolerance, cervical/scapular/thoracic muscle tone and joint mobility, headaches and nerve irritation. These impairments continue to negatively affect the patient's Activities of daily living and participation with work, recreational and houseold duties.       GOALS    Short term goals: In 4 weeks,  Goal Status   Patient will be independent with home exercise program to promote improved therapy outcomes.  MET   2. Patient will perform palloff press with good control to demonstrate improved core strength  in progress   3. Patient will improve bilateral lower extremity Manual Muscle test to 4/5 to demonstrate improved strength for functional tasks including dressing, bathing, grooming, walking, stairs and transitional movements.   in progress   4. Patient will improve shoulder flexion and cervical range of motion by 10 degrees to improve functional mobility including dressing, bathing, grooming, walking, stairs and transitional movements.   in progress   5. Patient will improve Timed Up and Go test to 15 seconds to improve walking speed for functional community ambulation MET   6. Patient will improve 5x sit<>stand to 15 seconds to improve functional strength and promote mobility.  MET   7. Patient will  require minimal verbal cueing from PT for proper scapular retraction in order to improve postural awareness.  in progress         Long term goals: In 8 weeks, Goal Status   8. Patient will be independent with progressed home exercise program to self manage symptoms.   in progress   9. Patient will improve bilateral upper extremity/lower extremity Manual Muscle test to 5/5 to improve strength for functional tasks including dressing, bathing, walking, stairs and transitional movements.   in progress   10. Patient will report walking for 30 minutes with <2/10 pain 5x/week to promote healthy lifestyle and regular physical exercise.   in progress   11. Patient will improve Lumbar Focus On therapeutic Outcomes (FOTO) from 46% to 68% to decrease perceived limitation with maintaining/changing body position.   in progress   12. Patient will improve Neck Focus On therapeutic Outcomes (FOTO) from 36% to 59% to decrease perceived limitation with maintaining/changing body position.   in progress   13. Patient will improve TUG test to 11 sec to improve walking speed for functional community ambulation  in progress   14. Patient will improve bilateral single leg stance to 15 seconds to decrease fall risk and improve ambulation.  in progress   15. Patient will improve 5x sit<>stand to 11 seconds to improve functional strength and promote mobility.  In progress   16. Patient will improve 30 second sit<>stand to 15 reps to improve functional strength and promote mobility.  in progress   17. Patient will lift 10-15 lbs with <2/10 pain to move her plants and perform household chores.   in progress        Plan     Updated Certification Period: 10/9/2024 to 1/4/2025   Recommended Treatment Plan: 1 times per week for 12 weeks:  Electrical Stimulation  , Manual Therapy, Moist Heat/ Ice, Neuromuscular Re-ed, Patient Education, Therapeutic Activities, Therapeutic Exercise, and dry needling    Christen Guadalupe, PT

## 2024-10-10 ENCOUNTER — CLINICAL SUPPORT (OUTPATIENT)
Dept: REHABILITATION | Facility: HOSPITAL | Age: 54
End: 2024-10-10
Payer: COMMERCIAL

## 2024-10-10 ENCOUNTER — PATIENT MESSAGE (OUTPATIENT)
Dept: NEUROLOGY | Facility: CLINIC | Age: 54
End: 2024-10-10
Payer: COMMERCIAL

## 2024-10-10 DIAGNOSIS — R42 DIZZINESS: ICD-10-CM

## 2024-10-10 DIAGNOSIS — R41.841 COGNITIVE COMMUNICATION DISORDER: Primary | ICD-10-CM

## 2024-10-10 DIAGNOSIS — R68.89 DECREASED FUNCTIONAL ACTIVITY TOLERANCE: ICD-10-CM

## 2024-10-10 DIAGNOSIS — H81.8X9 DEFICIT OF VESTIBULO-OCULAR REFLEX: Primary | ICD-10-CM

## 2024-10-10 DIAGNOSIS — R26.89 BALANCE PROBLEM: ICD-10-CM

## 2024-10-10 PROCEDURE — 97130 THER IVNTJ EA ADDL 15 MIN: CPT | Mod: PO

## 2024-10-10 PROCEDURE — 97112 NEUROMUSCULAR REEDUCATION: CPT | Mod: PO

## 2024-10-10 PROCEDURE — 97129 THER IVNTJ 1ST 15 MIN: CPT | Mod: PO

## 2024-10-10 PROCEDURE — 97530 THERAPEUTIC ACTIVITIES: CPT | Mod: PO

## 2024-10-10 NOTE — PROGRESS NOTES
[facility]  Speech Therapy Treatment Note- Neurological Rehabilitation     Date: 10/10/2024     Name: Heather Scheuermann [place]   MRN: [id number]   Therapy Diagnosis:   Encounter Diagnosis   Name Primary?    Cognitive communication disorder Yes   Physician: Loida Batres MD  Physician Orders: Ambulatory Referral to Speech Therapy   Medical Diagnosis:   - S06.0XAA (ICD-10-CM) - Concussion with unknown loss of consciousness status, initial encounter  - S13.4XXA (ICD-10-CM) - Whiplash injuries, initial encounter  -R41.89 (ICD-10-CM) - Cognitive changes    Visit #/ Visits Authorized: 1/20 (plus evaluation)  Date of Evaluation:  9/17/24  Insurance Authorization Period: 9/5/24-12/31/24  Plan of Care Expiration Date:    11/1/24  Extended Plan of Care:  n/a   Progress Note: due 10/17/24     Time In:  0848  Time Out:  0927  Total Billable Time: 40 minutes      Precautions: Standard    Subjective   Patient reports: Headache this morning, overwhelm from Physical Therapy   She was not compliant to home exercise program as it was not yet established - established today   Response to previous treatment: good  Pain Scale: 8/10 on a Visual Analog Scale currently.  Pain Location: headache behind eyebrows, 8/10 for neck pain  . Headache likely from Physical Therapy.   Objective     TIMED  Procedure Min.   Cognitive Therapeutic Interventions, first 15 minutes CPT 87257  15   Cognitive Therapeutic Interventions, each additional 15 minutes CPT 22076  25            Short Term Goals: (2 weeks) Current Progress:   1. Patient will use Goal Plan Action Review strategy to complete moderate to complex reasoning, planning, or organization tasks with 90% accuracy independently to improve functional executive function skills.  Progressing/ Not Met 10/10/2024   Goal Plan Action Review strategy was introduced to aid in executive functioning deficits. Example of this strategy was provided by SLP.     Goal Plan Action  Review Strategy for Planning  Goal: what is your goal? What are you trying to accomplish?  Plan: what are the specific steps to get there?  Action: try to follow your plan  Review: How did it work?      Patient completed a in conjunction with spoon theory. Her goal is not to fatigue and Speech Language Pathologist and patient worked to develop strategies for cognitive pacing      2.Patient will independently implement cognitive/sensory rest periods throughout day after identifying cognitive fatigue including limiting sensory input (sound, light, etc.)    Progressing/ Not Met 10/10/2024   Patient ed completed regarding use of Spoon Theory, a way to visualize total daily energy for those with chronic illness. Ability to imagine spoons as units of energy (with 10 being full energy) that can be gained or lost helps the client manage their energy.     3.Patient will independently generate strategies to improve ability to complete tasks with enhanced accuracy and time, based on review of objective previous performance on trials of functional tasks with 80% accuracy.   Progressing/ Not Met 10/10/2024   Cognitive overwhelm noted with difficulty from Physical Therapy.         Patient Education and Home Program   Patient educated regarding the followin. Goal-plan-action-review strategy  2. Relative/scale of cognitive load  3. Relationship between mindfulness/cognitive rehabilitation and importance of engaging in frequent cognitive/sensory rest periods in efforts to extend cognitive reserve     Home program established: yes-review mindfulness resources  Patient verbalized understanding to all above education provided.     See Electronic Medical Record under Patient Instructions for exercises provided throughout therapy.  Assessment   Sana participated well today in today's session which focused on problem solving, meta-cognitive strategy training, and education. Patient with good awareness of need to utilize strategies  at this point. Excellent awareness, but significant fatigue continues. Pacing with spoons discussed throughout the session. Expectation of symptoms is worsening cognitive output.  Cognitive, Physical, and Emotional fatigue was not believed to have been a barrier to the session.     Sana is progressing well towards her goals. Current goals remain appropriate. Goals to be updated as necessary.     Patient prognosis is Good. Patient will continue to benefit from skilled outpatient speech and language therapy to address the deficits listed in the problem list on initial evaluation, provide patient/family education and to maximize patient's level of independence in the home and community environment.   Medical necessity is demonstrated by the following IMPAIRMENTS:  Cognition: Deficits in executive functioning places Sana at risk of decline in quality of life.    Barriers to Therapy: none identified   Patient's spiritual, cultural and educational needs considered and patient agreeable to plan of care and goals.  Plan   Continue Plan of Care with focus on rehabilitation and compensation for cognitive-linguistic impairments.     Layla Benton M.A., L-SLP, CCC-SLP, CBIS  Speech Language Pathologist  Certified Brain Injury Specialist  10/10/2024
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
## 2024-10-14 ENCOUNTER — OFFICE VISIT (OUTPATIENT)
Dept: SPINE | Facility: CLINIC | Age: 54
End: 2024-10-14
Attending: PHYSICAL MEDICINE & REHABILITATION
Payer: COMMERCIAL

## 2024-10-14 VITALS
SYSTOLIC BLOOD PRESSURE: 121 MMHG | OXYGEN SATURATION: 100 % | DIASTOLIC BLOOD PRESSURE: 57 MMHG | WEIGHT: 185.88 LBS | HEART RATE: 80 BPM | RESPIRATION RATE: 18 BRPM | HEIGHT: 68 IN | BODY MASS INDEX: 28.17 KG/M2

## 2024-10-14 DIAGNOSIS — M62.838 MUSCLE SPASM: ICD-10-CM

## 2024-10-14 DIAGNOSIS — M54.2 NECK PAIN: Primary | ICD-10-CM

## 2024-10-14 PROCEDURE — 3044F HG A1C LEVEL LT 7.0%: CPT | Mod: CPTII,S$GLB,, | Performed by: PHYSICAL MEDICINE & REHABILITATION

## 2024-10-14 PROCEDURE — 3008F BODY MASS INDEX DOCD: CPT | Mod: CPTII,S$GLB,, | Performed by: PHYSICAL MEDICINE & REHABILITATION

## 2024-10-14 PROCEDURE — 99999 PR PBB SHADOW E&M-EST. PATIENT-LVL V: CPT | Mod: PBBFAC,,, | Performed by: PHYSICAL MEDICINE & REHABILITATION

## 2024-10-14 PROCEDURE — 3078F DIAST BP <80 MM HG: CPT | Mod: CPTII,S$GLB,, | Performed by: PHYSICAL MEDICINE & REHABILITATION

## 2024-10-14 PROCEDURE — 1159F MED LIST DOCD IN RCRD: CPT | Mod: CPTII,S$GLB,, | Performed by: PHYSICAL MEDICINE & REHABILITATION

## 2024-10-14 PROCEDURE — 3074F SYST BP LT 130 MM HG: CPT | Mod: CPTII,S$GLB,, | Performed by: PHYSICAL MEDICINE & REHABILITATION

## 2024-10-14 PROCEDURE — 1160F RVW MEDS BY RX/DR IN RCRD: CPT | Mod: CPTII,S$GLB,, | Performed by: PHYSICAL MEDICINE & REHABILITATION

## 2024-10-14 PROCEDURE — 99214 OFFICE O/P EST MOD 30 MIN: CPT | Mod: S$GLB,,, | Performed by: PHYSICAL MEDICINE & REHABILITATION

## 2024-10-14 NOTE — PROGRESS NOTES
"  [facility]   Physical Therapy Treatment Note    Name: Heather Scheuermann [place]  Clinic Number: [id number]    Therapy Diagnosis:   Encounter Diagnoses   Name Primary?    Deficit of vestibulo-ocular reflex Yes    Dizziness     Balance problem     Decreased functional activity tolerance      Physician: Loida Batres MD    Visit Date: 10/15/2024    Physician Orders: PT Eval and Treat;   Order comments: Referral for vestibular therapy.   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]   Imbalance [R26.89]  Evaluation Date: 8/21/2024  Authorization Period Expiration: 12/31/24  Plan of Care Expiration: 10/18/2024  Updated Plan of Care Expiration: 11/29/2024  Visit # / Visits authorized: 16/20 (11th vestibular PT visit)    Progress Note Due: 10/23/24    PTA Visit #: 0/5     Time In: 0807 (late arrival; PT unable to accommodate due to schedule)  Time Out: 0845  Total Billable Time: 38 minutes    SUBJECTIVE     Pt reports: She is feeling okay this morning with no complaints. Reports having a mild headache at the start of her session.     Response to previous treatment: tolerated well  Functional change: ongoing    Pain: 3/10  Location: headache    OBJECTIVE     Objective Measures updated on 9/23/24.     Treatment     Sana received the treatments listed below:      Sana received therapeutic exercises to develop endurance for 00 minutes including:    NP      Sana participated in neuromuscular re-education activities to improve: Balance and motion tolerance for 26 minutes. The following activities were included:    Oculomotor and Gaze Stabilization Adaptation Training:  Gaze shifts: Simple "X" target  3 x 30s, Horizontal, self-selected speed  3 x 30s, Vertical, self-selected speed    VORx1: Simple "X" target  2 x 30s, Horizontal, 80 bpm, X target -- moderate dizziness reported " "following   2 x 30s, Vertical, 80 bpm, X target -- better tolerance than horizontal    Balance Training: standby by assist     // bars:   Wooden rocker board:   2 x 30", working board in A/P directions, eyes open  2 x 30", working board in left/right directions, eyes open    Foam fitter:  2 x 30", NBOS, eyes closed  1 x 30", NBOS, eyes closed + up/down head nods  1 x 30", NBOS, eyes closed + left/right head nods    --> Total activity time includes rest breaks as needed between sets/tasks       Sana participated in dynamic functional therapeutic activities to improve functional performance for 12 minutes, including:    Functional Motion Tolerance:  Hallway ambulation:  2 x 100 feet, walking + up/down head nods with gaze stabilization on target in distance  2 x 100 feet, walking + left/right head nods with gaze stabilization on target in distance    Clip transfer: at basketball goal  1 x 10 clips each direction, pt transferring clips stool <> basketball goal via trunk bending and quarter body turns  > ~3/10 motion tolerance reported    --> Total activity time includes rest break following to allow symptoms to return to baseline      Patient Education and Home Exercises     Home Exercises Provided and Patient Education Provided     Education provided:   8/27: - Visual demonstration, verbal instruction, and written handout provided for interventions included as part of pt's home exercise program   - PT provided education on monitoring her symptoms throughout the day to take appropriate rest breaks and improve her tolerance to functional activities    Written Home Exercises Provided: yes. Exercises were reviewed and Sana was able to demonstrate them prior to the end of the session.  Sana demonstrated good  understanding of the education provided. See EMR under Patient Instructions for exercises provided during therapy sessions    ASSESSMENT   Sana tolerated today's session fairly well. She reported increased " motion sickness and dizziness with oculomotor interventions on today and required slightly longer rest breaks to recover between sets/tasks. She reported better tolerance to walking and standing habituation interventions both with and without gaze stabilization with a much quicker return to symptom baseline noted. She continues to be appropriate for skilled physical therapy services to emphasizing oculomotor, balance, and motion tolerance training.     Sana Is progressing well towards her goals.   Pt prognosis is Good.     Pt will continue to benefit from skilled outpatient physical therapy to address the deficits listed in the problem list box on initial evaluation, provide pt/family education and to maximize pt's level of independence in the home and community environment.     Pt's spiritual, cultural and educational needs considered and pt agreeable to plan of care and goals.     Anticipated barriers to physical therapy: apprehension/increased fear of falling     Goals:   Short Term Goals: 4 weeks   Pt will receive an individualized home exercise program. Met 8/27/24  Pt will tolerate performing smooth pursuits at >/= 45 bpm for at least 30 seconds to show improved visual tracking. Progressing  Pt will tolerate performing saccades at >/= 80 bpm to for at least 30 seconds to show improved visual scanning ability. Progressing  Pt will tolerate performing VOR x1 at >/= 70 bpm for at least 30 seconds to show improved gaze stabilization and motion tolerance. Progressing  Pt will improve convergence point to </= 36 cm for improved near point focus. Met 9/23/24  Pt will improve postural control with LA condition 6 score of at least 15 s with minimal sway for decreased fall risk with standing ADLs. Met 9/23/24  PT will perform Functional Gait Assessment and other tests of motion tolerance as indicated to assess pt tolerance to functional activities. Met 8/27/24  PT will assess CRT's as indicated per pt symptoms.  Ongoing     Long Term Goals: 8 weeks   Pt will be independent with an individualized home exercise program. Ongoing  Pt will tolerate performing smooth pursuits at >/= 60 bpm for at least 30 seconds to show improved visual tracking. Progressing  Pt will tolerate performing saccades at >/= 90 bpm to for at least 30 seconds to show improved visual scanning ability. Progressing  Pt will tolerate performing VOR x1 at >/= 80 bpm for at least 30 seconds to show improved gaze stabilization and motion tolerance. Progressing  Pt will improve convergence point to </= 25 cm for improved near point focus. Progressing  Pt will improve postural control with LA condition 2 score of at least 30 s with minimal sway for decreased fall risk with standing ADLs. Ongoing  Pt will improve postural control with LA condition 4 score of at least 10 s with minimal sway for decreased fall risk with standing ADLs. Ongoing  Pt will improve postural control with LA condition 6 score of at least 20 s with minimal sway for decreased fall risk with standing ADLs. Met 9/23/24  Patient will improve her FOTO score from 48%  to at least 60% for improved self perception of functional mobility.(score 0-100, high score indicates greater level of function) Ongoing  Pt will improve Functional Gait Assessment (FGA) score to at least 25/30 for increased independence with home and community ambulation. Progressing  New 9/23/24: Pt will improve postural control with LA condition 6 score of at least 30 s with minimal sway for decreased fall risk with standing ADLs      PLAN     Extend PT POC from 10/18/24 to 11/29/24    Continue to progress oculomotor, balance, and motion tolerance interventions as tolerated    Ruth Rodgers, PT

## 2024-10-15 ENCOUNTER — CLINICAL SUPPORT (OUTPATIENT)
Dept: REHABILITATION | Facility: HOSPITAL | Age: 54
End: 2024-10-15
Payer: COMMERCIAL

## 2024-10-15 DIAGNOSIS — H81.8X9 DEFICIT OF VESTIBULO-OCULAR REFLEX: Primary | ICD-10-CM

## 2024-10-15 DIAGNOSIS — R42 DIZZINESS: ICD-10-CM

## 2024-10-15 DIAGNOSIS — R26.89 BALANCE PROBLEM: ICD-10-CM

## 2024-10-15 DIAGNOSIS — R68.89 DECREASED FUNCTIONAL ACTIVITY TOLERANCE: ICD-10-CM

## 2024-10-15 PROCEDURE — 97112 NEUROMUSCULAR REEDUCATION: CPT | Mod: PO

## 2024-10-15 PROCEDURE — 97530 THERAPEUTIC ACTIVITIES: CPT | Mod: PO

## 2024-10-16 ENCOUNTER — CLINICAL SUPPORT (OUTPATIENT)
Dept: REHABILITATION | Facility: OTHER | Age: 54
End: 2024-10-16
Payer: COMMERCIAL

## 2024-10-16 DIAGNOSIS — M54.50 ACUTE BILATERAL LOW BACK PAIN WITHOUT SCIATICA: ICD-10-CM

## 2024-10-16 DIAGNOSIS — R29.3 POSTURE ABNORMALITY: ICD-10-CM

## 2024-10-16 DIAGNOSIS — M54.2 ACUTE NECK PAIN: Primary | ICD-10-CM

## 2024-10-16 DIAGNOSIS — Z74.09 IMPAIRED FUNCTIONAL MOBILITY, BALANCE, GAIT, AND ENDURANCE: ICD-10-CM

## 2024-10-16 PROCEDURE — 97112 NEUROMUSCULAR REEDUCATION: CPT | Mod: PN

## 2024-10-16 PROCEDURE — 97110 THERAPEUTIC EXERCISES: CPT | Mod: PN

## 2024-10-16 PROCEDURE — 97140 MANUAL THERAPY 1/> REGIONS: CPT | Mod: PN

## 2024-10-16 NOTE — PROGRESS NOTES
"[facility]   Physical Therapy Treatment Note     Name: Heather Scheuermann [place]  Clinic Number: [id number]    Therapy Diagnosis:   Encounter Diagnoses   Name Primary?    Acute neck pain Yes    Acute bilateral low back pain without sciatica     Posture abnormality     Impaired functional mobility, balance, gait, and endurance      Physician: Loida Batres MD; Eunice Reynoso    Visit Date: 10/16/2024    Physician Orders: PT Evaluate and Treat - Cervical retraction scapula stabilization posture training and back and core exercises and home exercise program; Referral for lower neck/upper back to mid back PT with dry needling. No soft tissue manipulation of suboccipital region if you start to feel worse. All joint manipulation is fine.    Medical Diagnosis from Referral: Neck pain (M54.2); Acute midline low back pain without sciatica (M54.50)  Evaluation Date: 8/22/2024  Authorization Period Expiration: 12/31/2024  Plan of Care Certification Period: 8/22/2024 - 10/18/2024 --> updated 10/9/2024 - 1/4/2025  Visit # / Visits authorized: 14/ 20 (7 visits at this location)       Progress Note Due: 11/9/2024  FOCUS ON THERAPEUTIC OUTCOMES (FOTO): 9/25/2024 (3/5; 2)  PTA Visit #: 0/5     Precautions: Standard and concussion    Time In: 0802  Time Out: 0902  Total Billable Time: 60 minutes      SUBJECTIVE     Patient reports: her upper back/shoulders are killing her this morning. Reports tightness and soreness in her neck    She was compliant with home exercise program.  Response to previous treatment: "Fine."  Functional change: "still the same."    Pain: 5/10  Location: bilateral shoulders/neck    OBJECTIVE     Objective measures updated at progress report unless otherwise noted.     See updated plan of care 10/9/2024     TREATMENT       Sana received the treatments listed below:      received therapeutic exercises to develop strength and range of motion for 15 minutes including:  [] " "Pectoral stretch over towel roll x 3 minutes   [x] upper body ergometer 3' forward/3' reverse  [x] levator scapular stretches 2 x 30"  [x] upper trapezius stretches 2 x 30"  [x] sternocleidomastoid stretches 2 x 30"  [x] uprascapular nerve glide x 10     received the following manual therapy techniques: Soft tissue Mobilization were applied to the: neck/low back for 15 minutes, including:   [x] soft tissue mobilization bilateral sternocleidomastoid, bilateral upper trapezius, bilateral levator scapulae, bilateral temporalis  [x] bilateral 1st rib mobilizations  [x] bilateral scapular mobilizations  [x] Bilateral upper trapezius positional release  [x] Bilateral C5-C7 posterior/anterior mobilizations    received the following dynamic functional therapeutic activities to improve functional performance for  minutes, including:  [] Review of home exercise program  [] Education regarding dry needling, review of consent form   [] Re-assessment/updated plan of care     received the following neuromuscular re-education activities to improve: Muscle inhibition, Balance, Coordination, Kinesthetic, Sense, Proprioception, and Posture for  30 minutes. The following activities were included:  [x]+cervical isometric with red band x 20 repetitions each: retraction, side bending  [x]+cervical nods, rotation against ball on wall x 20 repetitions   [x]+lower trapezius lift offs 2 x 0 repetitions   [x]+serratus stars x 10 repetitions with red band     [] Chin tuck 20 x 3 second holds  [] Chin tuck + rotation x 10 each   [] Cervical retraction 20 x 3 second holds  [] Scapular retraction 5 second holds over towel roll x 2 minutes  [x] Seated horizontal abduction with red band  x 20   [x] Seated no money with red band x 20     [] Row with red theratube 2x 10   [] Shoulder extension with red theratube 2x 10     [] Bridge 2x 10   [] Lower trunk rotation x3 minutes  [] Supine clamshells 3-ways x20 each   [] Standing hip abduction 2x 10 " bilateral   [] Standing hip extension 2x 10 bilateral       [] Application of TDN: Pt educated on benefits and potential side effects of dry needling. Educated pt on benefits, precautions, side effects following TDN. Educated pt to use heat following treatment sessions if pt is experiencing pain or soreness. Pt verbalized good understanding of education.  Pt signed written consent to dry needling. Pt gave verbal consent for DN    Pt received dry needling to the below listed muscles using 15 mm and 30 mm needles.  [] Bilateral upper trapezius   [] Bilateral levator scapulae  [] Right C5 (in and out secondary increased pain)  [] Bilateral temporalis    Winding performed every 5 minutes during treatment.       PATIENT EDUCATION AND HOME EXERCISES     Home Exercises Provided and Patient Education Provided     Education provided:   No new home exercise program      Written Home Exercises Provided: Patient instructed to cont prior HOME EXERCISE PROGRAM. Exercises were reviewed and Sana was able to demonstrate them prior to the end of the session.  Sana demonstrated fair  understanding of the education provided. See Electronic Medical Record under Patient Instructions for exercises provided during therapy sessions    ASSESSMENT     Tender to palpation left clavicle. Increased tone noted in bilateral upper trapezius and levator scapulae. Hypomobility noted in lower cervical spinal segments with mobilizations.    Sana Is progressing well towards her goals.   Patient prognosis is Good.     Patient will continue to benefit from skilled outpatient physical therapy to address the deficits listed in the problem list box on initial evaluation, provide patient/family education and to maximize patient's level of independence in the home and community environment.     Patient's spiritual, cultural and educational needs considered and Patient agreeable to plan of care and goals.     Anticipated barriers to physical therapy:  concussion symptoms, multiple therapies; multiple body parts involved     Goals:      Short term goals: In 4 weeks,  Goal Status   Patient will be independent with home exercise program to promote improved therapy outcomes.  MET   2. Patient will perform palloff press with good control to demonstrate improved core strength  in progress   3. Patient will improve bilateral lower extremity Manual Muscle test to 4/5 to demonstrate improved strength for functional tasks including dressing, bathing, grooming, walking, stairs and transitional movements.   in progress   4. Patient will improve shoulder flexion and cervical range of motion by 10 degrees to improve functional mobility including dressing, bathing, grooming, walking, stairs and transitional movements.   in progress   5. Patient will improve TUG test to 15 sec to improve walking speed for functional community ambulation MET   6. Patient will improve 5x sit<>stand to 15 to improve functional strength and promote mobility.   MET    7. Patient will require minimal verbal cueing from PT for proper scapular retraction in order to improve postural awareness.  in progress         Long term goals: In 8 weeks, Goal Status   8. Patient will be independent with progressed home exercise program to self manage symptoms.   in progress   9. Patient will improve bilateral upper extremity/lower extremity Manual Muscle test to 5/5 to improve strength for functional tasks including dressing, bathing, walking, stairs and transitional movements.   in progress   10. Patient will report walking for 30 minutes with <2/10 pain 5x/week to promote healthy lifestyle and regular physical exercise.   in progress   11. Patient will improve Lumbar Focus On therapeutic Outcomes (FOTO) from 46% to 68% to decrease perceived limitation with maintaining/changing body position.   in progress   12. Patient will improve Neck Focus On therapeutic Outcomes (FOTO) from 36% to 59% to decrease perceived  limitation with maintaining/changing body position.   in progress   13. Patient will improve TUG test to 11 sec to improve walking speed for functional community ambulation  in progress   14. Patient will improve bilateral single leg stance to 15 seconds to decrease fall risk and improve ambulation.  in progress   15. Patient will improve 5x sit<>stand to 11 seconds to improve functional strength and promote mobility.  In progress   16. Patient will improve 30 second sit<>stand to 15 reps to improve functional strength and promote mobility.  in progress   17. Patient will lift 10-15 lbs with <2/10 pain to move her plants and perform household chores.   in progress        PLAN     Continue with postural reeducation, neck range of motion, neck strengthening, and dry needling      Updated Certification Period: 10/9/2024 to 1/4/2025   Recommended Treatment Plan: 1 times per week for 12 weeks:  Electrical Stimulation , Manual Therapy, Moist Heat/ Ice, Neuromuscular Re-ed, Patient Education, Therapeutic Activities, Therapeutic Exercise, and dry needling    Alcon Estrada, PT

## 2024-10-17 ENCOUNTER — CLINICAL SUPPORT (OUTPATIENT)
Dept: REHABILITATION | Facility: HOSPITAL | Age: 54
End: 2024-10-17
Payer: COMMERCIAL

## 2024-10-17 DIAGNOSIS — R42 DIZZINESS: ICD-10-CM

## 2024-10-17 DIAGNOSIS — R68.89 DECREASED FUNCTIONAL ACTIVITY TOLERANCE: ICD-10-CM

## 2024-10-17 DIAGNOSIS — R41.841 COGNITIVE COMMUNICATION DISORDER: Primary | ICD-10-CM

## 2024-10-17 DIAGNOSIS — H81.8X9 DEFICIT OF VESTIBULO-OCULAR REFLEX: Primary | ICD-10-CM

## 2024-10-17 DIAGNOSIS — R26.89 BALANCE PROBLEM: ICD-10-CM

## 2024-10-17 PROCEDURE — 97130 THER IVNTJ EA ADDL 15 MIN: CPT | Mod: PO

## 2024-10-17 PROCEDURE — 97530 THERAPEUTIC ACTIVITIES: CPT | Mod: PO

## 2024-10-17 PROCEDURE — 97112 NEUROMUSCULAR REEDUCATION: CPT | Mod: PO

## 2024-10-17 PROCEDURE — 97129 THER IVNTJ 1ST 15 MIN: CPT | Mod: PO

## 2024-10-17 NOTE — PROGRESS NOTES
[facility]  Speech Therapy Treatment Note- Neurological Rehabilitation     Date: 10/17/2024     Name: Heather Scheuermann [place]   MRN: [id number]   Therapy Diagnosis:   Encounter Diagnosis   Name Primary?    Cognitive communication disorder Yes     Physician: Loida Batres MD  Physician Orders: Ambulatory Referral to Speech Therapy   Medical Diagnosis:   - S06.0XAA (ICD-10-CM) - Concussion with unknown loss of consciousness status, initial encounter  - S13.4XXA (ICD-10-CM) - Whiplash injuries, initial encounter  -R41.89 (ICD-10-CM) - Cognitive changes    Visit #/ Visits Authorized: 1/20 (plus evaluation)  Date of Evaluation:  9/17/24  Insurance Authorization Period: 9/5/24-12/31/24  Plan of Care Expiration Date:    11/1/24  Extended Plan of Care:  n/a   Progress Note: due 10/17/24     Time In:  0845  Time Out:  0920  Total Billable Time: 35 minutes      Precautions: Standard    Subjective   Patient reports: improvement overall. States some days she just doesn't have the capacity to complete tasks. Discussed memory failures over the last week.   She was not compliant to home exercise program as it was not yet established - established today   Response to previous treatment: good  Pain Scale: 3/10 on a Visual Analog Scale currently.  Pain Location: headache behind eyebrows, 5/10 for neck pain  .   Objective     TIMED  Procedure Min.   Cognitive Therapeutic Interventions, first 15 minutes CPT 42396  15   Cognitive Therapeutic Interventions, each additional 15 minutes CPT 23320  20            Short Term Goals: (2 weeks) Current Progress:   1. Patient will use Goal Plan Action Review strategy to complete moderate to complex reasoning, planning, or organization tasks with 90% accuracy independently to improve functional executive function skills.  Progressing/ Not Met 10/17/2024   Goal Plan Action Review strategy was introduced to aid in executive functioning deficits. Example of this  strategy was provided by SLP.     Goal Plan Action Review Strategy for Planning  Goal: what is your goal? What are you trying to accomplish?  Plan: what are the specific steps to get there?  Action: try to follow your plan  Review: How did it work?      Patient completed verbally with with memory failures throughout the week.    Minimal cues      2.Patient will independently implement cognitive/sensory rest periods throughout day after identifying cognitive fatigue including limiting sensory input (sound, light, etc.)  Patient is completing independently and noting improvement.    Goal Met 10/17/2024     3.Patient will independently generate strategies to improve ability to complete tasks with enhanced accuracy and time, based on review of objective previous performance on trials of functional tasks with 80% accuracy.  Patient completed complex deductive reasoning puzzle x2 independently     Initiated verbal and visual strategies as needed independently     Goal Met 10/17/2024          Patient Education and Home Program   Patient educated regarding the followin. Discharge from Speech Therapy     Home program established: yes-review mindfulness resources  Patient verbalized understanding to all above education provided.     See Electronic Medical Record under Patient Instructions for exercises provided throughout therapy.  Assessment   See discharge summary     Patient prognosis is Good. Patient will continue to benefit from skilled outpatient speech and language therapy to address the deficits listed in the problem list on initial evaluation, provide patient/family education and to maximize patient's level of independence in the home and community environment.   Medical necessity is demonstrated by the following IMPAIRMENTS:  Cognition: Deficits in executive functioning places Sana at risk of decline in quality of life.    Barriers to Therapy: none identified   Patient's spiritual, cultural and educational  needs considered and patient agreeable to plan of care and goals.  Plan   Discharge from Speech Therapy     Layla Benton M.A., L-SLP, CCC-SLP, CBIS  Speech Language Pathologist  Certified Brain Injury Specialist  10/17/2024

## 2024-10-17 NOTE — PLAN OF CARE
Outpatient Therapy Discharge Summary   Discharge Date: 10/17/2024   Name: Heather Scheuermann [place]  Clinic Number: [id number]  Therapy Diagnosis:   Encounter Diagnosis   Name Primary?    Cognitive communication disorder Yes     Physician: Loida Batres MD  Physician Orders: Ambulatory Referral to Speech Therapy   Medical Diagnosis:   - S06.0XAA (ICD-10-CM) - Concussion with unknown loss of consciousness status, initial encounter  - S13.4XXA (ICD-10-CM) - Whiplash injuries, initial encounter  -R41.89 (ICD-10-CM) - Cognitive changes  Evaluation Date: 9/17/2024    Date of Last visit: 10/17/2024   Total Visits Received: 4  Cancelled Visits: 0  No Show Visits: 0    Assessment    Assessment of Current Status: Patient has made significant progress with Speech Therapy. She is able to implement strategies and use cognitive pacing independently. Memory deficits at this time are more related to normal memory failures and physical symptoms. Discharge is appropriate at this time and patient in agreement.      Goals:   Short Term Goals: (2 weeks) Current Progress:   1. Patient will use Goal Plan Action Review strategy to complete moderate to complex reasoning, planning, or organization tasks with 90% accuracy independently to improve functional executive function skills. Not met       2.Patient will independently implement cognitive/sensory rest periods throughout day after identifying cognitive fatigue including limiting sensory input (sound, light, etc.)  Goal Met 10/17/2024     3.Patient will independently generate strategies to improve ability to complete tasks with enhanced accuracy and time, based on review of objective previous performance on trials of functional tasks with 80% accuracy.  Goal Met 10/17/2024         Long Term Goals:  Long Term Goals: (4 weeks) Current Progress:   1. Patient will demonstrate use of self awareness,  goal setting, and  self-monitoring during daily living activities to improve safety and  awareness in functional living environment.    Goal Met 10/17/2024          Discharge reason: Patient has reached the maximum rehab potential for the present time    Plan   This patient is discharged from Speech Therapy    Layla Benton CCC-SLP   10/17/2024

## 2024-10-17 NOTE — PROGRESS NOTES
"    [facility]   Physical Therapy Treatment Note    Name: Heather Scheuermann [place]  Clinic Number: [id number]    Therapy Diagnosis:   Encounter Diagnoses   Name Primary?    Deficit of vestibulo-ocular reflex Yes    Dizziness     Balance problem     Decreased functional activity tolerance        Physician: Loida Batres MD    Visit Date: 10/17/2024    Physician Orders: PT Eval and Treat;   Order comments: Referral for vestibular therapy.   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]   Imbalance [R26.89]  Evaluation Date: 8/21/2024  Authorization Period Expiration: 12/31/24  Plan of Care Expiration: 10/18/2024  Updated Plan of Care Expiration: 11/29/2024  Visit # / Visits authorized: 16/20 (11th vestibular PT visit)    Progress Note Due: 10/23/24    PTA Visit #: 0/5     Time In: 0803   Time Out: 0844  Total Billable Time: 41 minutes    SUBJECTIVE     Pt reports: She is feeling okay this morning. Reports having mild headache and neck/jaw pain but within a tolerable range.     Response to previous treatment: tolerated well  Functional change: ongoing    Pain: 3/10  Location: headache    OBJECTIVE     Objective Measures updated on 9/23/24.     Treatment     Sana received the treatments listed below:      Sana received therapeutic exercises to develop endurance for 00 minutes including:    NP      Sana participated in neuromuscular re-education activities to improve: Balance and motion tolerance for 25 minutes. The following activities were included:    Oculomotor and Gaze Stabilization Adaptation Training:  Gaze shifts: Simple "X" target  2 x 30s, Horizontal, self-selected speed  2 x 30s, Vertical, self-selected speed    VORx1: Simple "X" target  3 x 30s, Horizontal, 80 bpm, X target -- moderate dizziness reported following   3 x 30s, Vertical, 80 bpm, X target -- better " "tolerance than horizontal    Balance Training: standby by assist   // bars:   Wooden rocker board:   2 x 30", working board in A/P directions  2 x 30", working board in left/right directions    Large bosu (flat side up):  2 x 30" B, Modified SLS with CL limb on bosu, eyes open    --> Total activity time includes rest breaks as needed between sets/tasks       Sana participated in dynamic functional therapeutic activities to improve functional performance for 16 minutes, including:    Functional Motion Tolerance:  Hallway ambulation:  2 x 100 feet, walking + up/down head nods with gaze stabilization on target in distance  2 x 100 feet, walking + left/right head nods with gaze stabilization on target in distance  1 x 100 feet each direction, Forward <> backward walking + ball toss <> SPT    Dart transfer: at dart board  1 x 11 darts each direction, pt transferring darts stool <> dart board via trunk bending and quarter body turns    --> Total activity time includes rest break following to allow symptoms to return to baseline      Patient Education and Home Exercises     Home Exercises Provided and Patient Education Provided     Education provided:   8/27: - Visual demonstration, verbal instruction, and written handout provided for interventions included as part of pt's home exercise program   - PT provided education on monitoring her symptoms throughout the day to take appropriate rest breaks and improve her tolerance to functional activities    Written Home Exercises Provided: yes. Exercises were reviewed and Sana was able to demonstrate them prior to the end of the session.  Sana demonstrated good  understanding of the education provided. See EMR under Patient Instructions for exercises provided during therapy sessions    ASSESSMENT   Sana tolerated today's session well. She was able to progress the number of sets with her oculomotor interventions without any undue fatigue or strain noted. She was also " able to progress to backwards walking on today for habituation without any significant increase in her symptoms and may benefit from continued progressions as tolerated. She continues to be appropriate for skilled physical therapy services to emphasizing oculomotor, balance, and motion tolerance training.     Sana Is progressing well towards her goals.   Pt prognosis is Good.     Pt will continue to benefit from skilled outpatient physical therapy to address the deficits listed in the problem list box on initial evaluation, provide pt/family education and to maximize pt's level of independence in the home and community environment.     Pt's spiritual, cultural and educational needs considered and pt agreeable to plan of care and goals.     Anticipated barriers to physical therapy: apprehension/increased fear of falling     Goals:   Short Term Goals: 4 weeks   Pt will receive an individualized home exercise program. Met 8/27/24  Pt will tolerate performing smooth pursuits at >/= 45 bpm for at least 30 seconds to show improved visual tracking. Progressing  Pt will tolerate performing saccades at >/= 80 bpm to for at least 30 seconds to show improved visual scanning ability. Progressing  Pt will tolerate performing VOR x1 at >/= 70 bpm for at least 30 seconds to show improved gaze stabilization and motion tolerance. Progressing  Pt will improve convergence point to </= 36 cm for improved near point focus. Met 9/23/24  Pt will improve postural control with LA condition 6 score of at least 15 s with minimal sway for decreased fall risk with standing ADLs. Met 9/23/24  PT will perform Functional Gait Assessment and other tests of motion tolerance as indicated to assess pt tolerance to functional activities. Met 8/27/24  PT will assess CRT's as indicated per pt symptoms. Ongoing     Long Term Goals: 8 weeks   Pt will be independent with an individualized home exercise program. Ongoing  Pt will tolerate performing  smooth pursuits at >/= 60 bpm for at least 30 seconds to show improved visual tracking. Progressing  Pt will tolerate performing saccades at >/= 90 bpm to for at least 30 seconds to show improved visual scanning ability. Progressing  Pt will tolerate performing VOR x1 at >/= 80 bpm for at least 30 seconds to show improved gaze stabilization and motion tolerance. Progressing  Pt will improve convergence point to </= 25 cm for improved near point focus. Progressing  Pt will improve postural control with LA condition 2 score of at least 30 s with minimal sway for decreased fall risk with standing ADLs. Ongoing  Pt will improve postural control with LA condition 4 score of at least 10 s with minimal sway for decreased fall risk with standing ADLs. Ongoing  Pt will improve postural control with LA condition 6 score of at least 20 s with minimal sway for decreased fall risk with standing ADLs. Met 9/23/24  Patient will improve her FOTO score from 48%  to at least 60% for improved self perception of functional mobility.(score 0-100, high score indicates greater level of function) Ongoing  Pt will improve Functional Gait Assessment (FGA) score to at least 25/30 for increased independence with home and community ambulation. Progressing  New 9/23/24: Pt will improve postural control with LA condition 6 score of at least 30 s with minimal sway for decreased fall risk with standing ADLs      PLAN     Extend PT POC from 10/18/24 to 11/29/24    Continue to progress oculomotor, balance, and motion tolerance interventions as tolerated    Ruth Rodgers, PT

## 2024-10-21 NOTE — PROGRESS NOTES
"  [facility]   Physical Therapy Updated POC    Name: Heather Scheuermann [place]  Clinic Number: [id number]    Therapy Diagnosis:   Encounter Diagnoses   Name Primary?    Deficit of vestibulo-ocular reflex Yes    Dizziness     Balance problem     Decreased functional activity tolerance      Physician: Loida Batres MD    Visit Date: 10/22/2024    Physician Orders: PT Eval and Treat;   Order comments: Referral for vestibular therapy.   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]   Imbalance [R26.89]  Evaluation Date: 8/21/2024  Authorization Period Expiration: 12/31/24  Plan of Care Expiration: 11/29/2024  Updated Plan of Care Expiration: 12/27/2024  Visit # / Visits authorized: 19/32 (11th vestibular PT visit)    Progress Note Due: 11/22/24    PTA Visit #: 0/5     Time In: 0803   Time Out: 0843  Total Billable Time: 40 minutes    SUBJECTIVE     Pt reports: She is feeling okay this morning but is a little tired. Denies any symptoms at the start of her session    Response to previous treatment: tolerated well  Functional change: ongoing    Pain: 0/10  Location: headache    OBJECTIVE     Objective Measures updated on 10/22/24.     Tracking/Smooth Pursuits: intact, able to perform at 60 bpm with no reported increase in symptoms  Saccades: Impaired: able to perform at 80 bpm but with increased visual fatigue noted at end of trial   Convergence: impaired; able to focus at 21 cm but has to refocus on target as she begins to show deficits before this point     VOR 1: Impaired: able to perform at 90 bpm with mild motion tolerance reported; minimal cervical ROM noted due to apprehension re: dizziness         [place] SENSORY ORGANIZATION PERFORMANCE (SOP) TEST: 9/23/24  (N=normal, S = sway, F= Fall; hold each position for 30")  Condition 1: (firm surface/feet together/eyes open) P  Condition " "2: (firm surface/feet together/eyes closed) P - Min-mod sway and increased nause  Condition 3: (firm surface/feet in tandem/eyes open) P - right foot leading  Condition 4: (firm surface/feet in tandem/eyes closed) F - 6 seconds  Condition 5: (soft surface/feet together/eyes open) P  Condition 6: (soft surface/feet together/eyes closed) F - 20 seconds     [place] SENSORY ORGANIZATION PERFORMANCE (SOP) TEST: 10/22/24  (N=normal, S = sway, F= Fall; hold each position for 30")  Condition 1: (firm surface/feet together/eyes open) P  Condition 2: (firm surface/feet together/eyes closed) P  Condition 3: (firm surface/feet in tandem/eyes open) P bilaterally - Mod sway with left leading  Condition 4: (firm surface/feet in tandem/eyes closed) F - 16 seconds with right leading  Condition 5: (soft surface/feet together/eyes open) P  Condition 6: (soft surface/feet together/eyes closed) P - min sway      Functional Gait Assessment:   1. Gait on level surface =  2    (3) Normal: less than 5.5 sec, no A.D., no imbalance, normal gait pattern, deviates< 6in   (2) Mild impairment: 7-5.6 sec, uses A.D., mild gait deviations, or deviates 6-10 in   (1) Moderate impairment: > 7 sec, slow speed, imbalance, deviates 10-15 in.   (0) Severe impairment: needs assist, deviates >15 in, reach/touch wall  2. Change in Gait Speed = 3   (3) Normal: smooth change w/o loss of balance or gait deviation, deviates < 6 in, significant difference between speeds   (2) Mild impairment: changes speed, but demonstrates mild gait deviations, deviates 6-10 in, OR no deviations but unable to significantly speed, OR uses A.D.   (1) Moderate impairment: minor changes to speed, OR changes speed w/ significant deviations, deviates 10-15 in, OR  Changes speed , but loses balance & recovers   (0) Severe impairment: cannot change speed, deviates >15 in, or loses balance & needs assist  3. Gait with horizontal head turns  = 2   (3) Normal: no change in gait, deviates <6 " in   (2) Mild impairment: slight change in speed, deviates 6-10 in, OR uses A.D.   (1) Moderate impairment: moderate change in speed, deviates 10-15 in   (0) Severe impairment: severe disruption of gait, deviates >15in  4. Gait with vertical head turns = 3   (3) Normal: no change in gait, deviates <6 in   (2) Mild impairment: slight change in speed, deviates 6-10 in OR uses A.D.   (1) Moderate impairment: moderate change in speed, deviates 10-15 in   (0) Severe impairment: severe disruption of gait, deviates >15 in  5. Gait with pivot turns = 3   (3) Normal: performs safely in 3 sec, no LOB   (2) Mild impairment: performs in >3 sec & no LOB, OR turns safely & requires several steps to regain LOB   (1) Moderate impairment: turns slow, OR requires several small steps for balance following turn & stop   (0) Severe impairment: cannot turn safely, needs assist  6. Step over obstacle = 3   (3) Normal: steps over 2 stacked boxes w/o change in speed or LOB   (2) Mild impairment: able to step over 1 box w/o change in speed or LOB   (1) Moderate impairment: steps over 1 box but must slow down, may require VC   (0) Severe impairment: cannot perform w/o assist  7. Gait with Narrow ANITA = 3   (3) Normal: 10 steps no staggering   (2) Mild impairment: 7-9 steps   (1) Moderate impairment: 4-7 steps   (0) Severe impairment: < 4 steps or cannot perform w/o assist  8. Gait with eyes closed = 1   (3) Normal: < 7 sec, no A.D., no LOB, normal gait pattern, deviates <6 in   (2) Mild impairment: 7.1-9 sec, mild gait deviations, deviates 6-10 in   (1) Moderate impairment: > 9 sec, abnormal pattern, LOB, deviates 10-15 in   (0) Severe impairment: cannot perform w/o assist, LOB, deviates >15in  9. Ambulating Backwards = 2   (3) Normal: no A.D., no LOB, normal gait pattern, deviates <6in   (2) Mild impairment: uses A.D., slower speed, mild gait deviations, deviates 6-10 in   (1) Moderate impairment: slow speed, abnormal gait pattern, LOB,  deviates 10-15 in   (0) Severe impairment: severe gait deviations or LOB, deviates >15in  10. Steps = 3   (3) Normal: alternating feet, no rail   (2) Mild Impairment: alternating feet, uses rail   (1) Moderate impairment: step-to, uses rail   (0) Severe impairment: cannot perform safely    10/22/24: Score 25/30   9/23/24: Score 24/30     Score:   <22/30 fall risk   <20/30 fall risk in older adults   <18/30 fall risk in Parkinsons       FOTO Survey: 50%      Treatment     Sana received the treatments listed below:      Sana received therapeutic exercises to develop endurance for 00 minutes including:    NP      Sana participated in neuromuscular re-education activities to improve: Balance and motion tolerance for 00 minutes. The following activities were included:    NP      Sana participated in dynamic functional therapeutic activities to improve functional performance for 40 minutes, including:    Objective testing listed above  FOTO Survey    --> Time included for rest breaks between tasks/assessments as needed.      Patient Education and Home Exercises     Home Exercises Provided and Patient Education Provided     Education provided:   8/27: - Visual demonstration, verbal instruction, and written handout provided for interventions included as part of pt's home exercise program   - PT provided education on monitoring her symptoms throughout the day to take appropriate rest breaks and improve her tolerance to functional activities    Written Home Exercises Provided: yes. Exercises were reviewed and Sana was able to demonstrate them prior to the end of the session.  Sana demonstrated good  understanding of the education provided. See EMR under Patient Instructions for exercises provided during therapy sessions    ASSESSMENT     Heather Scheuermann Hammond tolerated today's session well with a focus on reassessment of progress towards goals. She was able to tolerate performing the outcome measures  listed above with improvements noted in her tolerance for oculomotor interventions, her static balance reactions, and her dynamic balance with gait activities. To date, she has met 7/8 short and 9/13 long-term goals established at her initial evaluation and prior progress notes. She remains appropriate for skilled physical therapy services at their current frequency for an additional 4 weeks beyond her established plan of care emphasizing oculomotor, balance, and motion tolerance training.     Sana Is progressing well towards her goals.   Pt prognosis is Good.     Pt will continue to benefit from skilled outpatient physical therapy to address the deficits listed in the problem list box on initial evaluation, provide pt/family education and to maximize pt's level of independence in the home and community environment.     Pt's spiritual, cultural and educational needs considered and pt agreeable to plan of care and goals.     Anticipated barriers to physical therapy: apprehension/increased fear of falling     Goals:   Short Term Goals: 4 weeks   Pt will receive an individualized home exercise program. Met 8/27/24  Pt will tolerate performing smooth pursuits at >/= 45 bpm for at least 30 seconds to show improved visual tracking. Met 10/22/24  Pt will tolerate performing saccades at >/= 80 bpm to for at least 30 seconds to show improved visual scanning ability. Met 10/22/24  Pt will tolerate performing VOR x1 at >/= 70 bpm for at least 30 seconds to show improved gaze stabilization and motion tolerance. Met 10/22/24  Pt will improve convergence point to </= 36 cm for improved near point focus. Met 9/23/24  Pt will improve postural control with LA condition 6 score of at least 15 s with minimal sway for decreased fall risk with standing ADLs. Met 9/23/24  PT will perform Functional Gait Assessment and other tests of motion tolerance as indicated to assess pt tolerance to functional activities. Met 8/27/24  PT will  assess CRT's as indicated per pt symptoms. Ongoing     Long Term Goals: 8 weeks   Pt will be independent with an individualized home exercise program. Met 10/22/24  Pt will tolerate performing smooth pursuits at >/= 60 bpm for at least 30 seconds to show improved visual tracking. Met 10/22/24  Pt will tolerate performing saccades at >/= 90 bpm to for at least 30 seconds to show improved visual scanning ability. Progressing  Pt will tolerate performing VOR x1 at >/= 80 bpm for at least 30 seconds to show improved gaze stabilization and motion tolerance. Met 10/22/24  Pt will improve convergence point to </= 25 cm for improved near point focus. Met 10/22/24  Pt will improve postural control with LA condition 2 score of at least 30 s with minimal sway for decreased fall risk with standing ADLs. Met 10/22/24  Pt will improve postural control with LA condition 4 score of at least 10 s with minimal sway for decreased fall risk with standing ADLs. Met 10/22/24  Pt will improve postural control with LA condition 6 score of at least 20 s with minimal sway for decreased fall risk with standing ADLs. Met 9/23/24  Patient will improve her FOTO score from 48%  to at least 60% for improved self perception of functional mobility.(score 0-100, high score indicates greater level of function) Ongoing  Pt will improve Functional Gait Assessment (FGA) score to at least 25/30 for increased independence with home and community ambulation. Met 10/22/24  Pt will improve postural control with LA condition 6 score of at least 30 s with minimal sway for decreased fall risk with standing ADLs Met 10/22/24  New 10/22/24: Pt will tolerate performing VOR x1 at >/= 100 bpm for at least 30 seconds to show improved gaze stabilization and motion tolerance.  New 10/22/24: Pt will improve convergence point to </= 18 cm for improved near point focus.      PLAN     Extend PT POC from 10/18/24 to 11/29/24    Try MMSQ at next visit    Continue to  progress oculomotor, balance, and motion tolerance interventions as tolerated    Ruth Rodgers, PT

## 2024-10-22 ENCOUNTER — CLINICAL SUPPORT (OUTPATIENT)
Dept: REHABILITATION | Facility: HOSPITAL | Age: 54
End: 2024-10-22
Payer: COMMERCIAL

## 2024-10-22 DIAGNOSIS — H81.8X9 DEFICIT OF VESTIBULO-OCULAR REFLEX: Primary | ICD-10-CM

## 2024-10-22 DIAGNOSIS — R26.89 BALANCE PROBLEM: ICD-10-CM

## 2024-10-22 DIAGNOSIS — R68.89 DECREASED FUNCTIONAL ACTIVITY TOLERANCE: ICD-10-CM

## 2024-10-22 DIAGNOSIS — R42 DIZZINESS: ICD-10-CM

## 2024-10-22 PROCEDURE — 97530 THERAPEUTIC ACTIVITIES: CPT | Mod: PO

## 2024-10-22 NOTE — PLAN OF CARE
"  [facility]   Physical Therapy Updated POC    Name: Heather Scheuermann Hammond  Clinic Number: [id number]    Therapy Diagnosis:   Encounter Diagnoses   Name Primary?    Deficit of vestibulo-ocular reflex Yes    Dizziness     Balance problem     Decreased functional activity tolerance      Physician: Loida Batres MD    Visit Date: 10/22/2024    Physician Orders: PT Eval and Treat;   Order comments: Referral for vestibular therapy.   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]   Imbalance [R26.89]  Evaluation Date: 8/21/2024  Authorization Period Expiration: 12/31/24  Plan of Care Expiration: 11/29/2024  Updated Plan of Care Expiration: 12/27/2024  Visit # / Visits authorized: 19/32 (11th vestibular PT visit)    Progress Note Due: 11/22/24    PTA Visit #: 0/5     Time In: 0803   Time Out: 0843  Total Billable Time: 40 minutes    SUBJECTIVE     Pt reports: She is feeling okay this morning but is a little tired. Denies any symptoms at the start of her session    Response to previous treatment: tolerated well  Functional change: ongoing    Pain: 0/10  Location: headache    OBJECTIVE     Objective Measures updated on 10/22/24.     Tracking/Smooth Pursuits: intact, able to perform at 60 bpm with no reported increase in symptoms  Saccades: Impaired: able to perform at 80 bpm but with increased visual fatigue noted at end of trial   Convergence: impaired; able to focus at 21 cm but has to refocus on target as she begins to show deficits before this point     VOR 1: Impaired: able to perform at 90 bpm with mild motion tolerance reported; minimal cervical ROM noted due to apprehension re: dizziness         [place] SENSORY ORGANIZATION PERFORMANCE (SOP) TEST: 9/23/24  (N=normal, S = sway, F= Fall; hold each position for 30")  Condition 1: (firm surface/feet together/eyes open) P  Condition " "2: (firm surface/feet together/eyes closed) P - Min-mod sway and increased nause  Condition 3: (firm surface/feet in tandem/eyes open) P - right foot leading  Condition 4: (firm surface/feet in tandem/eyes closed) F - 6 seconds  Condition 5: (soft surface/feet together/eyes open) P  Condition 6: (soft surface/feet together/eyes closed) F - 20 seconds     [place] SENSORY ORGANIZATION PERFORMANCE (SOP) TEST: 10/22/24  (N=normal, S = sway, F= Fall; hold each position for 30")  Condition 1: (firm surface/feet together/eyes open) P  Condition 2: (firm surface/feet together/eyes closed) P  Condition 3: (firm surface/feet in tandem/eyes open) P bilaterally - Mod sway with left leading  Condition 4: (firm surface/feet in tandem/eyes closed) F - 16 seconds with right leading  Condition 5: (soft surface/feet together/eyes open) P  Condition 6: (soft surface/feet together/eyes closed) P - min sway      Functional Gait Assessment:   1. Gait on level surface =  2    (3) Normal: less than 5.5 sec, no A.D., no imbalance, normal gait pattern, deviates< 6in   (2) Mild impairment: 7-5.6 sec, uses A.D., mild gait deviations, or deviates 6-10 in   (1) Moderate impairment: > 7 sec, slow speed, imbalance, deviates 10-15 in.   (0) Severe impairment: needs assist, deviates >15 in, reach/touch wall  2. Change in Gait Speed = 3   (3) Normal: smooth change w/o loss of balance or gait deviation, deviates < 6 in, significant difference between speeds   (2) Mild impairment: changes speed, but demonstrates mild gait deviations, deviates 6-10 in, OR no deviations but unable to significantly speed, OR uses A.D.   (1) Moderate impairment: minor changes to speed, OR changes speed w/ significant deviations, deviates 10-15 in, OR  Changes speed , but loses balance & recovers   (0) Severe impairment: cannot change speed, deviates >15 in, or loses balance & needs assist  3. Gait with horizontal head turns  = 2   (3) Normal: no change in gait, deviates <6 " in   (2) Mild impairment: slight change in speed, deviates 6-10 in, OR uses A.D.   (1) Moderate impairment: moderate change in speed, deviates 10-15 in   (0) Severe impairment: severe disruption of gait, deviates >15in  4. Gait with vertical head turns = 3   (3) Normal: no change in gait, deviates <6 in   (2) Mild impairment: slight change in speed, deviates 6-10 in OR uses A.D.   (1) Moderate impairment: moderate change in speed, deviates 10-15 in   (0) Severe impairment: severe disruption of gait, deviates >15 in  5. Gait with pivot turns = 3   (3) Normal: performs safely in 3 sec, no LOB   (2) Mild impairment: performs in >3 sec & no LOB, OR turns safely & requires several steps to regain LOB   (1) Moderate impairment: turns slow, OR requires several small steps for balance following turn & stop   (0) Severe impairment: cannot turn safely, needs assist  6. Step over obstacle = 3   (3) Normal: steps over 2 stacked boxes w/o change in speed or LOB   (2) Mild impairment: able to step over 1 box w/o change in speed or LOB   (1) Moderate impairment: steps over 1 box but must slow down, may require VC   (0) Severe impairment: cannot perform w/o assist  7. Gait with Narrow ANITA = 3   (3) Normal: 10 steps no staggering   (2) Mild impairment: 7-9 steps   (1) Moderate impairment: 4-7 steps   (0) Severe impairment: < 4 steps or cannot perform w/o assist  8. Gait with eyes closed = 1   (3) Normal: < 7 sec, no A.D., no LOB, normal gait pattern, deviates <6 in   (2) Mild impairment: 7.1-9 sec, mild gait deviations, deviates 6-10 in   (1) Moderate impairment: > 9 sec, abnormal pattern, LOB, deviates 10-15 in   (0) Severe impairment: cannot perform w/o assist, LOB, deviates >15in  9. Ambulating Backwards = 2   (3) Normal: no A.D., no LOB, normal gait pattern, deviates <6in   (2) Mild impairment: uses A.D., slower speed, mild gait deviations, deviates 6-10 in   (1) Moderate impairment: slow speed, abnormal gait pattern, LOB,  deviates 10-15 in   (0) Severe impairment: severe gait deviations or LOB, deviates >15in  10. Steps = 3   (3) Normal: alternating feet, no rail   (2) Mild Impairment: alternating feet, uses rail   (1) Moderate impairment: step-to, uses rail   (0) Severe impairment: cannot perform safely    10/22/24: Score 25/30   9/23/24: Score 24/30     Score:   <22/30 fall risk   <20/30 fall risk in older adults   <18/30 fall risk in Parkinsons       FOTO Survey: 50%      Treatment     Sana received the treatments listed below:      Sana received therapeutic exercises to develop endurance for 00 minutes including:    NP      Sana participated in neuromuscular re-education activities to improve: Balance and motion tolerance for 00 minutes. The following activities were included:    NP      Sana participated in dynamic functional therapeutic activities to improve functional performance for 40 minutes, including:    Objective testing listed above  FOTO Survey    --> Time included for rest breaks between tasks/assessments as needed.      Patient Education and Home Exercises     Home Exercises Provided and Patient Education Provided     Education provided:   8/27: - Visual demonstration, verbal instruction, and written handout provided for interventions included as part of pt's home exercise program   - PT provided education on monitoring her symptoms throughout the day to take appropriate rest breaks and improve her tolerance to functional activities    Written Home Exercises Provided: yes. Exercises were reviewed and Sana was able to demonstrate them prior to the end of the session.  Sana demonstrated good  understanding of the education provided. See EMR under Patient Instructions for exercises provided during therapy sessions    ASSESSMENT     Heather Scheuermann Hammond tolerated today's session well with a focus on reassessment of progress towards goals. She was able to tolerate performing the outcome measures  listed above with improvements noted in her tolerance for oculomotor interventions, her static balance reactions, and her dynamic balance with gait activities. To date, she has met 7/8 short and 9/13 long-term goals established at her initial evaluation and prior progress notes. She remains appropriate for skilled physical therapy services at their current frequency for an additional 4 weeks beyond her established plan of care emphasizing oculomotor, balance, and motion tolerance training.     Sana Is progressing well towards her goals.   Pt prognosis is Good.     Pt will continue to benefit from skilled outpatient physical therapy to address the deficits listed in the problem list box on initial evaluation, provide pt/family education and to maximize pt's level of independence in the home and community environment.     Pt's spiritual, cultural and educational needs considered and pt agreeable to plan of care and goals.     Anticipated barriers to physical therapy: apprehension/increased fear of falling     Goals:   Short Term Goals: 4 weeks   Pt will receive an individualized home exercise program. Met 8/27/24  Pt will tolerate performing smooth pursuits at >/= 45 bpm for at least 30 seconds to show improved visual tracking. Met 10/22/24  Pt will tolerate performing saccades at >/= 80 bpm to for at least 30 seconds to show improved visual scanning ability. Met 10/22/24  Pt will tolerate performing VOR x1 at >/= 70 bpm for at least 30 seconds to show improved gaze stabilization and motion tolerance. Met 10/22/24  Pt will improve convergence point to </= 36 cm for improved near point focus. Met 9/23/24  Pt will improve postural control with LA condition 6 score of at least 15 s with minimal sway for decreased fall risk with standing ADLs. Met 9/23/24  PT will perform Functional Gait Assessment and other tests of motion tolerance as indicated to assess pt tolerance to functional activities. Met 8/27/24  PT will  assess CRT's as indicated per pt symptoms. Ongoing     Long Term Goals: 8 weeks   Pt will be independent with an individualized home exercise program. Met 10/22/24  Pt will tolerate performing smooth pursuits at >/= 60 bpm for at least 30 seconds to show improved visual tracking. Met 10/22/24  Pt will tolerate performing saccades at >/= 90 bpm to for at least 30 seconds to show improved visual scanning ability. Progressing  Pt will tolerate performing VOR x1 at >/= 80 bpm for at least 30 seconds to show improved gaze stabilization and motion tolerance. Met 10/22/24  Pt will improve convergence point to </= 25 cm for improved near point focus. Met 10/22/24  Pt will improve postural control with LA condition 2 score of at least 30 s with minimal sway for decreased fall risk with standing ADLs. Met 10/22/24  Pt will improve postural control with LA condition 4 score of at least 10 s with minimal sway for decreased fall risk with standing ADLs. Met 10/22/24  Pt will improve postural control with LA condition 6 score of at least 20 s with minimal sway for decreased fall risk with standing ADLs. Met 9/23/24  Patient will improve her FOTO score from 48%  to at least 60% for improved self perception of functional mobility.(score 0-100, high score indicates greater level of function) Ongoing  Pt will improve Functional Gait Assessment (FGA) score to at least 25/30 for increased independence with home and community ambulation. Met 10/22/24  Pt will improve postural control with LA condition 6 score of at least 30 s with minimal sway for decreased fall risk with standing ADLs Met 10/22/24  New 10/22/24: Pt will tolerate performing VOR x1 at >/= 100 bpm for at least 30 seconds to show improved gaze stabilization and motion tolerance.  New 10/22/24: Pt will improve convergence point to </= 18 cm for improved near point focus.      PLAN     Extend PT POC from 10/18/24 to 11/29/24    Try MMSQ at next visit    Continue to  progress oculomotor, balance, and motion tolerance interventions as tolerated    Ruth Rodgers, PT

## 2024-10-22 NOTE — PROGRESS NOTES
"[facility]   Physical Therapy Treatment Note     Name: Heather Scheuermann Estill  Clinic Number: [id number]    Therapy Diagnosis:   Encounter Diagnoses   Name Primary?    Acute neck pain Yes    Acute bilateral low back pain without sciatica     Posture abnormality     Impaired functional mobility, balance, gait, and endurance        Physician: Loida Batres MD; Eunice Reynoso    Visit Date: 10/23/2024    Physician Orders: PT Evaluate and Treat - Cervical retraction scapula stabilization posture training and back and core exercises and home exercise program; Referral for lower neck/upper back to mid back PT with dry needling. No soft tissue manipulation of suboccipital region if you start to feel worse. All joint manipulation is fine.    Medical Diagnosis from Referral: Neck pain (M54.2); Acute midline low back pain without sciatica (M54.50)  Evaluation Date: 8/22/2024  Authorization Period Expiration: 12/31/2024  Plan of Care Certification Period: 8/22/2024 - 10/18/2024 --> updated 10/9/2024 - 1/4/2025  Visit # / Visits authorized: 20/32 (8 visits at this location)       Progress Note Due: 11/9/2024  FOCUS ON THERAPEUTIC OUTCOMES (FOTO): 9/25/2024 (4/5; 2)  PTA Visit #: 0/5     Precautions: Standard and concussion    Time In: 8:07 am  Time Out: 8:55 am  Total Billable Time: 43 minutes      SUBJECTIVE     Patient reports: still having some tightness in the neck and shoulders. Neuro therapy is going well. More of a throbbing tightness in the neck/shoulders    She was compliant with home exercise program.  Response to previous treatment: "Fine."  Functional change: "still the same."    Pain: 3/10  Location: bilateral shoulders/neck    OBJECTIVE     Objective measures updated at progress report unless otherwise noted.      TREATMENT       Sana received the treatments listed below:      received therapeutic exercises to develop strength and range of motion for 8 minutes " "including:  [] Pectoral stretch over towel roll x 3 minutes   [x] upper body ergometer 3' forward/3' reverse  [x] levator scapular stretches 2 x 30"  [] upper trapezius stretches 2 x 30"  [] sternocleidomastoid stretches 2 x 30"  [] suprascapular nerve glide x 10     received the following manual therapy techniques: Soft tissue Mobilization were applied to the: neck/low back for 00 minutes, including:   [] soft tissue mobilization bilateral sternocleidomastoid, bilateral upper trapezius, bilateral levator scapulae, bilateral temporalis  [] bilateral 1st rib mobilizations  [] bilateral scapular mobilizations  [] Bilateral upper trapezius positional release  [] Bilateral C5-C7 posterior/anterior mobilizations    received the following dynamic functional therapeutic activities to improve functional performance for  minutes, including:  [] Review of home exercise program  [] Education regarding dry needling, review of consent form   [] Re-assessment/updated plan of care     received the following neuromuscular re-education activities to improve: Muscle inhibition, Balance, Coordination, Kinesthetic, Sense, Proprioception, and Posture for 35 minutes. The following activities were included:  [x] cervical isometric with red band x 20 repetitions each: retraction, side bending  [x] cervical nods, rotation against ball on wall x 20 repetitions   [x] lower trapezius lift offs 2 x 10 repetitions   [x] serratus stars x 10 repetitions with red band     [] Chin tuck 20 x 3 second holds  [] Chin tuck + rotation x 10 each   [] Cervical retraction 20 x 3 second holds  [] Scapular retraction 5 second holds over towel roll x 2 minutes  [x] Seated horizontal abduction with red band  x 20   [x] Seated no money with red band x 20   [x] + Standing diagonals with red band x20 bilateral     [] Row with red theratube 2x 10   [] Shoulder extension with red theratube 2x 10     [] Bridge 2x 10   [] Lower trunk rotation x3 minutes  [] Supine " spike 3-ways x20 each   [] Standing hip abduction 2x 10 bilateral   [] Standing hip extension 2x 10 bilateral       [x] Application of TDN: Pt educated on benefits and potential side effects of dry needling. Educated pt on benefits, precautions, side effects following TDN. Educated pt to use heat following treatment sessions if pt is experiencing pain or soreness. Pt verbalized good understanding of education.  Pt signed written consent to dry needling. Pt gave verbal consent for DN    Patient received dry needling to the below listed muscles using 30 mm needles.  [x] Bilateral upper trapezius (fanning, not left in situ)  [x] + bilateral semispinalis capitus (removed on left due to pain)  [x] + bilateral splenius capitus   [x] + bilateral C3 parapsinals  [] Bilateral levator scapulae  [] Right C5   [] Bilateral temporalis    Winding performed every 5 minutes during treatment.       PATIENT EDUCATION AND HOME EXERCISES     Home Exercises Provided and Patient Education Provided     Education provided:   No new home exercise program      Written Home Exercises Provided: Patient instructed to cont prior HOME EXERCISE PROGRAM. Exercises were reviewed and Sana was able to demonstrate them prior to the end of the session.  Sana demonstrated fair  understanding of the education provided. See Electronic Medical Record under Patient Instructions for exercises provided during therapy sessions    ASSESSMENT   Initiated subocciptal points today with needling, removed semispinalis capitus on left due to pain. Upper trapezius fanning today with multiple muscle twitches. Patient reported soreness as expected after needling. Advised patient to monitor symptoms if she starts to feel worse afterwards and we will hold off on these points in the future.     aSna Is progressing well towards her goals.   Patient prognosis is Good.     Patient will continue to benefit from skilled outpatient physical therapy to address the  deficits listed in the problem list box on initial evaluation, provide patient/family education and to maximize patient's level of independence in the home and community environment.     Patient's spiritual, cultural and educational needs considered and Patient agreeable to plan of care and goals.     Anticipated barriers to physical therapy: concussion symptoms, multiple therapies; multiple body parts involved     Goals:      Short term goals: In 4 weeks,  Goal Status   Patient will be independent with home exercise program to promote improved therapy outcomes.  MET   2. Patient will perform palloff press with good control to demonstrate improved core strength  in progress   3. Patient will improve bilateral lower extremity Manual Muscle test to 4/5 to demonstrate improved strength for functional tasks including dressing, bathing, grooming, walking, stairs and transitional movements.   in progress   4. Patient will improve shoulder flexion and cervical range of motion by 10 degrees to improve functional mobility including dressing, bathing, grooming, walking, stairs and transitional movements.   in progress   5. Patient will improve TUG test to 15 sec to improve walking speed for functional community ambulation MET   6. Patient will improve 5x sit<>stand to 15 to improve functional strength and promote mobility.   MET    7. Patient will require minimal verbal cueing from PT for proper scapular retraction in order to improve postural awareness.  in progress         Long term goals: In 8 weeks, Goal Status   8. Patient will be independent with progressed home exercise program to self manage symptoms.   in progress   9. Patient will improve bilateral upper extremity/lower extremity Manual Muscle test to 5/5 to improve strength for functional tasks including dressing, bathing, walking, stairs and transitional movements.   in progress   10. Patient will report walking for 30 minutes with <2/10 pain 5x/week to promote  healthy lifestyle and regular physical exercise.   in progress   11. Patient will improve Lumbar Focus On therapeutic Outcomes (FOTO) from 46% to 68% to decrease perceived limitation with maintaining/changing body position.   in progress   12. Patient will improve Neck Focus On therapeutic Outcomes (FOTO) from 36% to 59% to decrease perceived limitation with maintaining/changing body position.   in progress   13. Patient will improve TUG test to 11 sec to improve walking speed for functional community ambulation  in progress   14. Patient will improve bilateral single leg stance to 15 seconds to decrease fall risk and improve ambulation.  in progress   15. Patient will improve 5x sit<>stand to 11 seconds to improve functional strength and promote mobility.  In progress   16. Patient will improve 30 second sit<>stand to 15 reps to improve functional strength and promote mobility.  in progress   17. Patient will lift 10-15 lbs with <2/10 pain to move her plants and perform household chores.   in progress        PLAN     Continue with postural reeducation, neck range of motion, neck strengthening, and dry needling      Updated Certification Period: 10/9/2024 to 1/4/2025   Recommended Treatment Plan: 1 times per week for 12 weeks:  Electrical Stimulation , Manual Therapy, Moist Heat/ Ice, Neuromuscular Re-ed, Patient Education, Therapeutic Activities, Therapeutic Exercise, and dry needling    Christen Guadalupe, PT

## 2024-10-23 ENCOUNTER — CLINICAL SUPPORT (OUTPATIENT)
Dept: REHABILITATION | Facility: OTHER | Age: 54
End: 2024-10-23
Payer: COMMERCIAL

## 2024-10-23 DIAGNOSIS — M54.50 ACUTE BILATERAL LOW BACK PAIN WITHOUT SCIATICA: ICD-10-CM

## 2024-10-23 DIAGNOSIS — M54.2 ACUTE NECK PAIN: Primary | ICD-10-CM

## 2024-10-23 DIAGNOSIS — Z74.09 IMPAIRED FUNCTIONAL MOBILITY, BALANCE, GAIT, AND ENDURANCE: ICD-10-CM

## 2024-10-23 DIAGNOSIS — R29.3 POSTURE ABNORMALITY: ICD-10-CM

## 2024-10-23 PROCEDURE — 97110 THERAPEUTIC EXERCISES: CPT | Mod: PN

## 2024-10-23 PROCEDURE — 97112 NEUROMUSCULAR REEDUCATION: CPT | Mod: PN

## 2024-10-24 ENCOUNTER — TELEPHONE (OUTPATIENT)
Dept: SPINE | Facility: CLINIC | Age: 54
End: 2024-10-24
Payer: COMMERCIAL

## 2024-10-30 ENCOUNTER — CLINICAL SUPPORT (OUTPATIENT)
Dept: REHABILITATION | Facility: OTHER | Age: 54
End: 2024-10-30
Payer: COMMERCIAL

## 2024-10-30 DIAGNOSIS — M54.2 ACUTE NECK PAIN: Primary | ICD-10-CM

## 2024-10-30 DIAGNOSIS — M54.50 ACUTE BILATERAL LOW BACK PAIN WITHOUT SCIATICA: ICD-10-CM

## 2024-10-30 DIAGNOSIS — Z74.09 IMPAIRED FUNCTIONAL MOBILITY, BALANCE, GAIT, AND ENDURANCE: ICD-10-CM

## 2024-10-30 DIAGNOSIS — R29.3 POSTURE ABNORMALITY: ICD-10-CM

## 2024-10-30 PROCEDURE — 97112 NEUROMUSCULAR REEDUCATION: CPT | Mod: PN

## 2024-11-05 ENCOUNTER — PATIENT MESSAGE (OUTPATIENT)
Dept: NEUROLOGY | Facility: CLINIC | Age: 54
End: 2024-11-05
Payer: COMMERCIAL

## 2024-11-05 ENCOUNTER — TELEPHONE (OUTPATIENT)
Dept: NEUROLOGY | Facility: CLINIC | Age: 54
End: 2024-11-05
Payer: COMMERCIAL

## 2024-11-05 NOTE — TELEPHONE ENCOUNTER
COSMEW placed call to patient and left voicemail   COSMEW introduced himself as a therapist and  that supports patient's with neurological symptoms at the clinic at [facility].     ED reported he works closely with the neurologists and staff such as: Dr. Loida Batres     Requested call back or message in portal

## 2024-11-11 NOTE — PROGRESS NOTES
"[facility]   Physical Therapy Treatment Note     Name: Heather Scheuermann Hammond  Clinic Number: [id number]    Therapy Diagnosis:   Encounter Diagnoses   Name Primary?    Acute neck pain Yes    Acute bilateral low back pain without sciatica     Posture abnormality     Impaired functional mobility, balance, gait, and endurance      Physician: Loida Batres MD; Eunice Reynoso    Visit Date: 11/13/2024    Physician Orders: PT Evaluate and Treat - Cervical retraction scapula stabilization posture training and back and core exercises and home exercise program; Referral for lower neck/upper back to mid back PT with dry needling. No soft tissue manipulation of suboccipital region if you start to feel worse. All joint manipulation is fine.    Medical Diagnosis from Referral: Neck pain (M54.2); Acute midline low back pain without sciatica (M54.50)  Evaluation Date: 8/22/2024  Authorization Period Expiration: 12/31/2024  Plan of Care Certification Period: 10/9/2024 - 1/4/2025  Visit # / Visits authorized: 23/32 (10 visits at this location)       Progress Note Due: 12/13/2024  FOCUS ON THERAPEUTIC OUTCOMES (FOTO): 11/13/2024 (1/5; 3)  PTA Visit #: 0/5     Precautions: Standard and concussion    Time In: 8:16 am   Time Out: 9:00 am  Total Billable Time: 44 minutes      SUBJECTIVE     Patient reports: continues to report tightness in the neck and shoulder area. Maybe some days are better than others but overall has remained relatively unchanged. She is able to do all of her daily activities but reports the tightness really bothers her.     She was compliant with home exercise program.  Response to previous treatment: "Fine."  Functional change: "still the same."    Pain: 0/10 but tightness is 8/10   Location: bilateral shoulders/neck, low back, jaw    OBJECTIVE     Update:   Cervical active range of motion: Pain/Dysfunction with Movement:   Flexion: 43 degrees  pulling   Extension: 50 degrees  "    Right side bendin degrees  Tightness/pulling   Left side bendin degrees  Tightness/pulling    Right rotation: 75 degrees  pulling   Left rotation: 73 degrees  Pulling          Shoulder flexion: left: 150 degrees; right: 160 degrees  Shoulder abduction: equal bilateral   Shoulder external rotation: left: C7; right: T1  Shoulder internal rotation: equal bilateral        Manual Muscle test/Strength:     Myotome Right Left Pain/Dysfunction with Movement   C4- Shoulder Elevation 5/5 5/5     C5- Shoulder Abduction 5/5 5/5     Shoulder flexion 5/5  5/5     Shoulder extension 5/5 5/5     Shoulder external rotation  5/5 5/5     Shoulder internal rotation  5/5 5/5     C6- Wrist Extension 5/5 5/5     Elbow flexion 5/5 5/5     Wrist flexion 5/5 5/5     C7- Elbow Extension 5/5 5/5     C8- Phalangeal Abduction 4/5 4/5     T1- 5th Finger Abduction 4/5 4/5        Ulnar Nerve tension: (-) bilateral    Median nerve Tension:(-) bilateral      5x sit<>stand: 12 seconds     Normal:   60-69: 11.4 seconds  70-79: 12.6 seconds  80-89: 12.7 seconds     30 seconds sit<>stand: 11 reps     Normal:   Age Male Female   60-64 17 15   65-69 16 15   70-74 15 14   75-79 14 13   80-84 13 12   85-89 11 11   90-94 9 9         Range of Motion:      Thoracolumbar Active range of motion Pain/Dysfunction with Movement   Flexion To toes   Mild lightheadedness    Extension normal     Right side bending To knee     Left side bending To knee     Right Rotation normal      Left Rotation normal         Manual Muscle test/Strength:    Right Left Pain/Dysfunction with Movement   Hip Flexion 5/5 4+/5     Hip Extension 4/5 4/5     Hip internal rotation 4/5 4/5     Hip external rotation 5/5 5/5     Hip Abduction 4-/5 4-/5        Timed Up and Go: 9 seconds     Normal:  Community dwelling adult: >13.5 seconds  Older stroke patients: >14 seconds  Older adults already attending falls clinic: >15 seconds  Frail Elderly: >32.6 seconds  LE amputees >19  "seconds  Parkinson's disease: >11.5 seconds  Hip OA: >10 seconds  Vestibular disorders: >11.1 seconds     (Reference: [email or web address]://[email or web address]/[email or web address]-measures/[email or web address]-[email or web address]-go#[email or web address]-adults-and-geriatric-care)      Modified Oswestry: 40.0/100 --> 22.0 --> 28.0/100 (higher score = greater disability)  NDI: 60.0/100 --> 32.0--> 36.0/100 (higher score = greater disability)     CMS Impairment/Limitation/Restriction for Focus On therapeutic Outcomes (FOTO) Lumbar Spine Survey     Therapist reviewed Focus On therapeutic Outcomes (FOTO) scores for Heather Scheuermann Hammond on 11/13/2024.   Focus On therapeutic Outcomes (FOTO) documents entered into EPIC - see Media section.     Intake Score: 46% --> 57% --> 53%       Neck Intake score: 36% --> 53% --> 50%             TREATMENT       Sana received the treatments listed below:      received therapeutic exercises to develop strength and range of motion for 00 minutes including:  [] Pectoral stretch over towel roll x 3 minutes   [] upper body ergometer 3' forward/3' reverse  [] levator scapular stretches 2 x 30"  [] upper trapezius stretches 2 x 30"  [] sternocleidomastoid stretches 2 x 30"  [] suprascapular nerve glide x 10     received the following manual therapy techniques: Soft tissue Mobilization were applied to the: neck/low back for 00 minutes, including:   [] soft tissue mobilization bilateral sternocleidomastoid, bilateral upper trapezius, bilateral levator scapulae, bilateral temporalis  [] bilateral 1st rib mobilizations  [] bilateral scapular mobilizations  [] Bilateral upper trapezius positional release  [] Bilateral C5-C7 posterior/anterior mobilizations    received the following dynamic functional therapeutic activities to improve functional performance for 30 minutes, including:  [] Review of home exercise program  [] Education regarding dry needling, review of consent form   [x] Re-assessment and Focus On therapeutic Outcomes (FOTO)     received the " following neuromuscular re-education activities to improve: Muscle inhibition, Balance, Coordination, Kinesthetic, Sense, Proprioception, and Posture for 14 minutes. The following activities were included:  [] cervical isometric with red band x 20 repetitions each: retraction, side bending  [] cervical nods, rotation against ball on wall x 20 repetitions   [x] lower trapezius lift offs 2 x 10 repetitions   [] serratus stars x 10 repetitions with red band     [] Chin tuck 20 x 3 second holds  [] Chin tuck + rotation x 10 each   [] Cervical retraction 20 x 3 second holds  [] Scapular retraction 5 second holds over towel roll x 2 minutes  [] Seated horizontal abduction with red band  x 20   [] Seated no money with red band x 20   [] Standing diagonals with red band x20 bilateral     [] Row with red theratube 20 repetitions    [] Shoulder extension with red theratube 20 repetitions      [x] Bridge with isometric abduction and green (heavy) loop 2x 10   [x] + sidelying hip abduction 2x 10 bilateral   [x] + sidelying hip abduction circles clockwise/counterclockwise x10 each bilateral   [] Lower trunk rotation x 3 minutes  [] Supine clamshells 3-ways x 20 each with yellow band   [] Standing hip abduction 2x 10 bilateral   [] Standing hip extension 2x 10 bilateral       [] Application of TDN: Pt educated on benefits and potential side effects of dry needling. Educated pt on benefits, precautions, side effects following TDN. Educated pt to use heat following treatment sessions if pt is experiencing pain or soreness. Pt verbalized good understanding of education.  Pt signed written consent to dry needling. Pt gave verbal consent for DN    Patient received dry needling to the below listed muscles using 30 mm needles.  [] Bilateral upper trapezius (fanning, not left in situ)  [] bilateral semispinalis capitus (removed on left due to pain)  [] bilateral splenius capitus   [] bilateral C3 parapsinals  [] Bilateral levator  scapulae  [] Right C5   [] Bilateral temporalis    Winding performed every 5 minutes during treatment.       PATIENT EDUCATION AND HOME EXERCISES     Home Exercises Provided and Patient Education Provided     Education provided:   No new home exercise program      Written Home Exercises Provided: Patient instructed to cont prior HOME EXERCISE PROGRAM. Exercises were reviewed and Sana was able to demonstrate them prior to the end of the session.  Sana demonstrated fair  understanding of the education provided. See Electronic Medical Record under Patient Instructions for exercises provided during therapy sessions    ASSESSMENT   Significant difficulty with sidelying hip exercises. Limited treatment due to late arrival and re-assessment performed at today's visit. Overall improved functional testing, strength and cervical rotation range of motion but patient continues to report tightness overall that is not affecting her performance of activities but more the quality of life as it is constant and significant. Plan to resume dry needling at next visit.      Sana Is progressing well towards her goals.   Patient prognosis is Good.     Patient will continue to benefit from skilled outpatient physical therapy to address the deficits listed in the problem list box on initial evaluation, provide patient/family education and to maximize patient's level of independence in the home and community environment.     Patient's spiritual, cultural and educational needs considered and Patient agreeable to plan of care and goals.     Anticipated barriers to physical therapy: concussion symptoms, multiple therapies; multiple body parts involved     Goals:      Short term goals: In 4 weeks,  Goal Status   Patient will be independent with home exercise program to promote improved therapy outcomes.  MET   2. Patient will perform palloff press with good control to demonstrate improved core strength  in progress   3. Patient will  improve bilateral lower extremity Manual Muscle test to 4/5 to demonstrate improved strength for functional tasks including dressing, bathing, grooming, walking, stairs and transitional movements.   in progress   4. Patient will improve shoulder flexion and cervical range of motion by 10 degrees to improve functional mobility including dressing, bathing, grooming, walking, stairs and transitional movements.   in progress   5. Patient will improve TUG test to 15 sec to improve walking speed for functional community ambulation MET   6. Patient will improve 5x sit<>stand to 15 to improve functional strength and promote mobility.   MET    7. Patient will require minimal verbal cueing from PT for proper scapular retraction in order to improve postural awareness. MET         Long term goals: In 8 weeks, Goal Status   8. Patient will be independent with progressed home exercise program to self manage symptoms.   in progress   9. Patient will improve bilateral upper extremity/lower extremity Manual Muscle test to 5/5 to improve strength for functional tasks including dressing, bathing, walking, stairs and transitional movements.   in progress   10. Patient will report walking for 30 minutes with <2/10 pain 5x/week to promote healthy lifestyle and regular physical exercise.   in progress   11. Patient will improve Lumbar Focus On therapeutic Outcomes (FOTO) from 46% to 68% to decrease perceived limitation with maintaining/changing body position.   in progress   12. Patient will improve Neck Focus On therapeutic Outcomes (FOTO) from 36% to 59% to decrease perceived limitation with maintaining/changing body position.   in progress   13. Patient will improve TUG test to 11 sec to improve walking speed for functional community ambulation MET   14. Patient will improve bilateral single leg stance to 15 seconds to decrease fall risk and improve ambulation.  in progress   15. Patient will improve 5x sit<>stand to 11 seconds to  improve functional strength and promote mobility.  In progress   16. Patient will improve 30 second sit<>stand to 15 reps to improve functional strength and promote mobility.  in progress   17. Patient will lift 10-15 lbs with <2/10 pain to move her plants and perform household chores.   in progress        PLAN     Continue with postural reeducation, neck range of motion, neck strengthening, and dry needling      Updated Certification Period: 10/9/2024 to 1/4/2025   Recommended Treatment Plan: 1 times per week for 12 weeks:  Electrical Stimulation , Manual Therapy, Moist Heat/ Ice, Neuromuscular Re-ed, Patient Education, Therapeutic Activities, Therapeutic Exercise, and dry needling    Christen Guadalupe, PT

## 2024-11-12 ENCOUNTER — CLINICAL SUPPORT (OUTPATIENT)
Dept: REHABILITATION | Facility: HOSPITAL | Age: 54
End: 2024-11-12
Payer: COMMERCIAL

## 2024-11-12 DIAGNOSIS — R42 DIZZINESS: ICD-10-CM

## 2024-11-12 DIAGNOSIS — H81.8X9 DEFICIT OF VESTIBULO-OCULAR REFLEX: Primary | ICD-10-CM

## 2024-11-12 DIAGNOSIS — R68.89 DECREASED FUNCTIONAL ACTIVITY TOLERANCE: ICD-10-CM

## 2024-11-12 DIAGNOSIS — R26.89 BALANCE PROBLEM: ICD-10-CM

## 2024-11-12 PROCEDURE — 97112 NEUROMUSCULAR REEDUCATION: CPT | Mod: PO

## 2024-11-12 PROCEDURE — 97530 THERAPEUTIC ACTIVITIES: CPT | Mod: PO

## 2024-11-12 NOTE — PROGRESS NOTES
[facility]   Physical Therapy Updated POC    Name: Heather Scheuermann Hammond  Clinic Number: [id number]    Therapy Diagnosis:   Encounter Diagnoses   Name Primary?    Deficit of vestibulo-ocular reflex Yes    Dizziness     Balance problem     Decreased functional activity tolerance        Physician: Loida Batres MD    Visit Date: 11/12/2024    Physician Orders: PT Eval and Treat;   Order comments: Referral for vestibular therapy.   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 She doesn't have any particular dizziness episodes to reports, but endorses that she takes things slowly.     She is feeling okay this morning but is a little tired. Denies any symptoms at the start of her session    Response to previous treatment: tolerated well  Functional change: ongoing    Pain: 0/10  Location: headache    OBJECTIVE     Plan of care updated on 10/22/24.     Modified Motion Sensitivity Test: (All conditions performed standing facing a non-busy environment)  Movement Intensity (0-10) Duration  <5s=0  5-10s=1  11-20s = 2  21-30s = 3  >30s = 4 Score   5x Horizontal head turns 0 0 0   2. 5x Vertical Head turns 0 0 0   3. 5x Right diagonal head turns (upper left down to right) 0 0 0   4. 5x Left diagonal head turns (upper right down to left) 0 0 0   5. 5x Trunk bends (bending knees reaching to floor) 0 0 0   6. 5x Right quarter body turns (Look over right shoulder with trunk  rotation, feet planted) 0 0 0   7. 5x Left quarter body turns (Look over left shoulder with trunk rotation, feet planted) 0 0 0   8. 1x 360 degree turn to the right  0 0 0   9. 1x 360 degree turn to the left 3 3 6   10. 5x VOR cancellation (follow thumbs horizontally with head/trunk rotation x45 degrees each way) 1 2 3   Total score   9     mMSQ = Total score x (# of positions) / 14.00 = 18/14 = 1.3    Interpretation:   Mild: 0 - 10  Moderate: 11 - 30  Severe: 31 - 100        Treatment     Sana received the treatments listed below:      Sana received therapeutic exercises to develop endurance for 00 minutes including:    NP      Sana participated in neuromuscular re-education activities to improve: Balance and motion tolerance for 8 minutes. The following activities were included:    VORx1: Seated  3 x 30s, horizontal - 90 bpm  3 x 30s, vertical - 90 bpm      Sana participated in dynamic functional therapeutic activities to improve functional performance for 25 minutes, including:    Includes time for motion tolerance testing. See above.     Hallway ambulation:  3 x 100 feet, Horizontal head turns seeking and verbalizing words on wall  1 x 100 feet, Vertical head turns  1 x 100 feet turning each direction, Ambulation with half turns to catch <> toss rubber ball with PT     Seated on [facility], bouncing:  2 x 6 pairs, Spot it trials - targets held horizontally by PT    1 x 6 pairs, Spot it trials - targets held vertically by PT      Sana participated in sensory integration (structured exposure to sensory input) for functional adaptation to environment demands for 3 minutes, including:    Chest press with 8.8# med ball    Patient Education and Home Exercises     Home Exercises Provided and Patient Education Provided     Education provided:   8/27: - Visual demonstration, verbal instruction, and written handout provided for interventions included as part of pt's home exercise program   - PT provided  education on monitoring her symptoms throughout the day to take appropriate rest breaks and improve her tolerance to functional activities    Written Home Exercises Provided: yes. Exercises were reviewed and Sana was able to demonstrate them prior to the end of the session.  Sana demonstrated good  understanding of the education provided. See EMR under Patient Instructions for exercises provided during therapy sessions    ASSESSMENT     Sana performed today's session well. Motion Sensitivity Quotient placed patient well within the Mild motion sensitivity range; she only reported dizziness with a full turn to the left and VOR cancellation. Due to potential hyper-vigilance to symptoms, today's session emphasized external feedback. No excessive symptoms were reported with today's interventions and patient appeared to enjoy the tasks. PT to continue per established plan of care, emphasizing external feedback and habituation training.      Sana Is progressing well towards her goals.   Pt prognosis is Good.     Pt will continue to benefit from skilled outpatient physical therapy to address the deficits listed in the problem list box on initial evaluation, provide pt/family education and to maximize pt's level of independence in the home and community environment.     Pt's spiritual, cultural and educational needs considered and pt agreeable to plan of care and goals.     Anticipated barriers to physical therapy: apprehension/increased fear of falling     Goals:   Short Term Goals: 4 weeks   Pt will receive an individualized home exercise program. Met 8/27/24  Pt will tolerate performing smooth pursuits at >/= 45 bpm for at least 30 seconds to show improved visual tracking. Met 10/22/24  Pt will tolerate performing saccades at >/= 80 bpm to for at least 30 seconds to show improved visual scanning ability. Met 10/22/24  Pt will tolerate performing VOR x1 at >/= 70 bpm for at least 30 seconds to show improved  gaze stabilization and motion tolerance. Met 10/22/24  Pt will improve convergence point to </= 36 cm for improved near point focus. Met 9/23/24  Pt will improve postural control with LA condition 6 score of at least 15 s with minimal sway for decreased fall risk with standing ADLs. Met 9/23/24  PT will perform Functional Gait Assessment and other tests of motion tolerance as indicated to assess pt tolerance to functional activities. Met 8/27/24  PT will assess CRT's as indicated per pt symptoms. Ongoing     Long Term Goals: 8 weeks   Pt will be independent with an individualized home exercise program. Met 10/22/24  Pt will tolerate performing smooth pursuits at >/= 60 bpm for at least 30 seconds to show improved visual tracking. Met 10/22/24  Pt will tolerate performing saccades at >/= 90 bpm to for at least 30 seconds to show improved visual scanning ability. Progressing  Pt will tolerate performing VOR x1 at >/= 80 bpm for at least 30 seconds to show improved gaze stabilization and motion tolerance. Met 10/22/24  Pt will improve convergence point to </= 25 cm for improved near point focus. Met 10/22/24  Pt will improve postural control with LA condition 2 score of at least 30 s with minimal sway for decreased fall risk with standing ADLs. Met 10/22/24  Pt will improve postural control with LA condition 4 score of at least 10 s with minimal sway for decreased fall risk with standing ADLs. Met 10/22/24  Pt will improve postural control with LA condition 6 score of at least 20 s with minimal sway for decreased fall risk with standing ADLs. Met 9/23/24  Patient will improve her FOTO score from 48%  to at least 60% for improved self perception of functional mobility.(score 0-100, high score indicates greater level of function) Ongoing  Pt will improve Functional Gait Assessment (FGA) score to at least 25/30 for increased independence with home and community ambulation. Met 10/22/24  Pt will improve postural  control with LA condition 6 score of at least 30 s with minimal sway for decreased fall risk with standing ADLs Met 10/22/24  New 10/22/24: Pt will tolerate performing VOR x1 at >/= 100 bpm for at least 30 seconds to show improved gaze stabilization and motion tolerance.  New 10/22/24: Pt will improve convergence point to </= 18 cm for improved near point focus.      PLAN     Try MMSQ at next visit    Continue to progress oculomotor, balance, and motion tolerance interventions as tolerated    Jessica Weeks, PT

## 2024-11-13 ENCOUNTER — CLINICAL SUPPORT (OUTPATIENT)
Dept: REHABILITATION | Facility: OTHER | Age: 54
End: 2024-11-13
Payer: COMMERCIAL

## 2024-11-13 DIAGNOSIS — Z74.09 IMPAIRED FUNCTIONAL MOBILITY, BALANCE, GAIT, AND ENDURANCE: ICD-10-CM

## 2024-11-13 DIAGNOSIS — R29.3 POSTURE ABNORMALITY: ICD-10-CM

## 2024-11-13 DIAGNOSIS — M54.2 ACUTE NECK PAIN: Primary | ICD-10-CM

## 2024-11-13 DIAGNOSIS — M54.50 ACUTE BILATERAL LOW BACK PAIN WITHOUT SCIATICA: ICD-10-CM

## 2024-11-13 PROCEDURE — 97112 NEUROMUSCULAR REEDUCATION: CPT | Mod: PN

## 2024-11-13 PROCEDURE — 97530 THERAPEUTIC ACTIVITIES: CPT | Mod: PN

## 2024-11-20 ENCOUNTER — TELEPHONE (OUTPATIENT)
Dept: SPINE | Facility: CLINIC | Age: 54
End: 2024-11-20
Payer: COMMERCIAL

## 2024-11-20 ENCOUNTER — CLINICAL SUPPORT (OUTPATIENT)
Dept: REHABILITATION | Facility: OTHER | Age: 54
End: 2024-11-20
Payer: COMMERCIAL

## 2024-11-20 DIAGNOSIS — M54.50 ACUTE BILATERAL LOW BACK PAIN WITHOUT SCIATICA: ICD-10-CM

## 2024-11-20 DIAGNOSIS — R29.3 POSTURE ABNORMALITY: ICD-10-CM

## 2024-11-20 DIAGNOSIS — Z74.09 IMPAIRED FUNCTIONAL MOBILITY, BALANCE, GAIT, AND ENDURANCE: ICD-10-CM

## 2024-11-20 DIAGNOSIS — M54.2 ACUTE NECK PAIN: Primary | ICD-10-CM

## 2024-11-20 PROCEDURE — 97110 THERAPEUTIC EXERCISES: CPT | Mod: PN

## 2024-11-20 PROCEDURE — 97112 NEUROMUSCULAR REEDUCATION: CPT | Mod: PN

## 2024-11-21 ENCOUNTER — CLINICAL SUPPORT (OUTPATIENT)
Dept: REHABILITATION | Facility: HOSPITAL | Age: 54
End: 2024-11-21
Payer: COMMERCIAL

## 2024-11-21 DIAGNOSIS — R68.89 DECREASED FUNCTIONAL ACTIVITY TOLERANCE: ICD-10-CM

## 2024-11-21 DIAGNOSIS — R42 DIZZINESS: ICD-10-CM

## 2024-11-21 DIAGNOSIS — H81.8X9 DEFICIT OF VESTIBULO-OCULAR REFLEX: Primary | ICD-10-CM

## 2024-11-21 DIAGNOSIS — R26.89 BALANCE PROBLEM: ICD-10-CM

## 2024-11-21 PROCEDURE — 97110 THERAPEUTIC EXERCISES: CPT | Mod: PO

## 2024-11-21 PROCEDURE — 97530 THERAPEUTIC ACTIVITIES: CPT | Mod: PO

## 2024-11-21 PROCEDURE — 97112 NEUROMUSCULAR REEDUCATION: CPT | Mod: PO
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
## 2024-11-22 ENCOUNTER — OFFICE VISIT (OUTPATIENT)
Dept: SPINE | Facility: CLINIC | Age: 54
End: 2024-11-22
Attending: PHYSICAL MEDICINE & REHABILITATION
Payer: COMMERCIAL

## 2024-11-22 ENCOUNTER — TELEPHONE (OUTPATIENT)
Dept: NEUROLOGY | Facility: CLINIC | Age: 54
End: 2024-11-22
Payer: COMMERCIAL

## 2024-11-22 VITALS
RESPIRATION RATE: 18 BRPM | SYSTOLIC BLOOD PRESSURE: 142 MMHG | DIASTOLIC BLOOD PRESSURE: 63 MMHG | BODY MASS INDEX: 28.17 KG/M2 | OXYGEN SATURATION: 100 % | WEIGHT: 185.88 LBS | HEART RATE: 81 BPM | HEIGHT: 68 IN

## 2024-11-22 DIAGNOSIS — G89.29 CHRONIC MIDLINE LOW BACK PAIN WITHOUT SCIATICA: ICD-10-CM

## 2024-11-22 DIAGNOSIS — M62.838 MUSCLE SPASM: ICD-10-CM

## 2024-11-22 DIAGNOSIS — M54.50 CHRONIC MIDLINE LOW BACK PAIN WITHOUT SCIATICA: ICD-10-CM

## 2024-11-22 DIAGNOSIS — M54.2 NECK PAIN: Primary | ICD-10-CM

## 2024-11-22 PROCEDURE — 99999 PR PBB SHADOW E&M-EST. PATIENT-LVL IV: CPT | Mod: PBBFAC,,, | Performed by: PHYSICAL MEDICINE & REHABILITATION
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
## 2024-11-23 DIAGNOSIS — M54.2 NECK PAIN: Primary | ICD-10-CM

## 2024-11-25 RX ORDER — DICLOFENAC SODIUM 75 MG/1
75 TABLET, DELAYED RELEASE ORAL 2 TIMES DAILY
Qty: 60 TABLET | Refills: 2 | Status: SHIPPED | OUTPATIENT
Start: 2024-11-25

## 2024-12-02 NOTE — PROGRESS NOTES
Please see Plan of Care for updated PT recommendations.    [facility]   Physical Therapy Updated POC    Name: Heather Scheuermann Hammond  Clinic Number: [id number]    Therapy Diagnosis:   Encounter Diagnoses   Name Primary?    Deficit of vestibulo-ocular reflex Yes    Dizziness     Balance problem     Decreased functional activity tolerance      Physician: Loida Batres MD    Visit Date: 12/3/2024    Physician Orders: PT Eval and Treat;   Order comments: Referral for vestibular therapy.   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]   Imbalance [R26.89]  Evaluation Date: 8/21/2024  Authorization Period Expiration: 12/31/24  Plan of Care Expiration: 12/27/2024  Updated Plan of Care Expiration: 1/31/2024  Visit # / Visits authorized: 26/32 (16th vestibular PT visit)    Progress Note Due: 11/22/24    PTA Visit #: 0/5     Time In: 0818  Time Out: 0849  Total Billable Time: 41 minutes    SUBJECTIVE     Pt reports: She feels that her dizziness has been better recently in her daily life and she's been able to tolerate her daily activities with less symptoms. States that she has been dealing with several life issues surrounding her  and family which has impacted her attendance recently.    Response to previous treatment: tolerated well  Functional change: ongoing    Pain: 0/10  Location: headache    OBJECTIVE     Objective measures updated on 12/3/24. See below for details    Saccades: Impaired: able to perform at 100 bpm but with difficulty maintaining speed and increased visual fatigue noted at end of trial   Convergence: NT today   VOR 1: Impaired: able to perform at 110 bpm with mild motion tolerance reported; minimal cervical ROM noted due to apprehension re: dizziness; attempted to perform at 120 bpm but unable to maintain pace due to discomfort        [place] SENSORY  "ORGANIZATION PERFORMANCE (SOP) TEST: 10/22/24  (N=normal, S = sway, F= Fall; hold each position for 30")  Condition 1: (firm surface/feet together/eyes open) P  Condition 2: (firm surface/feet together/eyes closed) P  Condition 3: (firm surface/feet in tandem/eyes open) P bilaterally - Mod sway with left leading  Condition 4: (firm surface/feet in tandem/eyes closed) F - 16 seconds with right leading  Condition 5: (soft surface/feet together/eyes open) P  Condition 6: (soft surface/feet together/eyes closed) P - min sway     [place] SENSORY ORGANIZATION PERFORMANCE (SOP) TEST: 12/3/24  (N=normal, S = sway, F= Fall; hold each position for 30")  Condition 1: (firm surface/feet together/eyes open) P  Condition 2: (firm surface/feet together/eyes closed) P  Condition 3: (firm surface/feet in tandem/eyes open) P bilaterally  Condition 4: (firm surface/feet in tandem/eyes closed) F - 21 seconds  Condition 5: (soft surface/feet together/eyes open) P   Condition 6: (soft surface/feet together/eyes closed) P        Functional Gait Assessment:   1. Gait on level surface =  3 (5.35 s)   (3) Normal: less than 5.5 sec, no A.D., no imbalance, normal gait pattern, deviates< 6in   (2) Mild impairment: 7-5.6 sec, uses A.D., mild gait deviations, or deviates 6-10 in   (1) Moderate impairment: > 7 sec, slow speed, imbalance, deviates 10-15 in.   (0) Severe impairment: needs assist, deviates >15 in, reach/touch wall  2. Change in Gait Speed = 3   (3) Normal: smooth change w/o loss of balance or gait deviation, deviates < 6 in, significant difference between speeds   (2) Mild impairment: changes speed, but demonstrates mild gait deviations, deviates 6-10 in, OR no deviations but unable to significantly speed, OR uses A.D.   (1) Moderate impairment: minor changes to speed, OR changes speed w/ significant deviations, deviates 10-15 in, OR  Changes speed , but loses balance & recovers   (0) Severe impairment: cannot change speed, deviates >15 " in, or loses balance & needs assist  3. Gait with horizontal head turns  = 3   (3) Normal: no change in gait, deviates <6 in   (2) Mild impairment: slight change in speed, deviates 6-10 in, OR uses A.D.   (1) Moderate impairment: moderate change in speed, deviates 10-15 in   (0) Severe impairment: severe disruption of gait, deviates >15in  4. Gait with vertical head turns = 3   (3) Normal: no change in gait, deviates <6 in   (2) Mild impairment: slight change in speed, deviates 6-10 in OR uses A.D.   (1) Moderate impairment: moderate change in speed, deviates 10-15 in   (0) Severe impairment: severe disruption of gait, deviates >15 in  5. Gait with pivot turns = 3   (3) Normal: performs safely in 3 sec, no LOB   (2) Mild impairment: performs in >3 sec & no LOB, OR turns safely & requires several steps to regain LOB   (1) Moderate impairment: turns slow, OR requires several small steps for balance following turn & stop   (0) Severe impairment: cannot turn safely, needs assist  6. Step over obstacle = 3   (3) Normal: steps over 2 stacked boxes w/o change in speed or LOB   (2) Mild impairment: able to step over 1 box w/o change in speed or LOB   (1) Moderate impairment: steps over 1 box but must slow down, may require VC   (0) Severe impairment: cannot perform w/o assist  7. Gait with Narrow ANITA = 3   (3) Normal: 10 steps no staggering   (2) Mild impairment: 7-9 steps   (1) Moderate impairment: 4-7 steps   (0) Severe impairment: < 4 steps or cannot perform w/o assist  8. Gait with eyes closed = 1   (3) Normal: < 7 sec, no A.D., no LOB, normal gait pattern, deviates <6 in   (2) Mild impairment: 7.1-9 sec, mild gait deviations, deviates 6-10 in   (1) Moderate impairment: > 9 sec, abnormal pattern, LOB, deviates 10-15 in   (0) Severe impairment: cannot perform w/o assist, LOB, deviates >15in  9. Ambulating Backwards = 2   (3) Normal: no A.D., no LOB, normal gait pattern, deviates <6in   (2) Mild impairment: uses A.D.,  slower speed, mild gait deviations, deviates 6-10 in   (1) Moderate impairment: slow speed, abnormal gait pattern, LOB, deviates 10-15 in   (0) Severe impairment: severe gait deviations or LOB, deviates >15in  10. Steps = 2   (3) Normal: alternating feet, no rail   (2) Mild Impairment: alternating feet, uses rail   (1) Moderate impairment: step-to, uses rail   (0) Severe impairment: cannot perform safely    12/3/24: Score 26/30   10/22/24: Score 25/30 9/23/24: Score 24/30      Score:   <22/30 fall risk   <20/30 fall risk in older adults   <18/30 fall risk in Parkinsons        FOTO Survey: 50%    Treatment     Sana received the treatments listed below:      Sana received therapeutic exercises to develop endurance for 00 minutes including:    NP    Sana participated in neuromuscular re-education activities to improve: Balance and motion tolerance for 00 minutes. The following activities were included:    NP      Sana participated in dynamic functional therapeutic activities to improve functional performance for 41 minutes, including:    Objective testing listed above  FOTO Survey      Sana participated in sensory integration (structured exposure to sensory input) for functional adaptation to environment demands for 00 minutes, including:    NP      Patient Education and Home Exercises     Home Exercises Provided and Patient Education Provided     Education provided:   8/27: - Visual demonstration, verbal instruction, and written handout provided for interventions included as part of pt's home exercise program   - PT provided education on monitoring her symptoms throughout the day to take appropriate rest breaks and improve her tolerance to functional activities    Written Home Exercises Provided: yes. Exercises were reviewed and Sana was able to demonstrate them prior to the end of the session.  Sana demonstrated good  understanding of the education provided. See EMR under Patient Instructions  for exercises provided during therapy sessions    ASSESSMENT     Heather Scheuermann Hammond tolerated today's session well with a focus on reassessment of progress towards goals. She was able to tolerate performing the outcome measures listed above with improvements noted in her saccades, VOR x1, static balance reactions and dynamic postural control. To date, she has met 7/8 short and 11/14 long-term goals established at her initial evaluation and prior progress notes. She remains appropriate for skilled physical therapy services at 1 visit per week for an additional 4 weeks beyond her established plan of care to continue addressing her functional deficits and goals related to improved tolerance to her daily tasks and life duties.     Sana Is progressing well towards her goals.   Pt prognosis is Good.     Pt will continue to benefit from skilled outpatient physical therapy to address the deficits listed in the problem list box on initial evaluation, provide pt/family education and to maximize pt's level of independence in the home and community environment.     Pt's spiritual, cultural and educational needs considered and pt agreeable to plan of care and goals.     Anticipated barriers to physical therapy: apprehension/increased fear of falling     Goals:   Short Term Goals: 4 weeks   Pt will receive an individualized home exercise program. Met 8/27/24  Pt will tolerate performing smooth pursuits at >/= 45 bpm for at least 30 seconds to show improved visual tracking. Met 10/22/24  Pt will tolerate performing saccades at >/= 80 bpm to for at least 30 seconds to show improved visual scanning ability. Met 10/22/24  Pt will tolerate performing VOR x1 at >/= 70 bpm for at least 30 seconds to show improved gaze stabilization and motion tolerance. Met 10/22/24  Pt will improve convergence point to </= 36 cm for improved near point focus. Met 9/23/24  Pt will improve postural control with LA condition 6 score of at  least 15 s with minimal sway for decreased fall risk with standing ADLs. Met 9/23/24  PT will perform Functional Gait Assessment and other tests of motion tolerance as indicated to assess pt tolerance to functional activities. Met 8/27/24  PT will assess CRT's as indicated per pt symptoms. Ongoing     Long Term Goals: 8 weeks   Pt will be independent with an individualized home exercise program. Met 10/22/24  Pt will tolerate performing smooth pursuits at >/= 60 bpm for at least 30 seconds to show improved visual tracking. Met 10/22/24  Pt will tolerate performing saccades at >/= 90 bpm to for at least 30 seconds to show improved visual scanning ability. Met 12/3/24  Pt will tolerate performing VOR x1 at >/= 80 bpm for at least 30 seconds to show improved gaze stabilization and motion tolerance. Met 10/22/24  Pt will improve convergence point to </= 25 cm for improved near point focus. Met 10/22/24  Pt will improve postural control with LA condition 2 score of at least 30 s with minimal sway for decreased fall risk with standing ADLs. Met 10/22/24  Pt will improve postural control with LA condition 4 score of at least 10 s with minimal sway for decreased fall risk with standing ADLs. Met 10/22/24  Pt will improve postural control with LA condition 6 score of at least 20 s with minimal sway for decreased fall risk with standing ADLs. Met 9/23/24  Patient will improve her FOTO score from 48%  to at least 60% for improved self perception of functional mobility.(score 0-100, high score indicates greater level of function) Ongoing  Pt will improve Functional Gait Assessment (FGA) score to at least 25/30 for increased independence with home and community ambulation. Met 10/22/24  Pt will improve postural control with LA condition 6 score of at least 30 s with minimal sway for decreased fall risk with standing ADLs Met 10/22/24  Pt will tolerate performing VOR x1 at >/= 100 bpm for at least 30 seconds to show  improved gaze stabilization and motion tolerance. Met 12/3/24  Pt will improve convergence point to </= 18 cm for improved near point focus. Ongoing  New 12/3/24: Pt will tolerate performing VOR x1 at >/= 120 bpm for at least 30 seconds to show improved gaze stabilization and motion tolerance.      PLAN     Extend PT POC from 12/27/24 to 1/31/24    Continue to progress oculomotor, balance, and motion tolerance interventions as tolerated    Ruth Rodgers, PT, DPT, NCS

## 2024-12-03 ENCOUNTER — CLINICAL SUPPORT (OUTPATIENT)
Dept: REHABILITATION | Facility: HOSPITAL | Age: 54
End: 2024-12-03
Payer: COMMERCIAL

## 2024-12-03 DIAGNOSIS — R68.89 DECREASED FUNCTIONAL ACTIVITY TOLERANCE: ICD-10-CM

## 2024-12-03 DIAGNOSIS — H81.8X9 DEFICIT OF VESTIBULO-OCULAR REFLEX: Primary | ICD-10-CM

## 2024-12-03 DIAGNOSIS — R26.89 BALANCE PROBLEM: ICD-10-CM

## 2024-12-03 DIAGNOSIS — R42 DIZZINESS: ICD-10-CM

## 2024-12-03 PROCEDURE — 97530 THERAPEUTIC ACTIVITIES: CPT | Mod: PO

## 2024-12-03 NOTE — PLAN OF CARE
[facility]   Physical Therapy Updated POC    Name: Heather Scheuermann Hammond  Clinic Number: [id number]    Therapy Diagnosis:   Encounter Diagnoses   Name Primary?    Deficit of vestibulo-ocular reflex Yes    Dizziness     Balance problem     Decreased functional activity tolerance      Physician: Loida Batres MD    Visit Date: 12/3/2024    Physician Orders: PT Eval and Treat;   Order comments: Referral for vestibular therapy.   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]   Imbalance [R26.89]  Evaluation Date: 8/21/2024  Authorization Period Expiration: 12/31/24  Plan of Care Expiration: 12/27/2024  Updated Plan of Care Expiration: 1/31/2024  Visit # / Visits authorized: 26/32 (16th vestibular PT visit)    Progress Note Due: 11/22/24    PTA Visit #: 0/5     Time In: 0818  Time Out: 0849  Total Billable Time: 41 minutes    SUBJECTIVE     Pt reports: She feels that her dizziness has been better recently in her daily life and she's been able to tolerate her daily activities with less symptoms. States that she has been dealing with several life issues surrounding her  and family which has impacted her attendance recently.    Response to previous treatment: tolerated well  Functional change: ongoing    Pain: 0/10  Location: headache    OBJECTIVE     Objective measures updated on 12/3/24. See below for details    Saccades: Impaired: able to perform at 100 bpm but with difficulty maintaining speed and increased visual fatigue noted at end of trial   Convergence: NT today   VOR 1: Impaired: able to perform at 110 bpm with mild motion tolerance reported; minimal cervical ROM noted due to apprehension re: dizziness; attempted to perform at 120 bpm but unable to maintain pace due to discomfort        [place] SENSORY ORGANIZATION PERFORMANCE (SOP) TEST: 10/22/24  (N=normal, S =  Last ov 11/11/2020    Last refill 11/10/2020 "sway, F= Fall; hold each position for 30")  Condition 1: (firm surface/feet together/eyes open) P  Condition 2: (firm surface/feet together/eyes closed) P  Condition 3: (firm surface/feet in tandem/eyes open) P bilaterally - Mod sway with left leading  Condition 4: (firm surface/feet in tandem/eyes closed) F - 16 seconds with right leading  Condition 5: (soft surface/feet together/eyes open) P  Condition 6: (soft surface/feet together/eyes closed) P - min sway     [place] SENSORY ORGANIZATION PERFORMANCE (SOP) TEST: 12/3/24  (N=normal, S = sway, F= Fall; hold each position for 30")  Condition 1: (firm surface/feet together/eyes open) P  Condition 2: (firm surface/feet together/eyes closed) P  Condition 3: (firm surface/feet in tandem/eyes open) P bilaterally  Condition 4: (firm surface/feet in tandem/eyes closed) F - 21 seconds  Condition 5: (soft surface/feet together/eyes open) P   Condition 6: (soft surface/feet together/eyes closed) P        Functional Gait Assessment:   1. Gait on level surface =  3 (5.35 s)   (3) Normal: less than 5.5 sec, no A.D., no imbalance, normal gait pattern, deviates< 6in   (2) Mild impairment: 7-5.6 sec, uses A.D., mild gait deviations, or deviates 6-10 in   (1) Moderate impairment: > 7 sec, slow speed, imbalance, deviates 10-15 in.   (0) Severe impairment: needs assist, deviates >15 in, reach/touch wall  2. Change in Gait Speed = 3   (3) Normal: smooth change w/o loss of balance or gait deviation, deviates < 6 in, significant difference between speeds   (2) Mild impairment: changes speed, but demonstrates mild gait deviations, deviates 6-10 in, OR no deviations but unable to significantly speed, OR uses A.D.   (1) Moderate impairment: minor changes to speed, OR changes speed w/ significant deviations, deviates 10-15 in, OR  Changes speed , but loses balance & recovers   (0) Severe impairment: cannot change speed, deviates >15 in, or loses balance & needs assist  3. Gait with horizontal " head turns  = 3   (3) Normal: no change in gait, deviates <6 in   (2) Mild impairment: slight change in speed, deviates 6-10 in, OR uses A.D.   (1) Moderate impairment: moderate change in speed, deviates 10-15 in   (0) Severe impairment: severe disruption of gait, deviates >15in  4. Gait with vertical head turns = 3   (3) Normal: no change in gait, deviates <6 in   (2) Mild impairment: slight change in speed, deviates 6-10 in OR uses A.D.   (1) Moderate impairment: moderate change in speed, deviates 10-15 in   (0) Severe impairment: severe disruption of gait, deviates >15 in  5. Gait with pivot turns = 3   (3) Normal: performs safely in 3 sec, no LOB   (2) Mild impairment: performs in >3 sec & no LOB, OR turns safely & requires several steps to regain LOB   (1) Moderate impairment: turns slow, OR requires several small steps for balance following turn & stop   (0) Severe impairment: cannot turn safely, needs assist  6. Step over obstacle = 3   (3) Normal: steps over 2 stacked boxes w/o change in speed or LOB   (2) Mild impairment: able to step over 1 box w/o change in speed or LOB   (1) Moderate impairment: steps over 1 box but must slow down, may require VC   (0) Severe impairment: cannot perform w/o assist  7. Gait with Narrow ANITA = 3   (3) Normal: 10 steps no staggering   (2) Mild impairment: 7-9 steps   (1) Moderate impairment: 4-7 steps   (0) Severe impairment: < 4 steps or cannot perform w/o assist  8. Gait with eyes closed = 1   (3) Normal: < 7 sec, no A.D., no LOB, normal gait pattern, deviates <6 in   (2) Mild impairment: 7.1-9 sec, mild gait deviations, deviates 6-10 in   (1) Moderate impairment: > 9 sec, abnormal pattern, LOB, deviates 10-15 in   (0) Severe impairment: cannot perform w/o assist, LOB, deviates >15in  9. Ambulating Backwards = 2   (3) Normal: no A.D., no LOB, normal gait pattern, deviates <6in   (2) Mild impairment: uses A.D., slower speed, mild gait deviations, deviates 6-10 in   (1)  Moderate impairment: slow speed, abnormal gait pattern, LOB, deviates 10-15 in   (0) Severe impairment: severe gait deviations or LOB, deviates >15in  10. Steps = 2   (3) Normal: alternating feet, no rail   (2) Mild Impairment: alternating feet, uses rail   (1) Moderate impairment: step-to, uses rail   (0) Severe impairment: cannot perform safely    12/3/24: Score 26/30   10/22/24: Score 25/30 9/23/24: Score 24/30      Score:   <22/30 fall risk   <20/30 fall risk in older adults   <18/30 fall risk in Parkinsons        FOTO Survey: 50%    Treatment     Sana received the treatments listed below:      Sana received therapeutic exercises to develop endurance for 00 minutes including:    NP    Sana participated in neuromuscular re-education activities to improve: Balance and motion tolerance for 00 minutes. The following activities were included:    NP      Sana participated in dynamic functional therapeutic activities to improve functional performance for 41 minutes, including:    Objective testing listed above  FOTO Survey      Sana participated in sensory integration (structured exposure to sensory input) for functional adaptation to environment demands for 00 minutes, including:    NP      Patient Education and Home Exercises     Home Exercises Provided and Patient Education Provided     Education provided:   8/27: - Visual demonstration, verbal instruction, and written handout provided for interventions included as part of pt's home exercise program   - PT provided education on monitoring her symptoms throughout the day to take appropriate rest breaks and improve her tolerance to functional activities    Written Home Exercises Provided: yes. Exercises were reviewed and Sana was able to demonstrate them prior to the end of the session.  Sana demonstrated good  understanding of the education provided. See EMR under Patient Instructions for exercises provided during therapy  sessions    ASSESSMENT     Heather Scheuermann Hammond tolerated today's session well with a focus on reassessment of progress towards goals. She was able to tolerate performing the outcome measures listed above with improvements noted in her saccades, VOR x1, static balance reactions and dynamic postural control. To date, she has met 7/8 short and 11/14 long-term goals established at her initial evaluation and prior progress notes. She remains appropriate for skilled physical therapy services at 1 visit per week for an additional 4 weeks beyond her established plan of care to continue addressing her functional deficits and goals related to improved tolerance to her daily tasks and life duties.     Sana Is progressing well towards her goals.   Pt prognosis is Good.     Pt will continue to benefit from skilled outpatient physical therapy to address the deficits listed in the problem list box on initial evaluation, provide pt/family education and to maximize pt's level of independence in the home and community environment.     Pt's spiritual, cultural and educational needs considered and pt agreeable to plan of care and goals.     Anticipated barriers to physical therapy: apprehension/increased fear of falling     Goals:   Short Term Goals: 4 weeks   Pt will receive an individualized home exercise program. Met 8/27/24  Pt will tolerate performing smooth pursuits at >/= 45 bpm for at least 30 seconds to show improved visual tracking. Met 10/22/24  Pt will tolerate performing saccades at >/= 80 bpm to for at least 30 seconds to show improved visual scanning ability. Met 10/22/24  Pt will tolerate performing VOR x1 at >/= 70 bpm for at least 30 seconds to show improved gaze stabilization and motion tolerance. Met 10/22/24  Pt will improve convergence point to </= 36 cm for improved near point focus. Met 9/23/24  Pt will improve postural control with LA condition 6 score of at least 15 s with minimal sway for  decreased fall risk with standing ADLs. Met 9/23/24  PT will perform Functional Gait Assessment and other tests of motion tolerance as indicated to assess pt tolerance to functional activities. Met 8/27/24  PT will assess CRT's as indicated per pt symptoms. Ongoing     Long Term Goals: 8 weeks   Pt will be independent with an individualized home exercise program. Met 10/22/24  Pt will tolerate performing smooth pursuits at >/= 60 bpm for at least 30 seconds to show improved visual tracking. Met 10/22/24  Pt will tolerate performing saccades at >/= 90 bpm to for at least 30 seconds to show improved visual scanning ability. Met 12/3/24  Pt will tolerate performing VOR x1 at >/= 80 bpm for at least 30 seconds to show improved gaze stabilization and motion tolerance. Met 10/22/24  Pt will improve convergence point to </= 25 cm for improved near point focus. Met 10/22/24  Pt will improve postural control with LA condition 2 score of at least 30 s with minimal sway for decreased fall risk with standing ADLs. Met 10/22/24  Pt will improve postural control with LA condition 4 score of at least 10 s with minimal sway for decreased fall risk with standing ADLs. Met 10/22/24  Pt will improve postural control with LA condition 6 score of at least 20 s with minimal sway for decreased fall risk with standing ADLs. Met 9/23/24  Patient will improve her FOTO score from 48%  to at least 60% for improved self perception of functional mobility.(score 0-100, high score indicates greater level of function) Ongoing  Pt will improve Functional Gait Assessment (FGA) score to at least 25/30 for increased independence with home and community ambulation. Met 10/22/24  Pt will improve postural control with LA condition 6 score of at least 30 s with minimal sway for decreased fall risk with standing ADLs Met 10/22/24  Pt will tolerate performing VOR x1 at >/= 100 bpm for at least 30 seconds to show improved gaze stabilization and motion  tolerance. Met 12/3/24  Pt will improve convergence point to </= 18 cm for improved near point focus. Ongoing  New 12/3/24: Pt will tolerate performing VOR x1 at >/= 120 bpm for at least 30 seconds to show improved gaze stabilization and motion tolerance.      PLAN     Extend PT POC from 12/27/24 to 1/31/24    Continue to progress oculomotor, balance, and motion tolerance interventions as tolerated    Ruth Rodgers, PT, DPT, NCS

## 2024-12-09 NOTE — PROGRESS NOTES
"  [facility]   Physical Therapy Updated POC    Name: Heather Scheuermann Hammond  Clinic Number: [id number]    Therapy Diagnosis:   Encounter Diagnoses   Name Primary?    Deficit of vestibulo-ocular reflex Yes    Dizziness     Balance problem     Decreased functional activity tolerance        Physician: Loida Batres MD    Visit Date: 12/10/2024    Physician Orders: PT Eval and Treat;   Order comments: Referral for vestibular therapy.   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]   Imbalance [R26.89]  Evaluation Date: 8/21/2024  Authorization Period Expiration: 12/31/24  Plan of Care Expiration: 12/27/2024  Updated Plan of Care Expiration: 1/31/2024  Visit # / Visits authorized: 26/32 (16th vestibular PT visit)    Progress Note Due: 1/3/25    PTA Visit #: 0/5     Time In: 0812  Time Out: 0843  Total Billable Time: 31 minutes    SUBJECTIVE     Pt reports: she is feeling okay this morning but has some neck pain that's bothering her    Response to previous treatment: tolerated well  Functional change: ongoing    Pain: 6-7/10  Location: neck pain    OBJECTIVE     Objective measures updated on 12/3/24.    Treatment     Sana received the treatments listed below:      Sana received therapeutic exercises to develop endurance for 00 minutes including:    NP       Sana participated in neuromuscular re-education activities to improve: Balance and motion tolerance for 14 minutes. The following activities were included:     VORx1: Seated   3 x 30s, horizontal - 110 bpm  3 x 30s, vertical - 110 bpm     Saccades:  2 x 45s, "Spot It" cards, horizontal orientation  2 x 45s, "Spot It" cards, vertical orientation     Includes time for therapeutic rest breaks as needed       Sana participated in dynamic functional therapeutic activities to improve functional performance for 17 minutes, " including:     X 10 min, Treadmill ambulation - 2.0 mph - Gait Better, memory - 2 items - 70% accuracy    Open gym:  1 x 12 reps each direction, Bean bag toss, from mat to chair ~5 feet behind pt via 180 degree turns    Total activity time includes rest breaks as needed between activities      Sana participated in sensory integration (structured exposure to sensory input) for functional adaptation to environment demands for 00 minutes, including:    NP      Patient Education and Home Exercises     Home Exercises Provided and Patient Education Provided     Education provided:   8/27: - Visual demonstration, verbal instruction, and written handout provided for interventions included as part of pt's home exercise program   - PT provided education on monitoring her symptoms throughout the day to take appropriate rest breaks and improve her tolerance to functional activities    Written Home Exercises Provided: yes. Exercises were reviewed and Sana was able to demonstrate them prior to the end of the session.  Sana demonstrated good  understanding of the education provided. See EMR under Patient Instructions for exercises provided during therapy sessions    ASSESSMENT     Heather Scheuermann Hammond tolerated today's session well but was limited on time due to late arrival. She showed improved tolerance to oculomotor interventions on today but continues to require VC to increase her ROM. She also performed well on the GaitBetter with no reported difficulty noted with visual motion tolerance. She continues to be appropriate for skilled physical therapy interventions to improve her tolerance to daily activities.     Sana Is progressing well towards her goals.   Pt prognosis is Good.     Pt will continue to benefit from skilled outpatient physical therapy to address the deficits listed in the problem list box on initial evaluation, provide pt/family education and to maximize pt's level of independence in the home  and community environment.     Pt's spiritual, cultural and educational needs considered and pt agreeable to plan of care and goals.     Anticipated barriers to physical therapy: apprehension/increased fear of falling     Goals:   Short Term Goals: 4 weeks   Pt will receive an individualized home exercise program. Met 8/27/24  Pt will tolerate performing smooth pursuits at >/= 45 bpm for at least 30 seconds to show improved visual tracking. Met 10/22/24  Pt will tolerate performing saccades at >/= 80 bpm to for at least 30 seconds to show improved visual scanning ability. Met 10/22/24  Pt will tolerate performing VOR x1 at >/= 70 bpm for at least 30 seconds to show improved gaze stabilization and motion tolerance. Met 10/22/24  Pt will improve convergence point to </= 36 cm for improved near point focus. Met 9/23/24  Pt will improve postural control with LA condition 6 score of at least 15 s with minimal sway for decreased fall risk with standing ADLs. Met 9/23/24  PT will perform Functional Gait Assessment and other tests of motion tolerance as indicated to assess pt tolerance to functional activities. Met 8/27/24  PT will assess CRT's as indicated per pt symptoms. Ongoing     Long Term Goals: 8 weeks   Pt will be independent with an individualized home exercise program. Met 10/22/24  Pt will tolerate performing smooth pursuits at >/= 60 bpm for at least 30 seconds to show improved visual tracking. Met 10/22/24  Pt will tolerate performing saccades at >/= 90 bpm to for at least 30 seconds to show improved visual scanning ability. Met 12/3/24  Pt will tolerate performing VOR x1 at >/= 80 bpm for at least 30 seconds to show improved gaze stabilization and motion tolerance. Met 10/22/24  Pt will improve convergence point to </= 25 cm for improved near point focus. Met 10/22/24  Pt will improve postural control with LA condition 2 score of at least 30 s with minimal sway for decreased fall risk with standing ADLs.  Met 10/22/24  Pt will improve postural control with LA condition 4 score of at least 10 s with minimal sway for decreased fall risk with standing ADLs. Met 10/22/24  Pt will improve postural control with LA condition 6 score of at least 20 s with minimal sway for decreased fall risk with standing ADLs. Met 9/23/24  Patient will improve her FOTO score from 48%  to at least 60% for improved self perception of functional mobility.(score 0-100, high score indicates greater level of function) Ongoing  Pt will improve Functional Gait Assessment (FGA) score to at least 25/30 for increased independence with home and community ambulation. Met 10/22/24  Pt will improve postural control with LA condition 6 score of at least 30 s with minimal sway for decreased fall risk with standing ADLs Met 10/22/24  Pt will tolerate performing VOR x1 at >/= 100 bpm for at least 30 seconds to show improved gaze stabilization and motion tolerance. Met 12/3/24  Pt will improve convergence point to </= 18 cm for improved near point focus. Ongoing  New 12/3/24: Pt will tolerate performing VOR x1 at >/= 120 bpm for at least 30 seconds to show improved gaze stabilization and motion tolerance.      PLAN     Extend PT POC from 12/27/24 to 1/31/24    Continue to progress oculomotor, balance, and motion tolerance interventions as tolerated    Ruth Rodgers, PT, DPT, NCS

## 2024-12-10 ENCOUNTER — CLINICAL SUPPORT (OUTPATIENT)
Dept: REHABILITATION | Facility: HOSPITAL | Age: 54
End: 2024-12-10
Payer: COMMERCIAL

## 2024-12-10 DIAGNOSIS — R68.89 DECREASED FUNCTIONAL ACTIVITY TOLERANCE: ICD-10-CM

## 2024-12-10 DIAGNOSIS — R26.89 BALANCE PROBLEM: ICD-10-CM

## 2024-12-10 DIAGNOSIS — H81.8X9 DEFICIT OF VESTIBULO-OCULAR REFLEX: Primary | ICD-10-CM

## 2024-12-10 DIAGNOSIS — R42 DIZZINESS: ICD-10-CM

## 2024-12-10 PROCEDURE — 97530 THERAPEUTIC ACTIVITIES: CPT | Mod: PO

## 2024-12-10 PROCEDURE — 97112 NEUROMUSCULAR REEDUCATION: CPT | Mod: PO

## 2024-12-18 DIAGNOSIS — N95.1 MENOPAUSAL SYMPTOMS: ICD-10-CM

## 2024-12-18 RX ORDER — PROGESTERONE 200 MG/1
200 CAPSULE ORAL NIGHTLY
Qty: 90 CAPSULE | Refills: 1 | Status: SHIPPED | OUTPATIENT
Start: 2024-12-18

## 2024-12-19 ENCOUNTER — OFFICE VISIT (OUTPATIENT)
Dept: NEUROLOGY | Facility: CLINIC | Age: 54
End: 2024-12-19
Payer: COMMERCIAL

## 2024-12-19 VITALS — SYSTOLIC BLOOD PRESSURE: 116 MMHG | DIASTOLIC BLOOD PRESSURE: 72 MMHG | HEART RATE: 80 BPM

## 2024-12-19 DIAGNOSIS — S06.0XAS CONCUSSION WITH UNKNOWN LOSS OF CONSCIOUSNESS STATUS, SEQUELA: Primary | ICD-10-CM

## 2024-12-19 DIAGNOSIS — M54.2 CERVICALGIA OF OCCIPITO-ATLANTO-AXIAL REGION: ICD-10-CM

## 2024-12-19 DIAGNOSIS — F34.89 OTHER SPECIFIED PERSISTENT MOOD DISORDERS: ICD-10-CM

## 2024-12-19 DIAGNOSIS — H51.11 CONVERGENCE INSUFFICIENCY: ICD-10-CM

## 2024-12-19 DIAGNOSIS — R41.89 COGNITIVE CHANGES: ICD-10-CM

## 2024-12-19 DIAGNOSIS — S13.4XXS WHIPLASH INJURIES, SEQUELA: ICD-10-CM

## 2024-12-19 PROCEDURE — 99999 PR PBB SHADOW E&M-EST. PATIENT-LVL IV: CPT | Mod: PBBFAC,,, | Performed by: STUDENT IN AN ORGANIZED HEALTH CARE EDUCATION/TRAINING PROGRAM

## 2024-12-19 NOTE — PATIENT INSTRUCTIONS
Start beginner's yoga and slowly progress as tolerated  Patient was encouraged to do daily light cardio exercise but instructed to limit physical activities to walking, walking in water up to the waist only or riding a stationary bike, recumbent preferred. No weight lifting in upper body, no neck massage, no acupuncture of neck, and no dry needling of upper neck. No neck PT unless otherwise stated. If neck PT is recommended, the therapist may do joint manipulation at this time but no suboccipital soft tissue therapy. Any of your therapists may also do passive neck range of motion activities. No chiropractor work on neck. Lower body strengthening with resistant bands, leg machines, and strapping weights to legs okay. No core body workouts. No running or use of cardio equipment other than stationary bike. No swimming or body surfing. No amusement park rides. No lifting more than 5-10 lbs and bend at the knees, not the waist.  Continue vestibular PT for convergence insufficiency  She was referred to healthy back with a focus on cervical region and they may lessen your exercise restriction as tolerated  We discussed the emotional component as well as was encourage to wear a mouth guard while sleeping to help relieve some of the tension in her TMJ.
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
## 2025-01-03 ENCOUNTER — CLINICAL SUPPORT (OUTPATIENT)
Dept: REHABILITATION | Facility: HOSPITAL | Age: 55
End: 2025-01-03
Payer: COMMERCIAL

## 2025-01-03 DIAGNOSIS — R42 DIZZINESS: ICD-10-CM

## 2025-01-03 DIAGNOSIS — H81.8X9 DEFICIT OF VESTIBULO-OCULAR REFLEX: Primary | ICD-10-CM

## 2025-01-03 DIAGNOSIS — R26.89 BALANCE PROBLEM: ICD-10-CM

## 2025-01-03 DIAGNOSIS — R68.89 DECREASED FUNCTIONAL ACTIVITY TOLERANCE: ICD-10-CM

## 2025-01-03 PROCEDURE — 97530 THERAPEUTIC ACTIVITIES: CPT | Mod: PO

## 2025-01-03 NOTE — PLAN OF CARE
[facility]   Physical Therapy Re-assessment/Treatment Note    Name: Heather Scheuermann Mittie  Clinic Number: [id number]    Therapy Diagnosis:   Encounter Diagnoses   Name Primary?    Deficit of vestibulo-ocular reflex Yes    Dizziness     Balance problem     Decreased functional activity tolerance      Physician: Loida Batres MD    Visit Date: 1/3/2025    Physician Orders: PT Eval and Treat;   Order comments: Referral for vestibular therapy.   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]   Imbalance [R26.89]  Evaluation Date: 8/21/2024  Authorization Period Expiration: 12/31/24  Plan of Care Expiration: 12/27/2024  Updated Plan of Care Expiration: 1/31/2024  Visit # / Visits authorized: 1/20 (26 total visits, 16 vestibular, received in 2024)    Progress Note Due: 1/3/25    PTA Visit #: 0/5     Time In: 0845  Time Out: 0928  Total Billable Time: 43 minutes    SUBJECTIVE     Pt reports: She is feeling pretty good this morning. She states that since starting therapy, she's continued to have difficulty with moving her head quickly as this makes her feel nauseous and start to feel dizzy. Reports improvements in her balance has improved along with her quick eye movements. In her daily life, she reports quick movements and repeated bending when she's cleaning and doing laundry trigger her the most consistently but she denies any limitations in her mobility because of her symptoms.    Response to previous treatment: tolerated well  Functional change: ongoing    Pain: 6-7/10  Location: neck pain    OBJECTIVE     Objective measures updated on 12/3/24.    Saccades: Impaired: able to perform at 100 bpm with no symptoms but occ difficulty maintaining pace; 110 bpm and 120 bpm with improved accuracy and no symptoms   Convergence: impaired at ~17 cm  VOR 1: Impaired: able 120 bpm but with  increased nausea and reported    FOTO Survey: 56%    Treatment     Sana received the treatments listed below:      Sana received therapeutic exercises to develop endurance for 00 minutes including:    NP       Sana participated in neuromuscular re-education activities to improve: Balance and motion tolerance for 00 minutes. The following activities were included:     NP       Sana participated in dynamic functional therapeutic activities to improve functional performance for 43 minutes, including:     Objective testing listed above  FOTO Survey  Time included for discussion of POC     Review of home exercise program:  X 6 laps, Figure 8 walking + bending to tap cones placed ~5 feet apart    Functional Motion Tolerance:    GaitBetter     Duration Speed Distance Obstacles Memory Level Handrails Score   Trial 1 10 min 2.0 mph 573 yards 83% 94% 1 none 2,139      Left Right   Hurdles  6/6 9/12   Puddles 9/12 11/12   Total 83% 83%     Skilled setup and breakdown required.       Sana participated in sensory integration (structured exposure to sensory input) for functional adaptation to environment demands for 00 minutes, including:    NP      Patient Education and Home Exercises     Home Exercises Provided and Patient Education Provided     Education provided:   8/27: - Visual demonstration, verbal instruction, and written handout provided for interventions included as part of pt's home exercise program   - PT provided education on monitoring her symptoms throughout the day to take appropriate rest breaks and improve her tolerance to functional activities    Written Home Exercises Provided: yes. Exercises were reviewed and Sana was able to demonstrate them prior to the end of the session.  Sana demonstrated good  understanding of the education provided. See EMR under Patient Instructions for exercises provided during therapy sessions    ASSESSMENT     Sana Scheuermann Hammond tolerated today's  session well with a focus on reassessment of progress towards goals. She was able to tolerate performing the outcome measures listed above with improvements noted in her saccades, tolerance for VOR x1, and her self-perception of her level of function. To date, she has met 7/8 short and 12/14 long-term goals established at her initial evaluation and prior progress notes. She remains appropriate for skilled physical therapy services at their current frequency for the remainder of the plan of care to continue addressing her functional deficits and goals related to improved tolerance to her daily activities.      Sana Is progressing well towards her goals.   Pt prognosis is Good.     Pt will continue to benefit from skilled outpatient physical therapy to address the deficits listed in the problem list box on initial evaluation, provide pt/family education and to maximize pt's level of independence in the home and community environment.     Pt's spiritual, cultural and educational needs considered and pt agreeable to plan of care and goals.     Anticipated barriers to physical therapy: apprehension/increased fear of falling     Goals:   Short Term Goals: 4 weeks   Pt will receive an individualized home exercise program. Met 8/27/24  Pt will tolerate performing smooth pursuits at >/= 45 bpm for at least 30 seconds to show improved visual tracking. Met 10/22/24  Pt will tolerate performing saccades at >/= 80 bpm to for at least 30 seconds to show improved visual scanning ability. Met 10/22/24  Pt will tolerate performing VOR x1 at >/= 70 bpm for at least 30 seconds to show improved gaze stabilization and motion tolerance. Met 10/22/24  Pt will improve convergence point to </= 36 cm for improved near point focus. Met 9/23/24  Pt will improve postural control with LA condition 6 score of at least 15 s with minimal sway for decreased fall risk with standing ADLs. Met 9/23/24  PT will perform Functional Gait Assessment  and other tests of motion tolerance as indicated to assess pt tolerance to functional activities. Met 8/27/24  PT will assess CRT's as indicated per pt symptoms. Ongoing     Long Term Goals: 8 weeks   Pt will be independent with an individualized home exercise program. Met 10/22/24  Pt will tolerate performing smooth pursuits at >/= 60 bpm for at least 30 seconds to show improved visual tracking. Met 10/22/24  Pt will tolerate performing saccades at >/= 90 bpm to for at least 30 seconds to show improved visual scanning ability. Met 12/3/24  Pt will tolerate performing VOR x1 at >/= 80 bpm for at least 30 seconds to show improved gaze stabilization and motion tolerance. Met 10/22/24  Pt will improve convergence point to </= 25 cm for improved near point focus. Met 10/22/24  Pt will improve postural control with AL condition 2 score of at least 30 s with minimal sway for decreased fall risk with standing ADLs. Met 10/22/24  Pt will improve postural control with LA condition 4 score of at least 10 s with minimal sway for decreased fall risk with standing ADLs. Met 10/22/24  Pt will improve postural control with LA condition 6 score of at least 20 s with minimal sway for decreased fall risk with standing ADLs. Met 9/23/24  Patient will improve her FOTO score from 48%  to at least 60% for improved self perception of functional mobility.(score 0-100, high score indicates greater level of function) Ongoing  Pt will improve Functional Gait Assessment (FGA) score to at least 25/30 for increased independence with home and community ambulation. Met 10/22/24  Pt will improve postural control with LA condition 6 score of at least 30 s with minimal sway for decreased fall risk with standing ADLs Met 10/22/24  Pt will tolerate performing VOR x1 at >/= 100 bpm for at least 30 seconds to show improved gaze stabilization and motion tolerance. Met 12/3/24  Pt will improve convergence point to </= 18 cm for improved near point  focus. Met 1/3/25  Pt will tolerate performing VOR x1 at >/= 120 bpm for at least 30 seconds to show improved gaze stabilization and motion tolerance. Progressing      PLAN     Extend PT POC from 12/27/24 to 1/31/24    Continue to progress oculomotor, balance, and motion tolerance interventions as tolerated. Focus on recreating functional tasks that require bending and quick movements. Give VOR x1 as home exercise program only.    Ruth Rodgers, PT, DPT, NCS

## 2025-01-03 NOTE — PROGRESS NOTES
[facility]   Physical Therapy Re-assessment/Treatment Note    Name: Heather Scheuermann [place]  Clinic Number: [id number]    Therapy Diagnosis:   Encounter Diagnoses   Name Primary?    Deficit of vestibulo-ocular reflex Yes    Dizziness     Balance problem     Decreased functional activity tolerance      Physician: Loida Batres MD    Visit Date: 1/3/2025    Physician Orders: PT Eval and Treat;   Order comments: Referral for vestibular therapy.   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]   Imbalance [R26.89]  Evaluation Date: 8/21/2024  Authorization Period Expiration: 12/31/24  Plan of Care Expiration: 12/27/2024  Updated Plan of Care Expiration: 1/31/2024  Visit # / Visits authorized: 1/20 (26 total visits, 16 vestibular, received in 2024)    Progress Note Due: 1/3/25    PTA Visit #: 0/5     Time In: 0845  Time Out: 0928  Total Billable Time: 43 minutes    SUBJECTIVE     Pt reports: She is feeling pretty good this morning. She states that since starting therapy, she's continued to have difficulty with moving her head quickly as this makes her feel nauseous and start to feel dizzy. Reports improvements in her balance has improved along with her quick eye movements. In her daily life, she reports quick movements and repeated bending when she's cleaning and doing laundry trigger her the most consistently but she denies any limitations in her mobility because of her symptoms.    Response to previous treatment: tolerated well  Functional change: ongoing    Pain: 6-7/10  Location: neck pain    OBJECTIVE     Objective measures updated on 12/3/24.    Saccades: Impaired: able to perform at 100 bpm with no symptoms but occ difficulty maintaining pace; 110 bpm and 120 bpm with improved accuracy and no symptoms   Convergence: impaired at ~17 cm  VOR 1: Impaired: able 120 bpm but with  increased nausea and reported    FOTO Survey: 56%    Treatment     Sana received the treatments listed below:      Sana received therapeutic exercises to develop endurance for 00 minutes including:    NP       Sana participated in neuromuscular re-education activities to improve: Balance and motion tolerance for 00 minutes. The following activities were included:     NP       Sana participated in dynamic functional therapeutic activities to improve functional performance for 43 minutes, including:     Objective testing listed above  FOTO Survey  Time included for discussion of POC     Review of home exercise program:  X 6 laps, Figure 8 walking + bending to tap cones placed ~5 feet apart    Functional Motion Tolerance:    GaitBetter     Duration Speed Distance Obstacles Memory Level Handrails Score   Trial 1 10 min 2.0 mph 573 yards 83% 94% 1 none 2,139      Left Right   Hurdles  6/6 9/12   Puddles 9/12 11/12   Total 83% 83%     Skilled setup and breakdown required.       Sana participated in sensory integration (structured exposure to sensory input) for functional adaptation to environment demands for 00 minutes, including:    NP      Patient Education and Home Exercises     Home Exercises Provided and Patient Education Provided     Education provided:   8/27: - Visual demonstration, verbal instruction, and written handout provided for interventions included as part of pt's home exercise program   - PT provided education on monitoring her symptoms throughout the day to take appropriate rest breaks and improve her tolerance to functional activities    Written Home Exercises Provided: yes. Exercises were reviewed and Sana was able to demonstrate them prior to the end of the session.  Sana demonstrated good  understanding of the education provided. See EMR under Patient Instructions for exercises provided during therapy sessions    ASSESSMENT     Sana Scheuermann [place] tolerated today's  session well with a focus on reassessment of progress towards goals. She was able to tolerate performing the outcome measures listed above with improvements noted in her saccades, tolerance for VOR x1, and her self-perception of her level of function. To date, she has met 7/8 short and 12/14 long-term goals established at her initial evaluation and prior progress notes. She remains appropriate for skilled physical therapy services at their current frequency for the remainder of the plan of care to continue addressing her functional deficits and goals related to improved tolerance to her daily activities.      Sana Is progressing well towards her goals.   Pt prognosis is Good.     Pt will continue to benefit from skilled outpatient physical therapy to address the deficits listed in the problem list box on initial evaluation, provide pt/family education and to maximize pt's level of independence in the home and community environment.     Pt's spiritual, cultural and educational needs considered and pt agreeable to plan of care and goals.     Anticipated barriers to physical therapy: apprehension/increased fear of falling     Goals:   Short Term Goals: 4 weeks   Pt will receive an individualized home exercise program. Met 8/27/24  Pt will tolerate performing smooth pursuits at >/= 45 bpm for at least 30 seconds to show improved visual tracking. Met 10/22/24  Pt will tolerate performing saccades at >/= 80 bpm to for at least 30 seconds to show improved visual scanning ability. Met 10/22/24  Pt will tolerate performing VOR x1 at >/= 70 bpm for at least 30 seconds to show improved gaze stabilization and motion tolerance. Met 10/22/24  Pt will improve convergence point to </= 36 cm for improved near point focus. Met 9/23/24  Pt will improve postural control with LA condition 6 score of at least 15 s with minimal sway for decreased fall risk with standing ADLs. Met 9/23/24  PT will perform Functional Gait Assessment  and other tests of motion tolerance as indicated to assess pt tolerance to functional activities. Met 8/27/24  PT will assess CRT's as indicated per pt symptoms. Ongoing     Long Term Goals: 8 weeks   Pt will be independent with an individualized home exercise program. Met 10/22/24  Pt will tolerate performing smooth pursuits at >/= 60 bpm for at least 30 seconds to show improved visual tracking. Met 10/22/24  Pt will tolerate performing saccades at >/= 90 bpm to for at least 30 seconds to show improved visual scanning ability. Met 12/3/24  Pt will tolerate performing VOR x1 at >/= 80 bpm for at least 30 seconds to show improved gaze stabilization and motion tolerance. Met 10/22/24  Pt will improve convergence point to </= 25 cm for improved near point focus. Met 10/22/24  Pt will improve postural control with LA condition 2 score of at least 30 s with minimal sway for decreased fall risk with standing ADLs. Met 10/22/24  Pt will improve postural control with LA condition 4 score of at least 10 s with minimal sway for decreased fall risk with standing ADLs. Met 10/22/24  Pt will improve postural control with LA condition 6 score of at least 20 s with minimal sway for decreased fall risk with standing ADLs. Met 9/23/24  Patient will improve her FOTO score from 48%  to at least 60% for improved self perception of functional mobility.(score 0-100, high score indicates greater level of function) Ongoing  Pt will improve Functional Gait Assessment (FGA) score to at least 25/30 for increased independence with home and community ambulation. Met 10/22/24  Pt will improve postural control with LA condition 6 score of at least 30 s with minimal sway for decreased fall risk with standing ADLs Met 10/22/24  Pt will tolerate performing VOR x1 at >/= 100 bpm for at least 30 seconds to show improved gaze stabilization and motion tolerance. Met 12/3/24  Pt will improve convergence point to </= 18 cm for improved near point  focus. Met 1/3/25  Pt will tolerate performing VOR x1 at >/= 120 bpm for at least 30 seconds to show improved gaze stabilization and motion tolerance. Progressing      PLAN     Extend PT POC from 12/27/24 to 1/31/24    Continue to progress oculomotor, balance, and motion tolerance interventions as tolerated. Focus on recreating functional tasks that require bending and quick movements. Give VOR x1 as home exercise program only.    Ruth Rodgers, PT, DPT, NCS

## 2025-01-06 RX ORDER — TIZANIDINE 4 MG/1
4 TABLET ORAL NIGHTLY PRN
Qty: 30 TABLET | Refills: 5 | Status: SHIPPED | OUTPATIENT
Start: 2025-01-06

## 2025-01-07 ENCOUNTER — OFFICE VISIT (OUTPATIENT)
Dept: URGENT CARE | Facility: CLINIC | Age: 55
End: 2025-01-07
Payer: COMMERCIAL

## 2025-01-07 VITALS
HEIGHT: 68 IN | SYSTOLIC BLOOD PRESSURE: 122 MMHG | WEIGHT: 185.88 LBS | DIASTOLIC BLOOD PRESSURE: 75 MMHG | BODY MASS INDEX: 28.17 KG/M2 | TEMPERATURE: 99 F | HEART RATE: 83 BPM | RESPIRATION RATE: 18 BRPM | OXYGEN SATURATION: 96 %

## 2025-01-07 DIAGNOSIS — J02.9 SORE THROAT: ICD-10-CM

## 2025-01-07 DIAGNOSIS — J06.9 VIRAL URI: Primary | ICD-10-CM

## 2025-01-07 LAB
CTP QC/QA: YES
CTP QC/QA: YES
MOLECULAR STREP A: NEGATIVE
SARS-COV-2 AG RESP QL IA.RAPID: NEGATIVE

## 2025-01-07 PROCEDURE — 87651 STREP A DNA AMP PROBE: CPT | Mod: QW,S$GLB,, | Performed by: NURSE PRACTITIONER

## 2025-01-07 RX ORDER — PROMETHAZINE HYDROCHLORIDE AND DEXTROMETHORPHAN HYDROBROMIDE 6.25; 15 MG/5ML; MG/5ML
5 SYRUP ORAL EVERY 8 HOURS PRN
Qty: 180 ML | Refills: 0 | Status: SHIPPED | OUTPATIENT
Start: 2025-01-07

## 2025-01-07 RX ORDER — PREDNISONE 20 MG/1
40 TABLET ORAL DAILY
Qty: 10 TABLET | Refills: 0 | Status: SHIPPED | OUTPATIENT
Start: 2025-01-07 | End: 2025-01-12

## 2025-01-07 RX ORDER — CETIRIZINE HYDROCHLORIDE 10 MG/1
10 TABLET ORAL DAILY
Qty: 30 TABLET | Refills: 0 | Status: SHIPPED | OUTPATIENT
Start: 2025-01-07 | End: 2025-02-06

## 2025-01-07 RX ORDER — BENZONATATE 200 MG/1
200 CAPSULE ORAL 3 TIMES DAILY PRN
Qty: 30 CAPSULE | Refills: 0 | Status: SHIPPED | OUTPATIENT
Start: 2025-01-07 | End: 2025-01-17

## 2025-01-07 NOTE — PATIENT INSTRUCTIONS
- You must understand that you have received an Urgent Care treatment only and that you may be released before all of your medical problems are known or treated.   - You, the patient, will arrange for follow up care as instructed.   - If your condition worsens or fails to improve we recommend that you receive another evaluation at the ER immediately or contact your PCP to discuss your concerns.   - You can call [phone] or [phone] to help schedule an appointment with the appropriate provider.    Drink plenty of fluids   Get lots of rest  Tylenol or ibuprofen for pain/fever  Mucinex DM for daytime cough  Prescription cough syrup for night time cough. This medication will make you drowsy. Do not drink alcohol or  Operate machinery while taking this medicine.   Saline nasal rinses to irrigate sinus cavities  Warm salt water gargles for sore throat

## 2025-01-07 NOTE — PROGRESS NOTES
"Subjective:      Patient ID: Heather Scheuermann [place] is a 54 y.o. female.    Vitals:  height is 5' 8" (1.727 m) and weight is 84.3 kg (185 lb 13.6 oz). Her oral temperature is 98.8 °F (37.1 °C). Her blood pressure is 122/75 and her pulse is 83. Her respiration is 18 and oxygen saturation is 96%.     Chief Complaint: Sore Throat and Nasal Congestion    Pt is a 54 year old female c/o congestion and sore throat. Pt states sore throat (started two day ago),  dry cough (causes headaches), nasal congestion with runny nose started yesterday. Pt states center of the throat hurts. Pt states taking robitussin with mild relief.     Sore Throat   This is a new problem. The current episode started in the past 7 days. The problem has been unchanged. Sore throat worse side: center. There has been no fever. The pain is at a severity of 3/10. The pain is mild. Associated symptoms include congestion, coughing, headaches and a hoarse voice. Pertinent negatives include no ear pain, neck pain, shortness of breath or trouble swallowing. She has had no exposure to strep. Treatments tried: robitussin.       HENT:  Positive for congestion and sore throat. Negative for ear pain and trouble swallowing.    Neck: Negative for neck pain.   Respiratory:  Positive for cough. Negative for shortness of breath.    Neurological:  Positive for headaches.      Objective:     Physical Exam   Constitutional: She is oriented to person, place, and time. She appears well-developed. She is cooperative.  Non-toxic appearance. She does not appear ill. No distress.   HENT:   Head: Normocephalic and atraumatic.   Ears:   Right Ear: Hearing, tympanic membrane, external ear and ear canal normal.   Left Ear: Hearing, tympanic membrane, external ear and ear canal normal.   Nose: Mucosal edema and rhinorrhea present. No nasal deformity. No epistaxis. Right sinus exhibits maxillary sinus tenderness and frontal sinus tenderness. Left sinus exhibits maxillary sinus " tenderness and frontal sinus tenderness.   Mouth/Throat: Uvula is midline and mucous membranes are normal. No trismus in the jaw. Normal dentition. No uvula swelling. Posterior oropharyngeal erythema present. No oropharyngeal exudate or posterior oropharyngeal edema. Tonsils are 1+ on the right. Tonsils are 1+ on the left. No tonsillar exudate.   Eyes: Conjunctivae and lids are normal. No scleral icterus.   Neck: Trachea normal and phonation normal. Neck supple. No edema present. No erythema present. No neck rigidity present.   Cardiovascular: Normal rate, regular rhythm, normal heart sounds and normal pulses.   Pulmonary/Chest: Effort normal and breath sounds normal. No respiratory distress. She has no decreased breath sounds. She has no wheezes. She has no rhonchi.   cough         Comments: cough    Abdominal: Normal appearance.   Musculoskeletal: Normal range of motion.         General: No deformity. Normal range of motion.   Neurological: She is alert and oriented to person, place, and time. She exhibits normal muscle tone. Coordination normal.   Skin: Skin is warm, dry, intact, not diaphoretic and not pale.   Psychiatric: Her speech is normal and behavior is normal. Judgment and thought content normal.   Nursing note and vitals reviewed.    Results for orders placed or performed in visit on 01/07/25   POCT Strep A, Molecular    Collection Time: 01/07/25  3:13 PM   Result Value Ref Range    Molecular Strep A, POC Negative Negative     Acceptable Yes    SARS Coronavirus 2 Antigen, POCT Manual Read    Collection Time: 01/07/25  3:13 PM   Result Value Ref Range    SARS Coronavirus 2 Antigen Negative Negative     Acceptable Yes      Assessment:     1. Viral URI    2. Sore throat        Plan:       Viral URI  -     cetirizine (ZYRTEC) 10 MG tablet; Take 1 tablet (10 mg total) by mouth once daily.  Dispense: 30 tablet; Refill: 0  -     promethazine-dextromethorphan (PROMETHAZINE-DM)  6.25-15 mg/5 mL Syrp; Take 5 mLs by mouth every 8 (eight) hours as needed (cough).  Dispense: 180 mL; Refill: 0  -     benzonatate (TESSALON) 200 MG capsule; Take 1 capsule (200 mg total) by mouth 3 (three) times daily as needed for Cough.  Dispense: 30 capsule; Refill: 0  -     predniSONE (DELTASONE) 20 MG tablet; Take 2 tablets (40 mg total) by mouth once daily. for 5 days  Dispense: 10 tablet; Refill: 0    Sore throat  -     POCT Strep A, Molecular  -     SARS Coronavirus 2 Antigen, POCT Manual Read      Patient Instructions   - You must understand that you have received an Urgent Care treatment only and that you may be released before all of your medical problems are known or treated.   - You, the patient, will arrange for follow up care as instructed.   - If your condition worsens or fails to improve we recommend that you receive another evaluation at the ER immediately or contact your PCP to discuss your concerns.   - You can call [phone] or [phone] to help schedule an appointment with the appropriate provider.    Drink plenty of fluids   Get lots of rest  Tylenol or ibuprofen for pain/fever  Mucinex DM for daytime cough  Prescription cough syrup for night time cough. This medication will make you drowsy. Do not drink alcohol or  Operate machinery while taking this medicine.   Saline nasal rinses to irrigate sinus cavities  Warm salt water gargles for sore throat

## 2025-01-08 ENCOUNTER — PATIENT MESSAGE (OUTPATIENT)
Dept: INTERNAL MEDICINE | Facility: CLINIC | Age: 55
End: 2025-01-08
Payer: COMMERCIAL

## 2025-01-08 NOTE — TELEPHONE ENCOUNTER
Pt reports going to urgent care on 1/7 and was diagnosed with viral URI and was prescribed a few different medications    Pt would like providers opinion as to whether or not the medications prescribed will help     LOV with Lisa Painter MD , 4/8/2024

## 2025-01-16 DIAGNOSIS — Z12.31 OTHER SCREENING MAMMOGRAM: ICD-10-CM

## 2025-01-20 ENCOUNTER — PATIENT MESSAGE (OUTPATIENT)
Dept: PAIN MEDICINE | Facility: CLINIC | Age: 55
End: 2025-01-20
Payer: COMMERCIAL

## 2025-01-23 ENCOUNTER — TELEPHONE (OUTPATIENT)
Dept: SPINE | Facility: CLINIC | Age: 55
End: 2025-01-23
Payer: COMMERCIAL

## 2025-01-23 NOTE — TELEPHONE ENCOUNTER
Patient was left a detailed message to return my call to discuss rescheduling her appointment for [facility].

## 2025-01-24 NOTE — TELEPHONE ENCOUNTER
Second Attempt made contacting patient,in regards to rescheduling her appointment with Dr. Reynoso in [facility].  Patient is to return my call.  Thanks.

## 2025-01-28 ENCOUNTER — HOSPITAL ENCOUNTER (OUTPATIENT)
Dept: RADIOLOGY | Facility: HOSPITAL | Age: 55
Discharge: HOME | End: 2025-01-28
Attending: INTERNAL MEDICINE
Payer: COMMERCIAL

## 2025-01-28 VITALS — WEIGHT: 185 LBS | HEIGHT: 68 IN | BODY MASS INDEX: 28.04 KG/M2

## 2025-01-28 DIAGNOSIS — Z12.31 OTHER SCREENING MAMMOGRAM: ICD-10-CM

## 2025-01-28 PROCEDURE — 77067 SCR MAMMO BI INCL CAD: CPT | Mod: 26,,, | Performed by: RADIOLOGY

## 2025-01-28 PROCEDURE — 77063 BREAST TOMOSYNTHESIS BI: CPT | Mod: 26,,, | Performed by: RADIOLOGY

## 2025-01-28 PROCEDURE — 77063 BREAST TOMOSYNTHESIS BI: CPT | Mod: TC

## 2025-02-03 ENCOUNTER — DOCUMENTATION ONLY (OUTPATIENT)
Dept: REHABILITATION | Facility: HOSPITAL | Age: 55
End: 2025-02-03
Payer: COMMERCIAL

## 2025-02-03 NOTE — PROGRESS NOTES
OUTPATIENT PHYSICAL THERAPY DISCHARGE SUMMARY     Name: Heather Scheuermann [facility] Number: [id number]    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]   Imbalance [R26.89]   Physician: Loida Batres MD   Treatment Orders: PT Eval and Treat  Past Medical History:   Diagnosis Date    Endometriosis        Initial visit: 24  Date of Last visit: 1/3/35  Date of Discharge Note:  2/3/25  Total Visits Received: 17  Missed Visits: 4 consecutive  ASSESSMENT   Status Towards Goals Met:   See PT visit on 1/3/25 for most recent status towards goals    Goals Not achieved and why: Pt canceled 4 consecutive appointments and did not call to reschedule.         Discharge reason : Non-Compliance with attendance, Pt has not re-scheduled further follow-up sessions, and POC has     PLAN   This patient is discharged from Outpatient Physical Therapy Services.     Ruth Rodgers, PT  2025

## 2025-02-11 ENCOUNTER — TELEPHONE (OUTPATIENT)
Dept: RADIOLOGY | Facility: HOSPITAL | Age: 55
End: 2025-02-11
Payer: COMMERCIAL

## 2025-02-11 NOTE — TELEPHONE ENCOUNTER
----- Message from Tiffanie sent at 2/11/2025 12:39 PM CST -----  Regarding: APPT  Contact: PT@[phone]  Pt is calling to speak to someone in the office to r/s her appt that she is currently scheduled for; ON 2/12 no available appts in Epic. Please call to advise. [phone].     Patient's DX:     Additional Info:

## 2025-02-21 ENCOUNTER — TELEPHONE (OUTPATIENT)
Facility: CLINIC | Age: 55
End: 2025-02-21
Payer: COMMERCIAL

## 2025-04-02 DIAGNOSIS — N95.1 MENOPAUSAL SYMPTOMS: Primary | ICD-10-CM

## 2025-04-03 ENCOUNTER — PATIENT MESSAGE (OUTPATIENT)
Dept: OBSTETRICS AND GYNECOLOGY | Facility: CLINIC | Age: 55
End: 2025-04-03
Payer: COMMERCIAL

## 2025-04-03 ENCOUNTER — TELEPHONE (OUTPATIENT)
Dept: OBSTETRICS AND GYNECOLOGY | Facility: CLINIC | Age: 55
End: 2025-04-03
Payer: COMMERCIAL

## 2025-04-03 NOTE — TELEPHONE ENCOUNTER
----- Message from [place] sent at 4/3/2025 12:59 PM CDT -----  Regarding: refill  Who Called:pt   Refill or New Rx: Refill  RX Name and StrengthestradioL (VIVELLE-DOT) 0.1 mg/24 hr PTSWPreferred Pharmacy with phone number:  [facility]/pharmacy [facility][id number] - [place], [place] - [address] Phone: [phone]: [phone] or Mail Order:local Would the patient rather a call back or a response via [facility]best   Call Back Number: [phone]  Additional Information:pt is out/ she did make first av appt which is 5/19. She asks if you can please fill today

## 2025-04-04 ENCOUNTER — TELEPHONE (OUTPATIENT)
Facility: CLINIC | Age: 55
End: 2025-04-04
Payer: COMMERCIAL

## 2025-04-04 NOTE — TELEPHONE ENCOUNTER
Called Pt but was unable to speak with her. [facility]     ----- Message from [place] sent at 4/3/2025  1:05 PM CDT -----  Regarding: appt  Name of Who is Calling:pt What is the request in detail: pt is calling to make a follow up appt/ I was unable to yariel her. Please call to schedule. Can the clinic reply by [facility]:What Number to Call Back if not in [facility]: [phone]

## 2025-04-09 RX ORDER — ESTRADIOL 0.1 MG/D
1 FILM, EXTENDED RELEASE TRANSDERMAL
Qty: 8 PATCH | Refills: 4 | Status: SHIPPED | OUTPATIENT
Start: 2025-04-10 | End: 2026-04-10

## 2025-04-21 DIAGNOSIS — F32.A DEPRESSION, UNSPECIFIED DEPRESSION TYPE: ICD-10-CM

## 2025-04-21 RX ORDER — ESCITALOPRAM OXALATE 10 MG/1
10 TABLET ORAL DAILY
Qty: 90 TABLET | Refills: 0 | Status: SHIPPED | OUTPATIENT
Start: 2025-04-21

## 2025-04-21 NOTE — TELEPHONE ENCOUNTER
No care due was identified.  [facility] Embedded Care Due Messages. Reference number: [id number].   4/21/2025 1:39:32 PM CDT

## 2025-04-21 NOTE — TELEPHONE ENCOUNTER
----- Message from Med Assistant Colin sent at 4/21/2025  1:23 PM CDT -----  Name of Person who is Calling: HAMMOND, HEATHER SCHEUERMANN [[id number]]Can the clinic reply in [facility]: No Please refill the medication(s) listed below: Please contact the patient once the prescription(s) is has been called in at this phone number below.    Medication #1 EScitalopram oxalate (LEXAPRO) 10 MG tablet Medication #2  Preferred Pharmacy: [facility]/pharmacy [facility][id number] - [facility] [address] Phone: [phone]'s Contact Information: [phone]

## 2025-05-07 ENCOUNTER — OFFICE VISIT (OUTPATIENT)
Dept: OBSTETRICS AND GYNECOLOGY | Facility: CLINIC | Age: 55
End: 2025-05-07
Payer: COMMERCIAL

## 2025-05-07 ENCOUNTER — OFFICE VISIT (OUTPATIENT)
Facility: CLINIC | Age: 55
End: 2025-05-07
Payer: COMMERCIAL

## 2025-05-07 VITALS — SYSTOLIC BLOOD PRESSURE: 114 MMHG | DIASTOLIC BLOOD PRESSURE: 71 MMHG | HEART RATE: 89 BPM

## 2025-05-07 VITALS
DIASTOLIC BLOOD PRESSURE: 70 MMHG | SYSTOLIC BLOOD PRESSURE: 128 MMHG | BODY MASS INDEX: 29.27 KG/M2 | HEIGHT: 68 IN | WEIGHT: 193.13 LBS

## 2025-05-07 DIAGNOSIS — S06.0XAS CONCUSSION WITH UNKNOWN LOSS OF CONSCIOUSNESS STATUS, SEQUELA: Primary | ICD-10-CM

## 2025-05-07 DIAGNOSIS — Z01.419 WOMEN'S ANNUAL ROUTINE GYNECOLOGICAL EXAMINATION: Primary | ICD-10-CM

## 2025-05-07 DIAGNOSIS — S13.4XXS WHIPLASH INJURIES, SEQUELA: ICD-10-CM

## 2025-05-07 DIAGNOSIS — Z12.4 PAP SMEAR FOR CERVICAL CANCER SCREENING: ICD-10-CM

## 2025-05-07 DIAGNOSIS — Z32.02 NEGATIVE PREGNANCY TEST: ICD-10-CM

## 2025-05-07 DIAGNOSIS — Z79.890 POSTMENOPAUSAL HRT (HORMONE REPLACEMENT THERAPY): ICD-10-CM

## 2025-05-07 DIAGNOSIS — N95.0 POSTMENOPAUSAL BLEEDING: ICD-10-CM

## 2025-05-07 DIAGNOSIS — R92.8 ABNORMAL MAMMOGRAM OF LEFT BREAST: ICD-10-CM

## 2025-05-07 LAB
B-HCG UR QL: NEGATIVE
CTP QC/QA: YES

## 2025-05-07 PROCEDURE — 99999 PR PBB SHADOW E&M-EST. PATIENT-LVL III: CPT | Mod: PBBFAC,,, | Performed by: OBSTETRICS & GYNECOLOGY

## 2025-05-07 PROCEDURE — 99999 PR PBB SHADOW E&M-EST. PATIENT-LVL III: CPT | Mod: PBBFAC,,, | Performed by: STUDENT IN AN ORGANIZED HEALTH CARE EDUCATION/TRAINING PROGRAM

## 2025-05-07 RX ORDER — ESTRADIOL 0.05 MG/D
1 FILM, EXTENDED RELEASE TRANSDERMAL
Qty: 8 PATCH | Refills: 12 | Status: SHIPPED | OUTPATIENT
Start: 2025-05-08 | End: 2025-06-07

## 2025-05-07 RX ORDER — PROGESTERONE 200 MG/1
200 CAPSULE ORAL NIGHTLY
Qty: 90 CAPSULE | Refills: 3 | Status: SHIPPED | OUTPATIENT
Start: 2025-05-07
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
## 2025-05-07 NOTE — PROGRESS NOTES
"Heather Scheuermann Hammond is a 55 y.o. year old  female who presents for routine GYN exam.  She has a history of being postmenopausal without any bleeding for more than one year prior to starting HRT.  Because of menopausal symptoms, [facility] and began Estradiol .1 mg patches twice weekly and Prometrium 200 mg nightly about one year ago.  With HRT, her menopausal symptoms have essentially resolved.  However, she describes having random episodes of vaginal bleeding occurring every 2-8 weeks - sometimes spotting, other times with flow like a period.  Denies missed or late Prometrium / patches.  Currently, she is experiencing brownish spotting.  Denies pelvic pain.  No fever.  Requests pap today.  Mammogram performed 25 was incomplete with recommendation for diagnostic left mammogram / ultrasound- she has not yet followed up for this imaging .  Denies any recent changes in her medical / surgical history.    Blood pressure 128/70, height 5' 8" (1.727 m), weight 87.6 kg (193 lb 2 oz), last menstrual period 2025.    25 Mammogram: Incomplete    24 Pap: Negative, HPV: Negative    24 E2 <10, FSH 62.58,  Free Testosterone .6    Past Medical History:   Diagnosis Date    Endometriosis            Past Surgical History:   Procedure Laterality Date    COLONOSCOPY N/A 2021    Surgeon: Rafael Leal MD    EXPLORATORY LAPAROTOMY      OVARIAN CYST    PELVIC LAPAROSCOPY      X 2---INFERTILITY AND ENDOMETRIOSIS    PLEURA BIOPSY  2015       OB History          1    Para        Term   0            AB   1    Living             SAB        IAB        Ectopic        Multiple        Live Births   1                   ROS:  GENERAL: Feeling well overall.   SKIN: Denies rash or lesions.   HEAD: Denies head injury or headache.   NODES: Denies enlarged lymph nodes.   CHEST: Denies chest pain or shortness of breath.   CARDIOVASCULAR: Denies palpitations " or left sided chest pain.   ABDOMEN: No abdominal pain, nausea, vomiting or rectal bleeding.   URINARY: No dysuria or hematuria.  REPRODUCTIVE: See HPI.   BREASTS: Denies pain, lumps, or nipple discharge.   HEMATOLOGIC:  Reports random episodes of postmenopausal bleeding since initiation of HRT.  MUSCULOSKELETAL: Denies joint pain.  NEUROLOGIC: Denies syncope or weakness.   PSYCHIATRIC: Denies depression.     PE:   (chaperone present during entire exam)  APPEARANCE: Well nourished, well developed, in no acute distress.  BREASTS: Symmetrical, no skin changes or visible lesions. No palpable masses, nipple discharge or adenopathy bilaterally.  ABDOMEN: Soft. No tenderness or masses.   VULVA: No lesions. Normal female genitalia.  URETHRAL MEATUS: Normal size and location, no lesions, no prolapse.  URETHRA: No masses, tenderness, prolapse or scarring.  VAGINA: No lesions, minimal brown blood in vault.  CERVIX: No lesions and discharge. PAP done.  UTERUS: Normal size, regular shape, mobile, non-tender, bladder base nontender.  ADNEXA: No masses, tenderness or CDS nodularity.  ANUS PERINEUM: Normal.      Diagnosis:  1. Women's annual routine gynecological examination    2. Postmenopausal HRT (hormone replacement therapy)    3. Postmenopausal bleeding    4. Pap smear for cervical cancer screening    5. Negative pregnancy test    6. Abnormal mammogram of left breast          PLAN:    Orders Placed This Encounter    US Pelvis Comp with Transvag NON-OB (xpd    Mammo Digital Diagnostic Left with Christopher (XPD)    US Breast Left Limited    POCT Urine Pregnancy    Liquid-Based Pap Smear, Screening    estradiol 0.05 mg/24 hr td ptsw (VIVELLE-DOT) 0.05 mg/24 hr    progesterone (PROMETRIUM) 200 MG capsule       Patient was counseled today on postmenopausal issues and her usage of HRT.  With estrogen patches and Prometrium, menopausal symptoms have essentially resolved, but she has been experiencing random bleeding since initiation of  HRT.  We discussed the need for additional evaluation, initially with pelvic ultrasound.  If her ES is abnormal in appearance, she will need additional testing of her endometrium.  We discussed reduction of her estrogen dosage  to .05 mg patches with continuation of Prometrium 200 mg nightly.  We also discussed her incomplete screening mammogram and the recommendation for follow up breast imaging diagnostic mammogram and breast ultrasound.    Follow-up after imaging.  Annual exam in one year.    This note was created with voice recognition software.  Grammatical, syntax and spelling errors may be inevitable.

## 2025-05-14 ENCOUNTER — HOSPITAL ENCOUNTER (OUTPATIENT)
Dept: RADIOLOGY | Facility: OTHER | Age: 55
Discharge: HOME | End: 2025-05-14
Attending: OBSTETRICS & GYNECOLOGY
Payer: COMMERCIAL

## 2025-05-14 DIAGNOSIS — N95.0 POSTMENOPAUSAL BLEEDING: ICD-10-CM

## 2025-05-14 PROCEDURE — 76856 US EXAM PELVIC COMPLETE: CPT | Mod: 26,,, | Performed by: RADIOLOGY

## 2025-05-14 PROCEDURE — 76830 TRANSVAGINAL US NON-OB: CPT | Mod: 26,,, | Performed by: RADIOLOGY

## 2025-05-14 PROCEDURE — 76856 US EXAM PELVIC COMPLETE: CPT | Mod: TC

## 2025-05-15 ENCOUNTER — RESULTS FOLLOW-UP (OUTPATIENT)
Dept: OBSTETRICS AND GYNECOLOGY | Facility: CLINIC | Age: 55
End: 2025-05-15

## 2025-05-15 ENCOUNTER — TELEPHONE (OUTPATIENT)
Dept: OBSTETRICS AND GYNECOLOGY | Facility: CLINIC | Age: 55
End: 2025-05-15
Payer: COMMERCIAL

## 2025-05-15 NOTE — TELEPHONE ENCOUNTER
Called patient:    Discussed results of pelvic sono:    FINDINGS:  Uterus:  Size: 8.0 x 4.2 x 4.4 cm  Masses: 2.7 cm anterior fibroid.  Endometrium: Minimally thickened in this post menopausal patient, measuring 6 mm.  Right ovary:  Size: 2.1 x 1.2 x 2.4 cm  Appearance: Normal  Vascular flow: Normal.  Left ovary:  Size: 1.9 x 0.8 x 1.7 cm  Appearance: Normal  Vascular Flow: Normal.  Free Fluid:  None.  Impression:  Minimal thickening of the endometrial stripe in this postmenopausal patient to 6 mm.  Further evaluation as clinically indicated.  Uterine fibroid.    We discussed her mildly thickened endometrium with postmenopausal bleeding and the recommendation for EMBX - will schedule.    Discussed Cytotec to help with cervical dilation as well as possible Xanax.    Aware of negative pap

## 2025-05-15 NOTE — TELEPHONE ENCOUNTER
----- Message from Odalis sent at 5/15/2025  9:16 AM CDT -----  Pt called in returning a call from Dr. Singh.

## 2025-05-27 ENCOUNTER — TELEPHONE (OUTPATIENT)
Dept: OBSTETRICS AND GYNECOLOGY | Facility: CLINIC | Age: 55
End: 2025-05-27
Payer: COMMERCIAL

## 2025-05-27 RX ORDER — ESTRADIOL 0.05 MG/D
FILM, EXTENDED RELEASE TRANSDERMAL
Qty: 24 PATCH | Refills: 5 | OUTPATIENT
Start: 2025-05-27

## 2025-05-27 RX ORDER — ESTRADIOL 0.05 MG/D
1 FILM, EXTENDED RELEASE TRANSDERMAL
Qty: 24 PATCH | Refills: 2 | Status: SHIPPED | OUTPATIENT
Start: 2025-05-29 | End: 2026-02-05

## 2025-05-27 NOTE — TELEPHONE ENCOUNTER
Name from pharmacy: ESTRADIOL 0.05 MG PATCH (2/WK)         Will file in chart as: estradiol 0.05 mg/24 hr td ptsw (VIVELLE-DOT) 0.05 mg/24 hr    Sig: APPLY 1 PATCH TO THE SKIN TWICE A WEEK    Disp: 24 patch    Refills: 5    Start: 5/27/2025    Class: Normal    Refused by: Haley Rod MA    Refusal reason: Duplicate    Pharmacy comment: REQUEST FOR 90 DAYS PRESCRIPTION.        Fill requested from: [facility]/pharmacy #[id number] - [place], [place] [facility]

## 2025-06-09 ENCOUNTER — PATIENT MESSAGE (OUTPATIENT)
Dept: OBSTETRICS AND GYNECOLOGY | Facility: CLINIC | Age: 55
End: 2025-06-09
Payer: COMMERCIAL

## 2025-06-10 ENCOUNTER — TELEPHONE (OUTPATIENT)
Dept: INTERNAL MEDICINE | Facility: CLINIC | Age: 55
End: 2025-06-10
Payer: COMMERCIAL

## 2025-06-11 ENCOUNTER — LAB VISIT (OUTPATIENT)
Dept: LAB | Facility: HOSPITAL | Age: 55
End: 2025-06-11
Attending: INTERNAL MEDICINE
Payer: COMMERCIAL

## 2025-06-11 ENCOUNTER — OFFICE VISIT (OUTPATIENT)
Dept: INTERNAL MEDICINE | Facility: CLINIC | Age: 55
End: 2025-06-11
Payer: COMMERCIAL

## 2025-06-11 VITALS
SYSTOLIC BLOOD PRESSURE: 114 MMHG | DIASTOLIC BLOOD PRESSURE: 64 MMHG | BODY MASS INDEX: 29.6 KG/M2 | HEIGHT: 68 IN | HEART RATE: 80 BPM | OXYGEN SATURATION: 98 % | WEIGHT: 195.31 LBS

## 2025-06-11 DIAGNOSIS — Z23 NEED FOR TDAP VACCINATION: ICD-10-CM

## 2025-06-11 DIAGNOSIS — Z00.00 ANNUAL PHYSICAL EXAM: ICD-10-CM

## 2025-06-11 DIAGNOSIS — Z00.00 ANNUAL PHYSICAL EXAM: Primary | ICD-10-CM

## 2025-06-11 DIAGNOSIS — F32.A DEPRESSION, UNSPECIFIED DEPRESSION TYPE: ICD-10-CM

## 2025-06-11 DIAGNOSIS — R53.83 FATIGUE, UNSPECIFIED TYPE: ICD-10-CM

## 2025-06-11 LAB
ALBUMIN SERPL BCP-MCNC: 4.3 G/DL (ref 3.5–5.2)
ALP SERPL-CCNC: 78 UNIT/L (ref 40–150)
ALT SERPL W/O P-5'-P-CCNC: 29 UNIT/L (ref 10–44)
ANION GAP (OHS): 10 MMOL/L (ref 8–16)
AST SERPL-CCNC: 17 UNIT/L (ref 11–45)
BILIRUB SERPL-MCNC: 0.5 MG/DL (ref 0.1–1)
BUN SERPL-MCNC: 14 MG/DL (ref 6–20)
CALCIUM SERPL-MCNC: 8.8 MG/DL (ref 8.7–10.5)
CHLORIDE SERPL-SCNC: 99 MMOL/L (ref 95–110)
CO2 SERPL-SCNC: 26 MMOL/L (ref 23–29)
CREAT SERPL-MCNC: 0.8 MG/DL (ref 0.5–1.4)
ERYTHROCYTE [DISTWIDTH] IN BLOOD BY AUTOMATED COUNT: 11.9 % (ref 11.5–14.5)
GFR SERPLBLD CREATININE-BSD FMLA CKD-EPI: >60 ML/MIN/1.73/M2
GLUCOSE SERPL-MCNC: 97 MG/DL (ref 70–110)
HCT VFR BLD AUTO: 40.6 % (ref 37–48.5)
HGB BLD-MCNC: 13.6 GM/DL (ref 12–16)
MCH RBC QN AUTO: 30.7 PG (ref 27–31)
MCHC RBC AUTO-ENTMCNC: 33.5 G/DL (ref 32–36)
MCV RBC AUTO: 92 FL (ref 82–98)
PLATELET # BLD AUTO: 266 K/UL (ref 150–450)
PMV BLD AUTO: 10.2 FL (ref 9.2–12.9)
POTASSIUM SERPL-SCNC: 4.7 MMOL/L (ref 3.5–5.1)
PROT SERPL-MCNC: 7.4 GM/DL (ref 6–8.4)
RBC # BLD AUTO: 4.43 M/UL (ref 4–5.4)
SODIUM SERPL-SCNC: 135 MMOL/L (ref 136–145)
TSH SERPL-ACNC: 0.84 UIU/ML (ref 0.4–4)
WBC # BLD AUTO: 5.31 K/UL (ref 3.9–12.7)

## 2025-06-11 PROCEDURE — 99999 PR PBB SHADOW E&M-EST. PATIENT-LVL III: CPT | Mod: PBBFAC,,, | Performed by: INTERNAL MEDICINE

## 2025-06-11 PROCEDURE — 80053 COMPREHEN METABOLIC PANEL: CPT

## 2025-06-11 PROCEDURE — 85027 COMPLETE CBC AUTOMATED: CPT

## 2025-06-11 PROCEDURE — 36415 COLL VENOUS BLD VENIPUNCTURE: CPT

## 2025-06-11 PROCEDURE — 84443 ASSAY THYROID STIM HORMONE: CPT

## 2025-06-11 RX ORDER — ESCITALOPRAM OXALATE 10 MG/1
10 TABLET ORAL DAILY
Qty: 90 TABLET | Refills: 2 | Status: SHIPPED | OUTPATIENT
Start: 2025-06-11

## 2025-06-11 RX ORDER — TIRZEPATIDE 2.5 MG/.5ML
2.5 INJECTION, SOLUTION SUBCUTANEOUS
Qty: 4 PEN | Refills: 0 | Status: SHIPPED | OUTPATIENT
Start: 2025-06-11 | End: 2025-06-13 | Stop reason: SDUPTHER

## 2025-06-11 RX ORDER — HYDROCORTISONE 25 MG/G
CREAM TOPICAL 2 TIMES DAILY
Qty: 28 G | Refills: 1 | Status: SHIPPED | OUTPATIENT
Start: 2025-06-11

## 2025-06-11 NOTE — PROGRESS NOTES
Subjective     Patient ID: Heather Scheuermann Hammond is a 55 y.o. female.    Chief Complaint: Annual Exam  /wt loss management  HPI  Struggling to lose wt.    No h/o med ca thyroid, pancreatitis..      Difficult to control hunger.       Very tired.   Prevents exercise.    She snores.   hasn't noted apnea.     No falling asleep unintentionally, or while driving.    Depression controlled with lexapro..    HRT - given by gyn.    Occas back pain.    Occas hemorrhoids .  Would like proctosol hc refilled.          Review of Systems   Constitutional:  Negative for fever and unexpected weight change.   HENT:  Negative for nasal congestion and postnasal drip.    Respiratory:  Negative for chest tightness, shortness of breath and wheezing.    Cardiovascular:  Negative for chest pain and leg swelling.   Gastrointestinal:  Negative for abdominal pain, anal bleeding, constipation, diarrhea, nausea and vomiting.   Genitourinary:  Negative for dysuria and urgency.   Integumentary:  Negative for rash.   Neurological:  Negative for headaches.   Psychiatric/Behavioral:  Negative for dysphoric mood and sleep disturbance. The patient is not nervous/anxious.           Objective     Physical Exam  Constitutional:       General: She is not in acute distress.     Appearance: She is well-developed.   HENT:      Head: Normocephalic and atraumatic.      Right Ear: External ear normal.      Left Ear: External ear normal.      Nose: Nose normal.   Eyes:      General: No scleral icterus.        Right eye: No discharge.         Left eye: No discharge.      Conjunctiva/sclera: Conjunctivae normal.      Pupils: Pupils are equal, round, and reactive to light.   Neck:      Thyroid: No thyromegaly.      Vascular: No JVD.   Cardiovascular:      Rate and Rhythm: Normal rate and regular rhythm.      Heart sounds: Normal heart sounds. No murmur heard.     No gallop.   Pulmonary:      Effort: Pulmonary effort is normal. No respiratory distress.       Breath sounds: Normal breath sounds. No wheezing or rales.   Abdominal:      General: Bowel sounds are normal. There is no distension.      Palpations: Abdomen is soft. There is no mass.      Tenderness: There is no abdominal tenderness. There is no guarding or rebound.   Musculoskeletal:         General: No tenderness. Normal range of motion.      Cervical back: Normal range of motion and neck supple.   Lymphadenopathy:      Cervical: No cervical adenopathy.   Skin:     General: Skin is warm and dry.      Findings: No rash.   Neurological:      Mental Status: She is alert and oriented to person, place, and time.      Cranial Nerves: No cranial nerve deficit.      Coordination: Coordination normal.   Psychiatric:         Behavior: Behavior normal.         Thought Content: Thought content normal.         Judgment: Judgment normal.            Assessment and Plan     1. Annual physical exam  -     Comprehensive Metabolic Panel; Future; Expected date: 06/11/2025  -     CBC Without Differential; Future; Expected date: 06/11/2025  -     TSH; Future; Expected date: 06/11/2025    2. BMI 30.0-30.9,adult  -     tirzepatide, weight loss, (ZEPBOUND) 2.5 mg/0.5 mL PnIj; Inject 2.5 mg into the skin every 7 days.  Dispense: 4 Pen; Refill: 0    3. Fatigue, unspecified type    4. Depression, unspecified depression type  -     EScitalopram oxalate (LEXAPRO) 10 MG tablet; Take 1 tablet (10 mg total) by mouth once daily.  Dispense: 90 tablet; Refill: 2    5. Need for Tdap vaccination  -     DIPH,PERTUSS(ACEL),TET VAC(PF)(ADULT)(ADACEL)(TDaP)    Other orders  -     hydrocortisone 2.5 % cream; Apply topically 2 (two) times daily.  Dispense: 28 g; Refill: 1               Tdap  Follow up in about 3 months (around 9/11/2025) for virtual visit.    E Terri direct to consumer  if insurance doesn't cover zepbound.

## 2025-06-12 ENCOUNTER — PATIENT MESSAGE (OUTPATIENT)
Dept: INTERNAL MEDICINE | Facility: CLINIC | Age: 55
End: 2025-06-12
Payer: COMMERCIAL

## 2025-06-12 ENCOUNTER — RESULTS FOLLOW-UP (OUTPATIENT)
Dept: INTERNAL MEDICINE | Facility: CLINIC | Age: 55
End: 2025-06-12

## 2025-06-13 RX ORDER — TIRZEPATIDE 2.5 MG/.5ML
2.5 INJECTION, SOLUTION SUBCUTANEOUS
Qty: 4 PEN | Refills: 0 | Status: SHIPPED | OUTPATIENT
Start: 2025-06-13

## 2025-06-13 NOTE — TELEPHONE ENCOUNTER
LOV with Lisa Painter MD , 6/11/2025  Pt states she was told that zepbound may be cheaper if she uses [facility]. She would liek the rx sent to [facility] on [address].

## 2025-06-13 NOTE — TELEPHONE ENCOUNTER
Care Due:                  Date            Visit Type   Department     Provider  --------------------------------------------------------------------------------                                EP -                              PRIMARY      [facility]  Last Visit: 06-      CARE ([facility])   MEDICINE       Lisa Painter  Next Visit: None Scheduled  None         None Found                                                            Last  Test          Frequency    Reason                     Performed    Due Date  --------------------------------------------------------------------------------    HBA1C.......  6 months...  tirzepatide,.............  01- 07-    [facility] Embedded Care Due Messages. Reference number: [id number].   6/13/2025 12:03:56 PM CDT

## 2025-06-16 ENCOUNTER — PATIENT MESSAGE (OUTPATIENT)
Dept: INTERNAL MEDICINE | Facility: CLINIC | Age: 55
End: 2025-06-16
Payer: COMMERCIAL

## 2025-06-23 ENCOUNTER — PATIENT MESSAGE (OUTPATIENT)
Dept: OBSTETRICS AND GYNECOLOGY | Facility: CLINIC | Age: 55
End: 2025-06-23
Payer: COMMERCIAL

## 2025-06-30 ENCOUNTER — PATIENT MESSAGE (OUTPATIENT)
Dept: INTERNAL MEDICINE | Facility: CLINIC | Age: 55
End: 2025-06-30
Payer: COMMERCIAL

## 2025-07-01 ENCOUNTER — TELEPHONE (OUTPATIENT)
Dept: INTERNAL MEDICINE | Facility: CLINIC | Age: 55
End: 2025-07-01
Payer: COMMERCIAL

## 2025-07-01 NOTE — TELEPHONE ENCOUNTER
Copied from CRM #[id number]. Topic: General Inquiry - Return Call  >> Jun 30, 2025  3:01 PM Vandana wrote:  Patient is returning a phone call.    Who left a message for the patient: Halima Cruz LPN    Does patient know what this is regarding:      Would you like a call back, or a response through your [facility] portal?:   Call Back Please. Thank you    Best call back number: Pt would like to speak about her medication please. Thank you    Comments:

## 2025-07-14 ENCOUNTER — TELEPHONE (OUTPATIENT)
Dept: OBSTETRICS AND GYNECOLOGY | Facility: CLINIC | Age: 55
End: 2025-07-14
Payer: COMMERCIAL

## 2025-07-14 NOTE — TELEPHONE ENCOUNTER
----- Message from Med Assistant Haley sent at 5/16/2025  4:31 PM CDT -----    (Newest Message First)  View All Conversations on this Encounter  Layla Hartmann MA  YouYesterday (1:34 PM)    TB  Dr. Chilel wants you to handle the details. He said talk to him when you see this message     Jake Chilel MD [facility] William T StaffYesterday (1:23 PM)      Please help her schedule EMBX - she is aware.  She will need cytotec as well as possible Xanax     Jake Chilel MDYesterday (1:23 PM)      Called patient:     Discussed results of pelvic sono:     FINDINGS:  Uterus:  Size: 8.0 x 4.2 x 4.4 cm  Masses: 2.7 cm anterior fibroid.  Endometrium: Minimally thickened in this post menopausal patient, measuring 6 mm.  Right ovary:  Size: 2.1 x 1.2 x 2.4 cm  Appearance: Normal  Vascular flow: Normal.  Left ovary:  Size: 1.9 x 0.8 x 1.7 cm  Appearance: Normal  Vascular Flow: Normal.  Free Fluid:  None.  Impression:  Minimal thickening of the endometrial stripe in this postmenopausal patient to 6 mm.  Further evaluation as clinically indicated.  Uterine fibroid.     We discussed her mildly thickened endometrium with postmenopausal bleeding and the recommendation for EMBX - will schedule.     Discussed Cytotec to help with cervical dilation as well as possible Xanax.     Aware of negative pap

## 2025-08-22 ENCOUNTER — HOSPITAL ENCOUNTER (OUTPATIENT)
Dept: RADIOLOGY | Facility: OTHER | Age: 55
Discharge: HOME | End: 2025-08-22
Attending: OBSTETRICS & GYNECOLOGY
Payer: COMMERCIAL

## 2025-08-22 ENCOUNTER — PATIENT MESSAGE (OUTPATIENT)
Dept: OBSTETRICS AND GYNECOLOGY | Facility: CLINIC | Age: 55
End: 2025-08-22
Payer: COMMERCIAL

## 2025-08-22 DIAGNOSIS — R92.8 ABNORMAL MAMMOGRAM OF LEFT BREAST: ICD-10-CM

## 2025-08-22 PROCEDURE — 77061 BREAST TOMOSYNTHESIS UNI: CPT | Mod: TC,LT

## 2025-08-22 PROCEDURE — 77065 DX MAMMO INCL CAD UNI: CPT | Mod: 26,LT,, | Performed by: RADIOLOGY

## 2025-08-22 PROCEDURE — 77061 BREAST TOMOSYNTHESIS UNI: CPT | Mod: 26,LT,, | Performed by: RADIOLOGY
